# Patient Record
Sex: FEMALE | Race: WHITE | NOT HISPANIC OR LATINO | Employment: FULL TIME | URBAN - METROPOLITAN AREA
[De-identification: names, ages, dates, MRNs, and addresses within clinical notes are randomized per-mention and may not be internally consistent; named-entity substitution may affect disease eponyms.]

---

## 2022-02-28 ENCOUNTER — OFFICE VISIT (OUTPATIENT)
Dept: FAMILY MEDICINE | Facility: CLINIC | Age: 37
End: 2022-02-28
Payer: OTHER GOVERNMENT

## 2022-02-28 VITALS
SYSTOLIC BLOOD PRESSURE: 121 MMHG | BODY MASS INDEX: 30.22 KG/M2 | HEART RATE: 78 BPM | HEIGHT: 66 IN | DIASTOLIC BLOOD PRESSURE: 80 MMHG | TEMPERATURE: 98.5 F | WEIGHT: 188 LBS | OXYGEN SATURATION: 98 %

## 2022-02-28 DIAGNOSIS — F41.1 GAD (GENERALIZED ANXIETY DISORDER): Primary | ICD-10-CM

## 2022-02-28 PROCEDURE — 99203 OFFICE O/P NEW LOW 30 MIN: CPT | Performed by: PHYSICIAN ASSISTANT

## 2022-02-28 PROCEDURE — 96127 BRIEF EMOTIONAL/BEHAV ASSMT: CPT | Performed by: PHYSICIAN ASSISTANT

## 2022-02-28 RX ORDER — CITALOPRAM HYDROBROMIDE 20 MG/1
TABLET ORAL
Qty: 30 TABLET | Refills: 1 | Status: SHIPPED | OUTPATIENT
Start: 2022-02-28 | End: 2022-04-04 | Stop reason: DRUGHIGH

## 2022-02-28 ASSESSMENT — ANXIETY QUESTIONNAIRES
1. FEELING NERVOUS, ANXIOUS, OR ON EDGE: NEARLY EVERY DAY
3. WORRYING TOO MUCH ABOUT DIFFERENT THINGS: SEVERAL DAYS
2. NOT BEING ABLE TO STOP OR CONTROL WORRYING: SEVERAL DAYS
6. BECOMING EASILY ANNOYED OR IRRITABLE: NEARLY EVERY DAY
GAD7 TOTAL SCORE: 10
5. BEING SO RESTLESS THAT IT IS HARD TO SIT STILL: NOT AT ALL
IF YOU CHECKED OFF ANY PROBLEMS ON THIS QUESTIONNAIRE, HOW DIFFICULT HAVE THESE PROBLEMS MADE IT FOR YOU TO DO YOUR WORK, TAKE CARE OF THINGS AT HOME, OR GET ALONG WITH OTHER PEOPLE: SOMEWHAT DIFFICULT
7. FEELING AFRAID AS IF SOMETHING AWFUL MIGHT HAPPEN: MORE THAN HALF THE DAYS

## 2022-02-28 ASSESSMENT — PAIN SCALES - GENERAL: PAINLEVEL: NO PAIN (0)

## 2022-02-28 ASSESSMENT — PATIENT HEALTH QUESTIONNAIRE - PHQ9
SUM OF ALL RESPONSES TO PHQ QUESTIONS 1-9: 9
5. POOR APPETITE OR OVEREATING: NOT AT ALL

## 2022-02-28 NOTE — NURSING NOTE
"Chief Complaint   Patient presents with     Anxiety       Initial /80   Pulse 78   Temp 98.5  F (36.9  C) (Tympanic)   Ht 1.664 m (5' 5.5\")   Wt 85.3 kg (188 lb)   SpO2 98%   BMI 30.81 kg/m   Estimated body mass index is 30.81 kg/m  as calculated from the following:    Height as of this encounter: 1.664 m (5' 5.5\").    Weight as of this encounter: 85.3 kg (188 lb).  Medication Reconciliation: complete    JAN Darby MA    "

## 2022-02-28 NOTE — PROGRESS NOTES
"  Assessment & Plan     AMELIA (generalized anxiety disorder)  She is going to start counseling. Referral placed and she will schedule the appointment.   Also start medication, selective serotonin reuptake inhibitor medications discussed.   She will start celexa. Side effects and how to take the medication discussed.  Plan to return in 4-6 weeks for follow up, sooner if needed.   - citalopram (CELEXA) 20 MG tablet; 1/2 tablet daily x 6 days, then increase to 1 tablet daily  - Adult Mental Health  Referral; Future      30 minutes spent on the date of the encounter doing chart review, review of outside records, patient visit and documentation        BMI:   Estimated body mass index is 30.81 kg/m  as calculated from the following:    Height as of this encounter: 1.664 m (5' 5.5\").    Weight as of this encounter: 85.3 kg (188 lb).   Weight management plan:  not discussed today        Return in about 1 month (around 3/28/2022) for depression / anxiety.    Kristen M. Kehr, PA-C  [facility]   Albertina is a 36 year old who presents for the following health issues     Albertina is new to [facility]. She was previously living in [facility] and recently moved to [place]. She is here today to discuss anxiety. She feels that she has had anxiety for years. She has been able to stay busy with her schedule and will typically have good and bad days. She often has thought about making an appointment on the bad days, but then the good days seem to outweigh and she does not schedule.  She has had more stressors in her life recently. She is working on custody agreement for her 3 year old child. She has shared custody with his father and the father is still in [facility]. This is 3 hours away. She also has work stress with starting a new job. Her friend tried to commit suicide a few weeks ago and her son is currently being treated for anxiety also.   She is having difficulty with " "concentration. She has a hard time shutting her mind and worries off. She scheduled to see a counselor today, but the appointment was cancelled because she was told she needed a referral in order to keep the appointment.   She is here today to discuss referral and medication treatments.     History of Present Illness       Mental Health Follow-up:  Patient presents to follow-up on Anxiety.    Patient's anxiety since last visit has been:  Worse  The patient is not having other symptoms associated with anxiety.  Any significant life events: No  Patient is feeling anxious or having panic attacks.  Patient has no concerns about alcohol or drug use.     Social History  Tobacco Use    Smoking status: Never Smoker    Smokeless tobacco: Never Used  Alcohol use: Yes    Comment: Alcoholic Drinks/day: Rarely  Drug use: No      Today's PHQ-9         PHQ-9 Total Score:         PHQ-9 Q9 Thoughts of better off dead/self-harm past 2 weeks :       Thoughts of suicide or self harm:      Self-harm Plan:        Self-harm Action:          Safety concerns for self or others:           She eats 0-1 servings of fruits and vegetables daily.She consumes 0 sweetened beverage(s) daily.She exercises with enough effort to increase her heart rate 9 or less minutes per day.  She exercises with enough effort to increase her heart rate 3 or less days per week.   She is taking medications regularly.             Review of Systems   Constitutional, HEENT, cardiovascular, pulmonary, GI, , musculoskeletal, neuro, skin, endocrine and psych systems are negative, except as otherwise noted.      Objective    /80   Pulse 78   Temp 98.5  F (36.9  C) (Tympanic)   Ht 1.664 m (5' 5.5\")   Wt 85.3 kg (188 lb)   SpO2 98%   BMI 30.81 kg/m    Body mass index is 30.81 kg/m .  Physical Exam   GENERAL: healthy, alert and no distress  PSYCH: mentation appears normal, tearful, anxious, judgement and insight intact and appearance well groomed                "

## 2022-03-01 ENCOUNTER — TELEPHONE (OUTPATIENT)
Dept: BEHAVIORAL HEALTH | Facility: CLINIC | Age: 37
End: 2022-03-01
Payer: OTHER GOVERNMENT

## 2022-03-01 ASSESSMENT — ANXIETY QUESTIONNAIRES: GAD7 TOTAL SCORE: 10

## 2022-03-02 NOTE — TELEPHONE ENCOUNTER
First attempt to contact pt. Writer left  with TC contact info and encouraged pt to call back and schedule initital TC therapy appointment.    Jessica Lay  03/01/22  633  ----- Message from Jalil Euceda sent at 3/1/2022  4:26 PM CST -----  Regarding: transition clinic referral  Transition Clinic Referral   [place] Only   Limited [place] Availability    Type of Referral:      ___X_Therapy    _____Therapy & Medication (Psychiatry next level of care appointment needs to be scheduled)  _____Medication Only (Psychiatry next level of care appointment needs to be scheduled)  _____Diagnostic Assessment Only      Referring Provider Name: Jalil Euceda    Clinician completing the assessment.     Referring Provider: OTHER: Outpatient Intake    If known, referring provider Phone Number: [phone]    Reason for Transition Clinic Referral: Pt has a referral for long-term therapy. Please help bridge the wait until pt can be seen.    [facility]     Start Date for Next Level of Care Therapy (Required): 7/25/22  [facility]     Start Date for Next Level of Care Medication (Required): N/a  Provider  Location   Psychiatry cannot see patients who do not have active medical insurance    What Would Be Helpful from the Transition Clinic: Pt has a referral for long-term therapy. Please help bridge the wait until pt can be seen.     Needs: NO    Does Patient Have Access to Technology: yes    Patient E-mail Address: [email or web address]    Current Patient Phone Number: [phone];     Clinician Gender Preference (if applicable): NO    Jalil Euceda

## 2022-03-02 NOTE — TELEPHONE ENCOUNTER
Pt called back on que. Writer scheduled pt with TC therapy for 03/07 at 2 pm with Jaja. Pt said she wanted to wait until therapy appointment to see if she wanted to schedule different long term therapy. [facility] referral as complete.    Jessica Lay  03/01/22  650    ----- Message from Jalil Euceda sent at 3/1/2022  4:26 PM CST -----  Regarding: transition clinic referral  Transition Clinic Referral   [place] Only   Limited [place] Availability     Type of Referral:        ___X_Therapy    _____Therapy & Medication (Psychiatry next level of care appointment needs to be scheduled)  _____Medication Only (Psychiatry next level of care appointment needs to be scheduled)  _____Diagnostic Assessment Only        Referring Provider Name: Jalil Euceda     Clinician completing the assessment.      Referring Provider: OTHER: Outpatient Intake     If known, referring provider Phone Number: [phone]     Reason for Transition Clinic Referral: Pt has a referral for long-term therapy. Please help bridge the wait until pt can be seen.     Next Level of Care Patient Will Be Transitioned To: Serena Christensen LGSW  [facility]      Start Date for Next Level of Care Therapy (Required): 7/25/22  [facility]      Start Date for Next Level of Care Medication (Required): N/a  Provider  Location   Psychiatry cannot see patients who do not have active medical insurance     What Would Be Helpful from the Transition Clinic: Pt has a referral for long-term therapy. Please help bridge the wait until pt can be seen.      Needs: NO     Does Patient Have Access to Technology: yes     Patient E-mail Address: [email or web address]     Current Patient Phone Number: [phone];      Clinician Gender Preference (if applicable): NO     Jalil Euceda

## 2022-03-07 ENCOUNTER — TELEPHONE (OUTPATIENT)
Dept: BEHAVIORAL HEALTH | Facility: CLINIC | Age: 37
End: 2022-03-07
Payer: OTHER GOVERNMENT

## 2022-03-07 NOTE — TELEPHONE ENCOUNTER
Patient called to reschedule TC therapy appointment. Rescheduled to 3/11/2022.     Danika Crook  Transition Clinic Coordinator

## 2022-03-11 ENCOUNTER — VIRTUAL VISIT (OUTPATIENT)
Dept: BEHAVIORAL HEALTH | Facility: CLINIC | Age: 37
End: 2022-03-11
Payer: OTHER GOVERNMENT

## 2022-03-11 DIAGNOSIS — F41.1 GAD (GENERALIZED ANXIETY DISORDER): Primary | ICD-10-CM

## 2022-03-11 DIAGNOSIS — F43.23 ADJUSTMENT DISORDER WITH MIXED ANXIETY AND DEPRESSED MOOD: ICD-10-CM

## 2022-03-16 ENCOUNTER — VIRTUAL VISIT (OUTPATIENT)
Dept: BEHAVIORAL HEALTH | Facility: CLINIC | Age: 37
End: 2022-03-16
Payer: OTHER GOVERNMENT

## 2022-03-16 DIAGNOSIS — F43.23 ADJUSTMENT DISORDER WITH MIXED ANXIETY AND DEPRESSED MOOD: ICD-10-CM

## 2022-03-16 DIAGNOSIS — F41.1 GAD (GENERALIZED ANXIETY DISORDER): Primary | ICD-10-CM

## 2022-03-16 NOTE — PROGRESS NOTES
[facility]   Mental Health & Addiction Services     Progress Note - Initial Visit    Patient  Name:  Albertina Rojas  Date: 3/11/2022         Service Type: Individual     Visit Start Time: 1400  Visit End Time: 1508    Visit #: 1    Attendees: Client attended alone    Service Modality:  Video Visit:      Provider verified identity through the following two step process.  Patient provided:  Patient  and Patient address    Telemedicine Visit: The patient's condition can be safely assessed and treated via synchronous audio and visual telemedicine encounter.      Reason for Telemedicine Visit: Services only offered telehealth    Originating Site (Patient Location): Patient's other pt was in her car during session    Distant Site (Provider Location): [facility]DI[facility]    Consent:  The patient/guardian has verbally consented to: the potential risks and benefits of telemedicine (video visit) versus in person care; bill my insurance or make self-payment for services provided; and responsibility for payment of non-covered services.     Patient would like the video invitation sent by:  My Chart    Mode of Communication:  Video Conference via Amwell    As the provider I attest to compliance with applicable laws and regulations related to telemedicine.       DATA:   Interactive Complexity: No   Crisis: No     Presenting Concerns/  Current Stressors:   Pt referred to the Transition Clinic by her primary care physician at [facility]. Per chart review, Albertina is new to [facility]. She was previously living in [facility] and recently moved to [place]. She is here today to discuss anxiety. She feels that she has had anxiety for years. She has been able to stay busy with her schedule and will typically have good and bad days. She often has thought about making an appointment on the bad days, but then the good days seem to outweigh and she  "does not schedule.  She has had more stressors in her life recently. She is working on custody agreement for her 3 year old child. She has shared custody with his father and the father is still in [facility]. This is 3 hours away. She also has work stress with starting a new job. Her friend tried to commit suicide a few weeks ago and her son is currently being treated for anxiety also.   She is having difficulty with concentration. She has a hard time shutting her mind and worries off. She scheduled to see a counselor today, but the appointment was cancelled because she was told she needed a referral in order to keep the appointment. She is here today to discuss referral and medication treatments\"    Patient was referred to the [facility] for long-term therapy but wait time is June. Patient expressed interest in this provider assisting her in finding a long-term therapist outside of the [place] system that accepts [facility]. Patient was emotional during the session. The patient reports her main stressor involves the ongoing custody issues with her youngest sons father. The patient reports that she has two children, both boys, ages 13 and 3. The patient states that when she has been  x2 and states that during her second marriage, she was pregnant with her son that is now 3 when her ex- announced that he wanted a divorce. The patient reports that she has always felt a level of anxiety but has been fairly successful in managing these symptoms. Symptoms started increasing in the past two months when her ex- began challenging their custody agreement. The patient states that currently, \"the smallest things will make me cry and then I get angry that I can't control my emotions.\" The patient is in [employer] and is actively serving. Pt reports high stress at work. Recently, the patients direct supervisor was discovered in his car, outside of his job, with a gun, intent on shooting himself. The patient " reports this was highly traumatic for her and she still feels a lot of anger, sadness, and resentment about it.     Pt expressed comprehension and acceptance of informed consent and the nature of / limitations to confidentiality due to mandated reporting when reviewed in session.       ASSESSMENT:  Mental Status Assessment:  Appearance:   Appropriate   Eye Contact:   Fair   Psychomotor Behavior: Restless   Attitude:   Cooperative   Orientation:   All  Speech   Rate / Production: Emotional   Volume:  Normal   Mood:    Anxious  Depressed  Sad  Panicked  Affect:    Appropriate  Tearful Worrisome   Thought Content:  Clear   Thought Form:  Coherent  Logical   Insight:    Good       Safety Issues and Plan for Safety and Risk Management:   Logan Suicide Severity Rating Scale (Lifetime/Recent)No flowsheet data found.  Patient denies current fears or concerns for personal safety.  Patient denies current or recent suicidal ideation or behaviors.  Patient denies current or recent homicidal ideation or behaviors.  Patient denies current or recent self injurious behavior or ideation.  Patient denies other safety concerns.  Recommended that patient call 911 or go to the local ED should there be a change in any of these risk factors.  Patient reports there are firearms in the house. The firearms are secured in a locked space.     Diagnostic Criteria:  Generalized Anxiety Disorder  B. The person finds it difficult to control the worry.   - Restlessness or feeling keyed up or on edge.    - Being easily fatigued.    - Difficulty concentrating or mind going blank.    - Irritability.    - Muscle tension.    - Sleep disturbance (difficulty falling or staying asleep, or restless unsatisfying sleep).   E. The anxiety, worry, or physical symptoms cause clinically significant distress or impairment in social, occupational, or other important areas of functioning.   F. The disturbance is not due to the direct physiological effects of a  substance (e.g., a drug of abuse, a medication) or a general medical condition (e.g., hyperthyroidism) and does not occur exclusively during a Mood Disorder, a Psychotic Disorder, or a Pervasive Developmental Disorder.  Adjustment Disorder  A. The development of emotional or behavioral symptoms in response to an identifiable stressor(s) occurring within 3 months of the onset of the stressor(s)  B. These symptoms or behaviors are clinically significant, as evidenced by one or both of the following:       - Marked distress that is out of proportion to the severity/intensity of the stressor (with consideration for external context & culture)  C. The stress-related disturbance does not meet criteria for another disorder & is not not an exacerbation of another mental disorder  D. The symptoms do not represent normal bereavement  E. Once the stressor or its consequences have terminated, the symptoms do not persist for more than an additional 6 months       * Adjustment Disorder with Mixed Anxiety and Depressed Mood: The predominant manifestation is a combination of depression and anxiety      DSM5 Diagnoses: (Sustained by DSM5 Criteria Listed Above)  Diagnoses: 300.02 (F41.1) Generalized Anxiety Disorder  Adjustment Disorders  309.28 (F43.23) With mixed anxiety and depressed mood  Psychosocial & Contextual Factors: conflict with ex- over custody of child; active ; financial stressors  WHODAS 2.0 (12 item):   WHODAS 2.0 Total Score 2/24/2022   Total Score 20   Total Score MyChart 20     Intervention:   CBT- Patient was given cognitive distortions list to review and process at next session, Mindfulness- Patient was educated on relaxation and mindfulness techniques, Completed through review of safety issues and safety interventions and Educated on treatment planning and started identifying goals and interventions for treatment plan  Collateral Reports Completed:  Not Applicable      PLAN: (Homework,  other):  1. Provider will continue Diagnostic Assessment.  Patient was given the following to do until next session:  Review CBT handouts; practice DBT techniques    2. Provider recommended the following referrals: pt needs assistance in finding a long-term provider that accepts [facility].      3.  Suicide Risk and Safety Concerns were assessed for Albertina Rojas.    Patient meets the following risk assessment and triage: Patient has no change in safety concerns. Committed to safety and agreed to follow previously developed safety plan.      Dara Mendes, [facility]  March 11, 2022

## 2022-03-17 NOTE — PROGRESS NOTES
[facility] Transition Clinic                                    Progress Note    Patient Name: Albertina Rojas  Date: 3/16/2022         Service Type: Individual      Session Start Time: 1400  Session End Time: 1502      Session Length: 62 Minutes    Session #: 2    Attendees: Client attended alone    Service Modality:  Video Visit:      Provider verified identity through the following two step process.  Patient provided:  Patient is known previously to provider    Telemedicine Visit: The patient's condition can be safely assessed and treated via synchronous audio and visual telemedicine encounter.      Reason for Telemedicine Visit: Services only offered telehealth    Originating Site (Patient Location): Patient's other pt attended session in her car    Distant Site (Provider Location): [facility] MENTAL HEALTH & ADDICTION SERVICES    Consent:  The patient/guardian has verbally consented to: the potential risks and benefits of telemedicine (video visit) versus in person care; bill my insurance or make self-payment for services provided; and responsibility for payment of non-covered services.     Patient would like the video invitation sent by:  My Chart    Mode of Communication:  Video Conference via Amwell    As the provider I attest to compliance with applicable laws and regulations related to telemedicine.    DATA  Interactive Complexity: No  Crisis: No        Progress Since Last Session (Related to Symptoms / Goals / Homework):   Symptoms: Improving pt reports improvement in overall mood since previous session.Pt states she continues to have difficulties with sleep and has felt more fatigued     Homework: Partially completed      Episode of Care Goals: Minimal progress - CONTEMPLATION (Considering change and yet undecided); Intervened by assessing the negative and positive thinking (ambivalence) about behavior change     Current / Ongoing Stressors and Concerns:    Pt referred to  "the Transition Clinic by her primary care physician at [facility]. Per chart review, Albertina is new to [facility]. She was previously living in [facility] and recently moved to [place]. She is here today to discuss anxiety. She feels that she has had anxiety for years. She has been able to stay busy with her schedule and will typically have good and bad days. She often has thought about making an appointment on the bad days, but then the good days seem to outweigh and she does not schedule.  She has had more stressors in her life recently. She is working on custody agreement for her 3 year old child. She has shared custody with his father and the father is still in [place]. This is 3 hours away. She also has work stress with starting a new job. Her friend tried to commit suicide a few weeks ago and her son is currently being treated for anxiety also.   She is having difficulty with concentration. She has a hard time shutting her mind and worries off. She scheduled to see a counselor today, but the appointment was cancelled because she was told she needed a referral in order to keep the appointment. She is here today to discuss referral and medication treatments\"     Patient was referred to the [facility] for long-term therapy but wait time is June. Patient expressed interest in this provider assisting her in finding a long-term therapist outside of the [place] system that accepts [facility].     Today, 3/16/2022, the patient reports she continues to feel overwhelmed with work, stress of her ongoing custody/parenting agreement, and trying to maintain work/life balance. Pt states she gave herself permission to \"sit on the couch and do nothing\" and reports feeling more motivated the following day to cook, clean, and mood was markedly improved. Pt states the custody issues with her ex- continue to be drawn out unnecessarily and she is beginning to worry about finances.     Pt expressed " comprehension and acceptance of informed consent and the nature of / limitations to confidentiality due to mandated reporting when reviewed in session.     Treatment Objective(s) Addressed in This Session:   participate in  activities to improve mood  use distraction each time intrusive worry surfaces  Use thought stopping strategies when anxious symptoms begin to increase     Intervention:    CBT- Patient was given cognitive distortions list to review and process at next session, Mindfulness- Patient was educated on relaxation and mindfulness techniques, Completed through review of safety issues and safety interventions and Educated on treatment planning and started identifying goals and interventions for treatment plan    Assessments completed prior to visit:  The following assessments were completed by patient for this visit:  PHQ9:   PHQ-9 SCORE 2/28/2022   PHQ-9 Total Score 9     GAD7:   AMELIA-7 SCORE 2/24/2022 2/28/2022   Total Score 17 (severe anxiety) -   Total Score 17 10         ASSESSMENT: Current Emotional / Mental Status (status of significant symptoms):   Risk status (Self / Other harm or suicidal ideation)   Patient denies current fears or concerns for personal safety.   Patient denies current or recent suicidal ideation or behaviors.   Patient denies current or recent homicidal ideation or behaviors.   Patient denies current or recent self injurious behavior or ideation.   Patient denies other safety concerns.   Patient reports there has been no change in risk factors since their last session.     Patient reports there has been no change in protective factors since their last session.     Recommended that patient call 911 or go to the local ED should there be a change in any of these risk factors.     Appearance:   Appropriate    Eye Contact:   Good    Psychomotor Behavior: Normal    Attitude:   Cooperative    Orientation:   All   Speech    Rate / Production: Normal     Volume:  Normal  "   Mood:    Anxious    Affect:    Tearful   Thought Content:  Clear    Thought Form:  Coherent  Logical    Insight:    Good      Medication Review:   No changes to current psychiatric medication(s)     Medication Compliance:   Yes     Changes in Health Issues:   None reported     Chemical Use Review:   Substance Use: Chemical use reviewed, no active concerns identified      Tobacco Use: No current tobacco use.      Diagnosis:  1. AMELIA (generalized anxiety disorder) F41.1   2. Adjustment disorder with mixed anxiety and depressed mood F43.23       Collateral Reports Completed:   Not Applicable    PLAN: (Patient Tasks / Therapist Tasks / Other)  Pt will watch the documentary \"The Call to Courage\"  Pt will continue to practice CBT and DBT skills  Pt will prioritize herself and will practice self-care        Dara Mendes, [facility], March 16, 2022                                                         ______________________________________________________________________      "

## 2022-03-23 ENCOUNTER — VIRTUAL VISIT (OUTPATIENT)
Dept: BEHAVIORAL HEALTH | Facility: CLINIC | Age: 37
End: 2022-03-23
Payer: OTHER GOVERNMENT

## 2022-03-23 DIAGNOSIS — F41.1 GAD (GENERALIZED ANXIETY DISORDER): Primary | ICD-10-CM

## 2022-03-23 DIAGNOSIS — F43.23 ADJUSTMENT DISORDER WITH MIXED ANXIETY AND DEPRESSED MOOD: ICD-10-CM

## 2022-03-23 NOTE — PROGRESS NOTES
"    [facility]  Provider Name:  Dara Mendes     Credentials:  [facility]    PATIENT'S NAME: Albertina Rojas  PREFERRED NAME: Albertina  PRONOUNS:    She/Her  MRN: [id number]  : 1985  ADDRESS: [address] [place] [id number]  ACCT. NUMBER:  [id number]  DATE OF SERVICE: 3/23/22  START TIME:  1400  END TIME: 1440  PREFERRED PHONE: [phone]  May we leave a program related message: Yes  SERVICE MODALITY:  Video Visit:      Provider verified identity through the following two step process.  Patient provided:  Patient is known previously to provider    Telemedicine Visit: The patient's condition can be safely assessed and treated via synchronous audio and visual telemedicine encounter.      Reason for Telemedicine Visit: Services only offered telehealth    Originating Site (Patient Location): Patient's place of employment    Distant Site (Provider Location): [facility] MENTAL HEALTH & ADDICTION SERVICES    Consent:  The patient/guardian has verbally consented to: the potential risks and benefits of telemedicine (video visit) versus in person care; bill my insurance or make self-payment for services provided; and responsibility for payment of non-covered services.     Patient would like the video invitation sent by:  My Chart    Mode of Communication:  Video Conference via [employer]    As the provider I attest to compliance with applicable laws and regulations related to telemedicine.    [place] ADULT Mental Health DIAGNOSTIC ASSESSMENT    Identifying Information:  Patient is a 36 year old,  female.The pronoun use throughout this assessment reflects the patient's chosen pronoun. Patient was referred for an assessment by selfself.  Patient attended the session alone.    Chief Complaint:   The reason for seeking services at this time is: \"Possible anxiety\". Per chart review, Albertina is new to [facility]. She " "was previously living in [facility] and recently moved to [place]. She is here today to discuss anxiety. She feels that she has had anxiety for years. She has been able to stay busy with her schedule and will typically have good and bad days. She often has thought about making an appointment on the bad days, but then the good days seem to outweigh and she does not schedule.She has had more stressors in her life recently. She is working on custody agreement for her 3 year old child. She has shared custody with his father and the father is still in [place]. This is 3 hours away. She also has work stress with starting a new job. Her friend tried to commit suicide a few weeks ago and her son is currently being treated for anxiety also. She is having difficulty with concentration. She has a hard time shutting her mind and worries off. She scheduled to see a counselor today, but the appointment was cancelled because she was told she needed a referral in order to keep the appointment. She is here today to discuss referral and medication treatments\"The problem(s) began 10/01/17.    Patient has attempted to resolve these concerns in the past through therapy and medication management.    Social/Family History:  Patient reported they grew up in [place], [place].  They were raised by biological parents  .  Parents one or both remarried.  Patient reported that their childhood was \"good\". Parents were  when patient was in the 6th grade. Went back and forth between parents home. Has one older brother. Patient described their current relationships with family of origin as \"close with mom and dad but I don't speak with my brother much\".     The patient describes their cultural background as . Cultural influences and impact on patient's life structure, values, norms, and healthcare: N/a.  Contextual influences on patient's health include: Individual Factors including ongoing work stressors, Family Factors " including ongoing custody issue with the father of her youngest son and Economic Factors including ongoing legal fees for custody case. These factors will be addressed in the Preliminary Treatment plan. Patient identified their preferred language to be English. Patient reported they does not need the assistance of an  or other support involved in therapy.     Patient reported had no significant delays in developmental tasks. Patient's highest education level was associate degree / vocational certificate  Patient identified the following learning problems: none reported.  Modifications will not be used to assist communication in therapy.  Patient reports they are able to understand written materials.    Patient reported the following relationship history: patient has been twice and both marriages ended in divorce.  Patient's current relationship status is has a partner or significant other.   Patient identified their sexual orientation as heterosexual. Patient reported having 2 child(buffy). Patient identified partner; friends as part of their support system. Patient identified the quality of these relationships as stable and meaningful.     Patient's current living/housing situation involves staying in own home/apartment.  The immediate members of family and household include Owen Rizzo, 3 y.o. son, 13 y.o. son and the patients partner and they report that housing is stable.    Patient is currently employed fulltime.  Patient is currently active in the [employer] and is employed full time by the [employer]. Pt reports her job is high stress. Patient reports their finances are obtained through employment. Patient does not identify finances as a current stressor.      Patient reported that they have been involved with the legal system.  Currently working with a  to adjust parenting agreement with Owen (3yrs) . Patient does not report being under probation/ parole/ jurisdiction. They  are not under any current court jurisdiction. .     Patient's Strengths and Limitations:  Patient identified the following strengths or resources that will help them succeed in treatment: commitment to health and well being, friends / good social support, family support, insight, intelligence, positive work environment, motivation and work ethic.Things that may interfere with the patient's success in treatment include: lack of support from her sons father; ongoing work stressors.     Assessments:  The following assessments were completed by patient for this visit:  PHQ9:   PHQ-9 SCORE 2/28/2022   PHQ-9 Total Score 9     GAD7:   AMELIA-7 SCORE 2/24/2022 2/28/2022   Total Score 17 (severe anxiety) -   Total Score 17 10     CAGE-AID:   CAGE-AID Total Score 2/24/2022   Total Score 0   Total Score MyChart 0 (A total score of 2 or greater is considered clinically significant)     PROMIS 10-Global Health (all questions and answers displayed):   PROMIS 10 3/11/2022   In general, would you say your health is: Good   In general, would you say your quality of life is: Good   In general, how would you rate your physical health? Good   In general, how would you rate your mental health, including your mood and your ability to think? Fair   In general, how would you rate your satisfaction with your social activities and relationships? Fair   In general, please rate how well you carry out your usual social activities and roles Fair   To what extent are you able to carry out your everyday physical activities such as walking, climbing stairs, carrying groceries, or moving a chair? Completely   How often have you been bothered by emotional problems such as feeling anxious, depressed or irritable? Often   How would you rate your fatigue on average? Severe   How would you rate your pain on average?   0 = No Pain  to  10 = Worst Imaginable Pain 0   In general, would you say your health is: 3   In general, would you say your quality of life  is: 3   In general, how would you rate your physical health? 3   In general, how would you rate your mental health, including your mood and your ability to think? 2   In general, how would you rate your satisfaction with your social activities and relationships? 2   In general, please rate how well you carry out your usual social activities and roles. (This includes activities at home, at work and in your community, and responsibilities as a parent, child, spouse, employee, friend, etc.) 2   To what extent are you able to carry out your everyday physical activities such as walking, climbing stairs, carrying groceries, or moving a chair? 5   In the past 7 days, how often have you been bothered by emotional problems such as feeling anxious, depressed, or irritable? 4   In the past 7 days, how would you rate your fatigue on average? 4   In the past 7 days, how would you rate your pain on average, where 0 means no pain, and 10 means worst imaginable pain? 0   Global Mental Health Score 9   Global Physical Health Score 15   PROMIS TOTAL - SUBSCORES 24   Some recent data might be hidden       Personal and Family Medical History:  Patient does not report a family history of mental health concerns.  Patient reports family history includes Anxiety Disorder in her son; Cancer (age of onset: 50.00) in her mother; Hypertension in her father; No Known Problems in her brother..     Patient does report Mental Health Diagnosis and/or Treatment.  Patient Patient reported the following previous diagnoses which include(s): an Anxiety Disorder. Patient reported symptoms began in October 2017.  Patient has received mental health services in the past: therapy with unknown provider and medication management by her PCP.  Psychiatric Hospitalizations: None.  Patient denies a history of civil commitment. Patient is receiving other mental health services.These include psychotherapy with [facility]. .         Patient  has had a physical exam to rule out medical causes for current symptoms.  Date of last physical exam was within the past year. Client was encouraged to follow up with PCP if symptoms were to develop. The patient has a [facility] Primary Care Provider, who is named Kehr, Kristen M..  Patient reports no current medical and/or dental concerns. Patient denies any issues with pain. There are not significant appetite / nutritional concerns / weight changes.   Patient does not report a history of head injury / trauma / cognitive impairment.     Patient reports current meds as:   Celexa 20mg tablet daily       Medication Adherence:  Patient reports   taking prescribed medications as prescribed.    Patient Allergies:  No Known Allergies    Medical History:  No past medical history on file.      Current Mental Status Exam:   Appearance:  Appropriate    Eye Contact:  Good   Psychomotor:  Normal       Gait / station:  no problem  Attitude / Demeanor: Cooperative  Friendly Pleasant  Speech      Rate / Production: Normal/ Responsive      Volume:  Normal  volume      Language:  intact  Mood:   Normal Bright; good mood  Affect:   Appropriate  Bright    Thought Content: Clear   Thought Process: Coherent  Logical       Associations: No loosening of associations  Insight:   Good   Judgment:  Intact   Orientation:  All  Attention/concentration: Good      Substance Use:  Patient did not report a family history of substance use concerns; see medical history section for details.  Patient has not received chemical dependency treatment in the past.  Patient has not ever been to detox.      Patient is not currently receiving any chemical dependency treatment.           Substance History of use Age of first use Date of last use     Pattern and duration of use (include amounts and frequency)   Alcohol currently use   23, drink socially 02/19/22 REPORTS SUBSTANCE USE: N/A   Cannabis   never used     REPORTS SUBSTANCE USE: N/A     Amphetamines    never used     REPORTS SUBSTANCE USE: N/A   Cocaine/crack    never used       REPORTS SUBSTANCE USE: N/A   Hallucinogens never used         REPORTS SUBSTANCE USE: N/A   Inhalants never used         REPORTS SUBSTANCE USE: N/A   Heroin never used         REPORTS SUBSTANCE USE: N/A   Other Opiates never used     REPORTS SUBSTANCE USE: N/A   Benzodiazepine   never used     REPORTS SUBSTANCE USE: N/A   Barbiturates never used     REPORTS SUBSTANCE USE: N/A   Over the counter meds currently use 20+, use went sick 01/15/22 REPORTS SUBSTANCE USE: N/A   Caffeine currently use 17, soda/coffee   REPORTS SUBSTANCE USE: N/A   Nicotine  never used     REPORTS SUBSTANCE USE: N/A   Other substances not listed above:  Identify:  never used     REPORTS SUBSTANCE USE: N/A     Patient reported the following problems as a result of their substance use: no problems, not applicable.    Substance Use: No symptoms    Based on the negative CAGE score and clinical interview there  are not indications of drug or alcohol abuse.      Significant Losses / Trauma / Abuse / Neglect Issues:   Patient did serve in the . 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  Concerns for possible neglect are not present.    Safety Assessment:   Patient denies current homicidal ideation and behaviors.  Patient denies current self-injurious ideation and behaviors.    Patient denied risk behaviors associated with substance use.  Patient denies any high risk behaviors associated with mental health symptoms.  Patient reports the following current concerns for their personal safety: None.  Patient reports there are firearms in the house. The firearms are secured in  a locked space.    History of Safety Concerns:  Patient denied a history of homicidal ideation.     Patient denied a history of personal safety concerns.    Patient denied a history of assaultive behaviors.    Patient denied a history of sexual assault behaviors.     Patient denied a history of risk behaviors associated with substance use.  Patient denies any history of high risk behaviors associated with mental health symptoms.  Patient reports the following protective factors: forward or future oriented thinking; dedication to family or friends    Risk Plan:  See Recommendations for Safety and Risk Management Plan    Review of Symptoms per patient report:  Depression: Change in sleep, Excessive or inappropriate guilt, Change in energy level, Difficulties concentrating, Change in appetite, Feelings of hopelessness, Feelings of helplessness, Low self-worth, Irritability, Feeling sad, down, or depressed, Withdrawn and Frequent crying  Caitlin:  No Symptoms  Psychosis: No Symptoms  Anxiety: Excessive worry, Nervousness, Physical complaints, such as headaches, stomachaches, muscle tension, Sleep disturbance, Poor concentration and Irritability  Panic:  Palpitations  Post Traumatic Stress Disorder:  No Symptoms   Eating Disorder: No Symptoms  ADD / ADHD:  No symptoms  Conduct Disorder: No symptoms  Autism Spectrum Disorder: No symptoms  Obsessive Compulsive Disorder: No Symptoms    Patient reports the following compulsive behaviors and treatment history: None reported.      Diagnostic Criteria:   Adjustment Disorder  A. The development of emotional or behavioral symptoms in response to an identifiable stressor(s) occurring within 3 months of the onset of the stressor(s)  B. These symptoms or behaviors are clinically significant, as evidenced by one or both of the following:  C. The stress-related disturbance does not meet criteria for another disorder & is not not an exacerbation of another mental disorder  D. The  symptoms do not represent normal bereavement  E. Once the stressor or its consequences have terminated, the symptoms do not persist for more than an additional 6 months       * Adjustment Disorder with Mixed Anxiety and Depressed Mood: The predominant manifestation is a combination of depression and anxiety    Functional Status:  Patient reports the following functional impairments:  childcare / parenting, home life with her two children and her current partner, management of the household and or completion of tasks, organization, relationship(s), self-care, social interactions and work / vocational responsibilities.     Nonprogrammatic care:  Patient is requesting basic services to address current mental health concerns.    Clinical Summary:  1. Reason for assessment: seeking individual long-term therapy   2. Psychosocial, Cultural and Contextual Factors: custody concerns with her youngest son; active in the ; hx of divorce.  3. Principal DSM5 Diagnoses  (Sustained by DSM5 Criteria Listed Above):  Adjustment Disorders  309.28 (F43.23) With mixed anxiety and depressed mood.  4. Other Diagnoses that is relevant to services:   300.02 (F41.1) Generalized Anxiety Disorder.  5. Provisional Diagnosis:  Adjustment Disorders  309.28 (F43.23) With mixed anxiety and depressed mood as evidenced by ongoing anxiety and depressed mood due to recent life circumstances including custody issues; ongoing work stress and relational conflict .  6. Prognosis: Relieve Acute Symptoms.  7. Likely consequences of symptoms if not treated: higher level of care.  8. Client strengths include:  caring, educated, empathetic, employed, goal-focused, good listener, has a previous history of therapy, insightful, intelligent, motivated, open to learning, open to suggestions / feedback, responsible parent, support of family, friends and providers, supportive, wants to learn, willing to ask questions, willing to relate to others and work  history .     Recommendations:     1. Plan for Safety and Risk Management:   Recommended that patient call 911 or go to the local ED should there be a change in any of these risk factors. Report to child / adult protection services was NA.     2. Patient's identified none reported.     3. Initial Treatment will focus on:    Anxiety - excessive worry; sleep disturbance; crying excessively irritability  Adjustment Difficulties related to: family concerns and custody concerns; work stressors.     4. Resources/Service Plan:    services are not indicated.   Modifications to assist communication are not indicated.   Additional disability accommodations are not indicated.      5. Collaboration:   Collaboration / coordination of treatment will be initiated with the following  support professionals: none at this time. The provider will assist the patient with findiing a long-term provider that accepts [facility].      6.  Referrals:   The following referral(s) will be initiated: [facility]. Next Scheduled Appointment: no appointment at this time.     A Release of Information has been obtained for the following: none at this time.    7. YAZ:    YAZ:  Discussed the general effects of drugs and alcohol on health and well-being. Provider gave patient printed information about the effects of chemical use on their health and well being. Recommendations: None at this time.    8. Records:   These were reviewed at time of assessment.   Information in this assessment was obtained from the medical record and  provided by patient who is a good historian.  Patient will have open access to their mental health medical record.        Provider Name/ Credentials:  OLAMIDE Ervin  March 23, 2022

## 2022-04-02 ENCOUNTER — HEALTH MAINTENANCE LETTER (OUTPATIENT)
Age: 37
End: 2022-04-02

## 2022-04-03 DIAGNOSIS — F41.1 GAD (GENERALIZED ANXIETY DISORDER): ICD-10-CM

## 2022-04-04 ENCOUNTER — OFFICE VISIT (OUTPATIENT)
Dept: FAMILY MEDICINE | Facility: CLINIC | Age: 37
End: 2022-04-04
Payer: OTHER GOVERNMENT

## 2022-04-04 VITALS
HEART RATE: 79 BPM | OXYGEN SATURATION: 98 % | DIASTOLIC BLOOD PRESSURE: 80 MMHG | WEIGHT: 187 LBS | TEMPERATURE: 97.7 F | SYSTOLIC BLOOD PRESSURE: 121 MMHG | BODY MASS INDEX: 30.65 KG/M2

## 2022-04-04 DIAGNOSIS — F41.1 GAD (GENERALIZED ANXIETY DISORDER): Primary | ICD-10-CM

## 2022-04-04 PROCEDURE — 99213 OFFICE O/P EST LOW 20 MIN: CPT | Performed by: PHYSICIAN ASSISTANT

## 2022-04-04 RX ORDER — CITALOPRAM HYDROBROMIDE 20 MG/1
TABLET ORAL
Qty: 30 TABLET | Refills: 1 | OUTPATIENT
Start: 2022-04-04

## 2022-04-04 RX ORDER — CITALOPRAM HYDROBROMIDE 20 MG/1
20 TABLET ORAL DAILY
Qty: 90 TABLET | Refills: 3 | Status: SHIPPED | OUTPATIENT
Start: 2022-04-04 | End: 2023-03-31

## 2022-04-04 ASSESSMENT — ANXIETY QUESTIONNAIRES
GAD7 TOTAL SCORE: 8
2. NOT BEING ABLE TO STOP OR CONTROL WORRYING: SEVERAL DAYS
7. FEELING AFRAID AS IF SOMETHING AWFUL MIGHT HAPPEN: SEVERAL DAYS
4. TROUBLE RELAXING: MORE THAN HALF THE DAYS
1. FEELING NERVOUS, ANXIOUS, OR ON EDGE: SEVERAL DAYS
3. WORRYING TOO MUCH ABOUT DIFFERENT THINGS: SEVERAL DAYS
GAD7 TOTAL SCORE: 8
7. FEELING AFRAID AS IF SOMETHING AWFUL MIGHT HAPPEN: SEVERAL DAYS
5. BEING SO RESTLESS THAT IT IS HARD TO SIT STILL: NOT AT ALL
GAD7 TOTAL SCORE: 8
6. BECOMING EASILY ANNOYED OR IRRITABLE: MORE THAN HALF THE DAYS

## 2022-04-04 ASSESSMENT — PAIN SCALES - GENERAL: PAINLEVEL: NO PAIN (0)

## 2022-04-04 ASSESSMENT — PATIENT HEALTH QUESTIONNAIRE - PHQ9: SUM OF ALL RESPONSES TO PHQ QUESTIONS 1-9: 0

## 2022-04-04 NOTE — NURSING NOTE
"Chief Complaint   Patient presents with     Depression     Anxiety       Initial /80   Pulse 79   Temp 97.7  F (36.5  C) (Tympanic)   Wt 84.8 kg (187 lb)   SpO2 98%   BMI 30.65 kg/m   Estimated body mass index is 30.65 kg/m  as calculated from the following:    Height as of 2/28/22: 1.664 m (5' 5.5\").    Weight as of this encounter: 84.8 kg (187 lb).  Medication Reconciliation: complete    JAN Darby MA    "

## 2022-04-04 NOTE — PROGRESS NOTES
Assessment & Plan     AMELIA (generalized anxiety disorder)  Doing well on the citalopram.   Continue with the 20 mg dose.   She will contact the clinic if any concerns.   She will return for routine visit and medication check in 1 year.   - citalopram (CELEXA) 20 MG tablet; Take 1 tablet (20 mg) by mouth daily    Review of prior external note(s) from - CareEverywhere [facility] from [facility] reviewed  15 minutes spent on the date of the encounter doing chart review, review of outside records, patient visit and documentation            Return in about 1 year (around 4/4/2023) for medication check.    Kristen M. Kehr, PA-C  [facility]    Patria Eng is a 36 year old who presents for the following health issues     She started the citalopram and is now on 20 mg daily.   She feels that her anxiety is better.     History of Present Illness       Mental Health Follow-up:  Patient presents to follow-up on Anxiety.    Patient's anxiety since last visit has been:  Better  The patient is having other symptoms associated with anxiety.  Any significant life events: No  Patient is not feeling anxious or having panic attacks.  Patient has no concerns about alcohol or drug use.         Today's AMELIA-7 Score: 8              Review of Systems   Constitutional, HEENT, cardiovascular, pulmonary, GI, , musculoskeletal, neuro, skin, endocrine and psych systems are negative, except as otherwise noted.      Objective    /80   Pulse 79   Temp 97.7  F (36.5  C) (Tympanic)   Wt 84.8 kg (187 lb)   SpO2 98%   BMI 30.65 kg/m    Body mass index is 30.65 kg/m .  Physical Exam   GENERAL: healthy, alert and no distress  PSYCH: mentation appears normal, affect normal/bright, judgement and insight intact and appearance well groomed

## 2022-04-05 ASSESSMENT — ANXIETY QUESTIONNAIRES: GAD7 TOTAL SCORE: 8

## 2022-04-06 ENCOUNTER — TELEPHONE (OUTPATIENT)
Dept: BEHAVIORAL HEALTH | Facility: CLINIC | Age: 37
End: 2022-04-06
Payer: OTHER GOVERNMENT

## 2022-04-06 NOTE — TELEPHONE ENCOUNTER
Writer gave pt a call to reschedule todays appointment 04/06/22 as provider called in sick. Pt kept appointment for next week.    Jessica Lay  04/06/22  [id number]

## 2022-04-13 ENCOUNTER — VIRTUAL VISIT (OUTPATIENT)
Dept: BEHAVIORAL HEALTH | Facility: CLINIC | Age: 37
End: 2022-04-13
Payer: OTHER GOVERNMENT

## 2022-04-13 DIAGNOSIS — F41.1 GAD (GENERALIZED ANXIETY DISORDER): Primary | ICD-10-CM

## 2022-04-13 DIAGNOSIS — F43.23 ADJUSTMENT DISORDER WITH MIXED ANXIETY AND DEPRESSED MOOD: ICD-10-CM

## 2022-04-13 NOTE — PROGRESS NOTES
[facility]                                    Progress Note    Patient Name: Albertina Rojas  Date: 4/13/2022         Service Type: Individual      Session Start Time: 1400  Session End Time: 1510     Session Length: 80 Minutes    Session #: 4    Attendees: Client attended alone    Service Modality:  Video Visit:      Provider verified identity through the following two step process.  Patient provided:  Patient is known previously to provider    Telemedicine Visit: The patient's condition can be safely assessed and treated via synchronous audio and visual telemedicine encounter.      Reason for Telemedicine Visit: Services only offered telehealth    Originating Site (Patient Location): Patient's home    Distant Site (Provider Location): [facility] MENTAL HEALTH & ADDI[facility]    Consent:  The patient/guardian has verbally consented to: the potential risks and benefits of telemedicine (video visit) versus in person care; bill my insurance or make self-payment for services provided; and responsibility for payment of non-covered services.     Patient would like the video invitation sent by:  My Chart    Mode of Communication:  Video Conference via Amwell    As the provider I attest to compliance with applicable laws and regulations related to telemedicine.    DATA  Interactive Complexity: No  Crisis: No        Progress Since Last Session (Related to Symptoms / Goals / Homework):   Symptoms: No change no signficant change in symptoms. Baseline anxiety of 3 on a scale to 5 with 5 being the worst. Continues to struggle with communication with partner    Homework: Achieved / completed to satisfaction      Episode of Care Goals: Minimal progress - PREPARATION (Decided to change - considering how); Intervened by negotiating a change plan and determining options / strategies for behavior change, identifying triggers, exploring social supports, and working towards setting  "a date to begin behavior change     Current / Ongoing Stressors and Concerns:   Pt referred to the Transition Clinic by her primary care physician at [facility]. Per chart review, Albertina is new to [facility]. She was previously living in [facility] and recently moved to [place]. She is here today to discuss anxiety. She feels that she has had anxiety for years. She has been able to stay busy with her schedule and will typically have good and bad days. She often has thought about making an appointment on the bad days, but then the good days seem to outweigh and she does not schedule.  She has had more stressors in her life recently. She is working on custody agreement for her 3 year old child. She has shared custody with his father and the father is still in [facility]. This is 3 hours away. She also has work stress with starting a new job. Her friend tried to commit suicide a few weeks ago and her son is currently being treated for anxiety also.   She is having difficulty with concentration. She has a hard time shutting her mind and worries off. She scheduled to see a counselor today, but the appointment was cancelled because she was told she needed a referral in order to keep the appointment. She is here today to discuss referral and medication treatments\"     Patient was referred to the [facility] for long-term therapy and is scheduled for 7/25/22. This provider sent the patient several possible long-term providers that accept her  insurance of [employer]. The patient reports that she reached out to one of the providers and discovered that there would be several steps to take in order for her to be able to start with this provider. The patient reports that even discussing the \"red tape\" of working with this provider caused her anxiety to \"skyrocket\". The patient states that she would like to continue to see this provider until she starts with the new provider at [facility] in July.     Today, 4/13/22, " "the patient reports that her main stressors continue to be communication with her partner about the household chores, her children, and discussing money. The patient states that she and her partner communicate very differently and she often becomes frustrated him for his \"lack of response\". The patient states that she has asked herself many times if she can see herself with this partner for \"life long\" and this is something she thinks about a lot. The patient reports that she and her partner communicate very differently and this is a point of contention for her.     Pt expressed comprehension and acceptance of informed consent and the nature of / limitations to confidentiality due to mandated reporting when reviewed in session.     Treatment Objective(s) Addressed in This Session:   learn & utilize at least assertive communication skills weekly  Learn and practice conflict resolution skills     Intervention:   Emotion Focused Therapy: Cycle De-escalation; empathy based techniques; reprocessing  Motivational Interviewing: Active listening, validation, and reassurance    Assessments completed prior to visit:  The following assessments were completed by patient for this visit:  PHQ2:   PHQ-2 ( 1999 [employer]) 4/4/2022 2/28/2022 2/28/2022   Q1: Little interest or pleasure in doing things 1 1 -   Q2: Feeling down, depressed or hopeless 0 1 -   PHQ-2 Score 1 2 -   Q1: Little interest or pleasure in doing things Several days Several days -   Q2: Feeling down, depressed or hopeless Not at all Several days -   PHQ-2 Score 1 Incomplete 2     PHQ9:   PHQ-9 SCORE 2/28/2022 4/4/2022   PHQ-9 Total Score 9 0     GAD2:   AMELIA-2 2/24/2022 4/13/2022   Feeling nervous, anxious, or on edge 3 1   Not being able to stop or control worrying 3 1   AMELIA-2 Total Score 6 2     PROMIS 10-Global Health (all questions and answers displayed):   PROMIS 10 3/11/2022   In general, would you say your health is: Good   In general, would you say your " quality of life is: Good   In general, how would you rate your physical health? Good   In general, how would you rate your mental health, including your mood and your ability to think? Fair   In general, how would you rate your satisfaction with your social activities and relationships? Fair   In general, please rate how well you carry out your usual social activities and roles Fair   To what extent are you able to carry out your everyday physical activities such as walking, climbing stairs, carrying groceries, or moving a chair? Completely   How often have you been bothered by emotional problems such as feeling anxious, depressed or irritable? Often   How would you rate your fatigue on average? Severe   How would you rate your pain on average?   0 = No Pain  to  10 = Worst Imaginable Pain 0   In general, would you say your health is: 3   In general, would you say your quality of life is: 3   In general, how would you rate your physical health? 3   In general, how would you rate your mental health, including your mood and your ability to think? 2   In general, how would you rate your satisfaction with your social activities and relationships? 2   In general, please rate how well you carry out your usual social activities and roles. (This includes activities at home, at work and in your community, and responsibilities as a parent, child, spouse, employee, friend, etc.) 2   To what extent are you able to carry out your everyday physical activities such as walking, climbing stairs, carrying groceries, or moving a chair? 5   In the past 7 days, how often have you been bothered by emotional problems such as feeling anxious, depressed, or irritable? 4   In the past 7 days, how would you rate your fatigue on average? 4   In the past 7 days, how would you rate your pain on average, where 0 means no pain, and 10 means worst imaginable pain? 0   Global Mental Health Score 9   Global Physical Health Score 15   PROMIS TOTAL -  SUBSCORES 24   Some recent data might be hidden         ASSESSMENT: Current Emotional / Mental Status (status of significant symptoms):   Risk status (Self / Other harm or suicidal ideation)   Patient denies current fears or concerns for personal safety.   Patient denies current or recent suicidal ideation or behaviors.   Patient denies current or recent homicidal ideation or behaviors.   Patient denies current or recent self injurious behavior or ideation.   Patient denies other safety concerns.   Patient reports there has been no change in risk factors since their last session.     Patient reports there has been no change in protective factors since their last session.     Recommended that patient call 911 or go to the local ED should there be a change in any of these risk factors.     Appearance:   Appropriate    Eye Contact:   Good    Psychomotor Behavior: Normal    Attitude:   Cooperative    Orientation:   All   Speech    Rate / Production: Emotional Normal     Volume:  Normal    Mood:    Normal Sad    Affect:    Tearful   Thought Content:  Clear    Thought Form:  Coherent  Logical    Insight:    Good      Medication Review:   No changes to current psychiatric medication(s)     Medication Compliance:   Yes     Changes in Health Issues:   None reported     Chemical Use Review:   Substance Use: Chemical use reviewed, no active concerns identified      Tobacco Use: No current tobacco use.      Diagnosis:  1. AMELIA (generalized anxiety disorder) F41.1   2. Adjustment disorder with mixed anxiety and depressed mood F43.23       Collateral Reports Completed:   Not Applicable    PLAN: (Patient Tasks / Therapist Tasks / Other)  Pt will review conflict resolution information sent via email  Pt will continue to practice distress tolerance techniques        OLAMIDE Ervin, April 13, 2022                                                        ______________________________________________________________________    Individual Treatment Plan    Patient's Name: Albertina Rojas  YOB: 1985    Date of Creation: 4/13/2022  Date Treatment Plan Last Reviewed/Revised: New 4/13/2022    DSM5 Diagnoses: 300.02 (F41.1) Generalized Anxiety Disorder or Adjustment Disorders  309.28 (F43.23) With mixed anxiety and depressed mood  Psychosocial / Contextual Factors: Co-parenting 5 y.o. with ex- (conflict); high stress job; relationship conflict  PROMIS (reviewed every 90 days): 24    Referral / Collaboration:  Referral to another professional/service is not indicated at this time..    Anticipated number of session for this episode of care: 1xweek until July, 2022  Anticipation frequency of session: Weekly  Anticipated Duration of each session: 38-52 minutes  Treatment plan will be reviewed in 90 days or when goals have been changed.       MeasurableTreatment Goal(s) related to diagnosis / functional impairment(s)  Goal 1: Patient will increase awareness of anxiety and their impact on functioning and develop skills to reduce negative impact.    I will know I've met my goal when I can identify anxiety triggers and use coping skills to manage symptoms.      Objective #A (Patient Action)    Patient will describe thoughts, feelings, and actions associated with anxiety.  Status: New - Date: 4/13/2022     Intervention(s)  Therapist will explore and process with patient how anxiety has impacted them.    Objective #B  Patient will increase distress tolerance coping sklls.  Status: New - Date: 4/13/2022     Intervention(s)  Therapist will teach distraction skills. and will model their use.        Goal 2: Patient will identify anxiety triggers and maladaptive responses to them     Objective #A (Patient Action)    Status: New - Date: 4/13/2022     Patient will identify situations, thoughts, and behaviors that trigger anxiety.    Intervention(s)  Therapist will  facilitate guided discussion.    Objective #B  Patient will identify thoughts and beliefs about self and the world as they relate to anxiety.    Status: New - Date: 4/13/2022     Intervention(s)  Therapist will guide discussion and assist patient in identification of negative beliefs.  .          Patient has reviewed and agreed to the above plan.      Dara Mendes, [facility]  April 13, 2022

## 2022-04-20 ENCOUNTER — TELEPHONE (OUTPATIENT)
Dept: BEHAVIORAL HEALTH | Facility: CLINIC | Age: 37
End: 2022-04-20
Payer: OTHER GOVERNMENT

## 2022-04-20 NOTE — TELEPHONE ENCOUNTER
Writer gave pt a call and left VM about rescheduling appointment for today as clinicians is out sick. Writer provided TC contact information and encouraged a call back. Pt has return appointment scheduled for 04/27/22.    Jessica Lay  04/20/22  850

## 2022-04-27 ENCOUNTER — VIRTUAL VISIT (OUTPATIENT)
Dept: BEHAVIORAL HEALTH | Facility: CLINIC | Age: 37
End: 2022-04-27
Payer: OTHER GOVERNMENT

## 2022-04-27 DIAGNOSIS — F41.1 GAD (GENERALIZED ANXIETY DISORDER): Primary | ICD-10-CM

## 2022-04-27 NOTE — PROGRESS NOTES
[facility]                                    Progress Note    Patient Name: Albertina Rojas  Date: 4/27/2022         Service Type: Individual      Session Start Time: 1400  Session End Time: 1456     Session Length: 56 Minutes    Session #: 5    Attendees: Client attended alone    Service Modality:  Video Visit:      Provider verified identity through the following two step process.  Patient provided:  Patient is known previously to provider    Telemedicine Visit: The patient's condition can be safely assessed and treated via synchronous audio and visual telemedicine encounter.      Reason for Telemedicine Visit: Services only offered telehealth    Originating Site (Patient Location): Patient's home    Distant Site (Provider Location): [facility] MENTAL HEALTH & ADDICTION SERVICES    Consent:  The patient/guardian has verbally consented to: the potential risks and benefits of telemedicine (video visit) versus in person care; bill my insurance or make self-payment for services provided; and responsibility for payment of non-covered services.     Patient would like the video invitation sent by:  phone visit    Mode of Communication:  Video Conference via Amwell    As the provider I attest to compliance with applicable laws and regulations related to telemedicine.    DATA  Interactive Complexity: No  Crisis: No        Progress Since Last Session (Related to Symptoms / Goals / Homework):   Symptoms: Worsening increased symptoms of anxiety due to work stress    Homework: n/a      Episode of Care Goals: Minimal progress - PREPARATION (Decided to change - considering how); Intervened by negotiating a change plan and determining options / strategies for behavior change, identifying triggers, exploring social supports, and working towards setting a date to begin behavior change     Current / Ongoing Stressors and Concerns:    Pt referred to the Transition Clinic by her  "primary care physician at [facility] on 3/1/2022. Per chart review, Albertina is new to [facility]. She was previously living in [facility] and recently moved to [place]. She is here today to discuss anxiety. She feels that she has had anxiety for years. She has been able to stay busy with her schedule and will typically have good and bad days. She often has thought about making an appointment on the bad days, but then the good days seem to outweigh and she does not schedule. Patient was referred to the [facility] for long-term therapy and is scheduled for 7/25/22. This provider sent the patient several possible long-term providers that accept her  insurance of [employer]. The patient reports that she reached out to one of the providers and discovered that there would be several steps to take in order for her to be able to start with this provider. The patient reports that even discussing the \"red tape\" of working with this provider caused her anxiety to \"skyrocket\". The patient states that she would like to continue to see this provider until she starts with the new provider at [facility] in July.     Today, 4/27/2022, the patient states she is feeling highly anxious and overwhelmed due to high work stress. Several of the patients coworkers have been leaving for different jobs and this has caused her to have an increase in her workload. The patient reports that now that these people have decided to leave, they have \"checked out\" and all the extra work has fallen on her.          Treatment Objective(s) Addressed in This Session:   use relaxation strategies as needed to reduce the physical symptoms of anxiety  compile a list of boundaries that they would like to set with others at work and in her personal relationships     Intervention:   DBT: reviewed distress tolerance techniques and modeled their use   Emotion Focused Therapy: Cycle De-escalation; empathy based techniques; reprocessing  Motivational " Interviewing: Active listening, validation, and reassurance    Assessments completed prior to visit:  The following assessments were completed by patient for this visit:  PHQ9:   PHQ-9 SCORE 2/28/2022 4/4/2022   PHQ-9 Total Score 9 0     GAD7:   AMELIA-7 SCORE 2/24/2022 2/28/2022 4/4/2022   Total Score 17 (severe anxiety) - 8 (mild anxiety)   Total Score 17 10 8     PROMIS 10-Global Health (all questions and answers displayed):   PROMIS 10 3/11/2022   In general, would you say your health is: Good   In general, would you say your quality of life is: Good   In general, how would you rate your physical health? Good   In general, how would you rate your mental health, including your mood and your ability to think? Fair   In general, how would you rate your satisfaction with your social activities and relationships? Fair   In general, please rate how well you carry out your usual social activities and roles Fair   To what extent are you able to carry out your everyday physical activities such as walking, climbing stairs, carrying groceries, or moving a chair? Completely   How often have you been bothered by emotional problems such as feeling anxious, depressed or irritable? Often   How would you rate your fatigue on average? Severe   How would you rate your pain on average?   0 = No Pain  to  10 = Worst Imaginable Pain 0   In general, would you say your health is: 3   In general, would you say your quality of life is: 3   In general, how would you rate your physical health? 3   In general, how would you rate your mental health, including your mood and your ability to think? 2   In general, how would you rate your satisfaction with your social activities and relationships? 2   In general, please rate how well you carry out your usual social activities and roles. (This includes activities at home, at work and in your community, and responsibilities as a parent, child, spouse, employee, friend, etc.) 2   To what extent are  you able to carry out your everyday physical activities such as walking, climbing stairs, carrying groceries, or moving a chair? 5   In the past 7 days, how often have you been bothered by emotional problems such as feeling anxious, depressed, or irritable? 4   In the past 7 days, how would you rate your fatigue on average? 4   In the past 7 days, how would you rate your pain on average, where 0 means no pain, and 10 means worst imaginable pain? 0   Global Mental Health Score 9   Global Physical Health Score 15   PROMIS TOTAL - SUBSCORES 24   Some recent data might be hidden         ASSESSMENT: Current Emotional / Mental Status (status of significant symptoms):   Risk status (Self / Other harm or suicidal ideation)   Patient denies current fears or concerns for personal safety.   Patient denies current or recent suicidal ideation or behaviors.   Patient denies current or recent homicidal ideation or behaviors.   Patient denies current or recent self injurious behavior or ideation.   Patient denies other safety concerns.   Patient reports there has been no change in risk factors since their last session.     Patient reports there has been no change in protective factors since their last session.     Recommended that patient call 911 or go to the local ED should there be a change in any of these risk factors.     Appearance:   Appropriate    Eye Contact:   Good    Psychomotor Behavior: Normal    Attitude:   Cooperative    Orientation:   All   Speech    Rate / Production: Normal     Volume:  Normal    Mood:    Normal   Affect:    Appropriate    Thought Content:  Clear    Thought Form:  Coherent  Logical    Insight:    Good      Medication Review:   No changes to current psychiatric medication(s)     Medication Compliance:   Yes     Changes in Health Issues:   None reported     Chemical Use Review:   Substance Use: Chemical use reviewed, no active concerns identified      Tobacco Use: No current tobacco use.       Diagnosis:  1. 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     Anticipated number of session for this episode of care: 1xweek until July, 2022  Anticipation frequency of session: Weekly  Anticipated Duration of each session: 38-52 minutes  Treatment plan will be reviewed in 90 days or when goals have been changed.         MeasurableTreatment Goal(s) related to diagnosis / functional impairment(s)  Goal 1: Patient will increase awareness of anxiety and their impact on functioning and develop skills to reduce negative impact.    I will know I've met my goal when I can identify anxiety triggers and use coping skills to manage symptoms.       Objective #A (Patient Action)                          Patient will describe thoughts, feelings, and actions associated with anxiety.  Status: New - Date: 4/13/2022      Intervention(s)  Therapist will explore and process with patient how  anxiety has impacted them.     Objective #B  Patient will increase distress tolerance coping sklls.  Status: New - Date: 4/13/2022      Intervention(s)  Therapist will teach distraction skills. and will model their use.           Goal 2: Patient will identify anxiety triggers and maladaptive responses to them     Objective #A (Patient Action)                          Status: New - Date: 4/13/2022      Patient will identify situations, thoughts, and behaviors that trigger anxiety.     Intervention(s)  Therapist will facilitate guided discussion.     Objective #B  Patient will identify thoughts and beliefs about self and the world as they relate to anxiety.                         Status: New - Date: 4/13/2022      Intervention(s)  Therapist will guide discussion and assist patient in identification of negative beliefs.  .              Patient has reviewed and agreed to the above plan.

## 2022-05-04 ENCOUNTER — VIRTUAL VISIT (OUTPATIENT)
Dept: BEHAVIORAL HEALTH | Facility: CLINIC | Age: 37
End: 2022-05-04
Payer: OTHER GOVERNMENT

## 2022-05-04 DIAGNOSIS — F41.1 GAD (GENERALIZED ANXIETY DISORDER): Primary | ICD-10-CM

## 2022-05-04 NOTE — PROGRESS NOTES
[facility]                                    Progress Note    Patient Name: Albertina Rojas  Date: 5/4/2022         Service Type: Individual      Session Start Time: 1400  Session End Time: 1501     Session Length: 61 Minutes    Session #: 6    Attendees: Client attended alone    Service Modality:  Video Visit:      Provider verified identity through the following two step process.  Patient provided:  Patient is known previously to provider    Telemedicine Visit: The patient's condition can be safely assessed and treated via synchronous audio and visual telemedicine encounter.      Reason for Telemedicine Visit: Services only offered telehealth    Originating Site (Patient Location): Patient's other patients car    Distant Site (Provider Location): [facility] MENTAL HEALTH & ADDI[facility] SERVICES    Consent:  The patient/guardian has verbally consented to: the potential risks and benefits of telemedicine (video visit) versus in person care; bill my insurance or make self-payment for services provided; and responsibility for payment of non-covered services.     Patient would like the video invitation sent by:  Text to cell phone: [phone]    Mode of Communication:  Video Conference via Amwell    As the provider I attest to compliance with applicable laws and regulations related to telemedicine.    DATA  Interactive Complexity: No  Crisis: No        Progress Since Last Session (Related to Symptoms / Goals / Homework):   Symptoms: No change no significant change in overall symptoms. Continues to endorse anxiety symptoms attributed to work stress    Homework: n/a      Episode of Care Goals: Satisfactory progress - ACTION (Actively working towards change); Intervened by reinforcing change plan / affirming steps taken     Current / Ongoing Stressors and Concerns:   Pt referred to the Transition Clinic by her primary care physician at [facility] on 3/1/2022.  "Per chart review, Albertina is new to [facility]. She was previously living in [facility] and recently moved to [place]. She is here today to discuss anxiety. She feels that she has had anxiety for years. She has been able to stay busy with her schedule and will typically have good and bad days. She often has thought about making an appointment on the bad days, but then the good days seem to outweigh and she does not schedule. Patient was referred to the [facility] for long-term therapy and is scheduled for 7/25/22. This provider sent the patient several possible long-term providers that accept her  insurance of [facility]. The patient reports that she reached out to one of the providers and discovered that there would be several steps to take in order for her to be able to start with this provider. The patient reports that even discussing the \"red tape\" of working with this provider caused her anxiety to \"skyrocket\". The patient states that she would like to continue to see this provider until she starts with the new provider at [facility] in July.     Today, 5/4/2022, the patient reports that her stress continues to be consistently present due to ongoing stress at work and responsibilities increasing with her workload. The patient had a family court hearing today regarding her parenting agreement with her ex- for her 3 y.o. son. The patient reports that her ex is making the process more difficult because he \"fights to win\" and that his requests are unreasonable and not in the child's best interest. No final decisions were solidified during the hearing on 5/03/2022 and they are set to revisit the agreement on 6/6/2022. The patient is concerned about her legal fees continuing to increase.     The patient also reports that things have been stressful in her relationship around the topic of money. The patient states that she feels that finances aren't balanced and this is frustrating for her.    Pt expressed " comprehension and acceptance of informed consent and the nature of / limitations to confidentiality due to mandated reporting when reviewed in session.     Treatment Objective(s) Addressed in This Session:   use relaxation strategies as needed to reduce the physical symptoms of anxiety  compile a list of boundaries that they would like to set with others at work and in her personal relationships     Intervention:    DBT: reviewed distress tolerance techniques and modeled their use   Emotion Focused Therapy: Cycle De-escalation; empathy based techniques; reprocessing  Motivational Interviewing: Active listening, validation, and reassurance    Assessments completed prior to visit:  The following assessments were completed by patient for this visit:  PHQ9:   PHQ-9 SCORE 2/28/2022 4/4/2022   PHQ-9 Total Score 9 0     GAD7:   AMELIA-7 SCORE 2/24/2022 2/28/2022 4/4/2022   Total Score 17 (severe anxiety) - 8 (mild anxiety)   Total Score 17 10 8     PROMIS 10-Global Health (all questions and answers displayed):   PROMIS 10 3/11/2022   In general, would you say your health is: Good   In general, would you say your quality of life is: Good   In general, how would you rate your physical health? Good   In general, how would you rate your mental health, including your mood and your ability to think? Fair   In general, how would you rate your satisfaction with your social activities and relationships? Fair   In general, please rate how well you carry out your usual social activities and roles Fair   To what extent are you able to carry out your everyday physical activities such as walking, climbing stairs, carrying groceries, or moving a chair? Completely   How often have you been bothered by emotional problems such as feeling anxious, depressed or irritable? Often   How would you rate your fatigue on average? Severe   How would you rate your pain on average?   0 = No Pain  to  10 = Worst Imaginable Pain 0   In general, would you  say your health is: 3   In general, would you say your quality of life is: 3   In general, how would you rate your physical health? 3   In general, how would you rate your mental health, including your mood and your ability to think? 2   In general, how would you rate your satisfaction with your social activities and relationships? 2   In general, please rate how well you carry out your usual social activities and roles. (This includes activities at home, at work and in your community, and responsibilities as a parent, child, spouse, employee, friend, etc.) 2   To what extent are you able to carry out your everyday physical activities such as walking, climbing stairs, carrying groceries, or moving a chair? 5   In the past 7 days, how often have you been bothered by emotional problems such as feeling anxious, depressed, or irritable? 4   In the past 7 days, how would you rate your fatigue on average? 4   In the past 7 days, how would you rate your pain on average, where 0 means no pain, and 10 means worst imaginable pain? 0   Global Mental Health Score 9   Global Physical Health Score 15   PROMIS TOTAL - SUBSCORES 24   Some recent data might be hidden     [place] Suicide Severity Rating Scale (Lifetime/Recent)No flowsheet data found.      ASSESSMENT: Current Emotional / Mental Status (status of significant symptoms):   Risk status (Self / Other harm or suicidal ideation)   Patient denies current fears or concerns for personal safety.   Patient denies current or recent suicidal ideation or behaviors.   Patient denies current or recent homicidal ideation or behaviors.   Patient denies current or recent self injurious behavior or ideation.   Patient denies other safety concerns.   Patient reports there has been no change in risk factors since their last session.     Patient reports there has been no change in protective factors since their last session.     Recommended that patient call 911 or go to the local ED should  there be a change in any of these risk factors.     Appearance:   Appropriate    Eye Contact:   Good    Psychomotor Behavior: Normal    Attitude:   Cooperative    Orientation:   All   Speech    Rate / Production: Normal     Volume:  Normal    Mood:    Normal   Affect:    Appropriate    Thought Content:  Clear    Thought Form:  Coherent  Logical    Insight:    Good      Medication Review:   No changes to current psychiatric medication(s)     Medication Compliance:   Yes     Changes in Health Issues:   None reported     Chemical Use Review:   Substance Use: Chemical use reviewed, no active concerns identified      Tobacco Use: No current tobacco use.      Diagnosis:  1. 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     Anticipated number of session for this episode of care: 1xweek until July, 2022  Anticipation frequency of session: Weekly  Anticipated Duration of each session: 38-52  minutes  Treatment plan will be reviewed in 90 days or when goals have been changed.         MeasurableTreatment Goal(s) related to diagnosis / functional impairment(s)  Goal 1: Patient will increase awareness of anxiety and their impact on functioning and develop skills to reduce negative impact.    I will know I've met my goal when I can identify anxiety triggers and use coping skills to manage symptoms.       Objective #A (Patient Action)                          Patient will describe thoughts, feelings, and actions associated with anxiety.  Status: New - Date: 4/13/2022      Intervention(s)  Therapist will explore and process with patient how anxiety has impacted them.     Objective #B  Patient will increase distress tolerance coping sklls.  Status: New - Date: 4/13/2022      Intervention(s)  Therapist will teach distraction skills. and will model their use.           Goal 2: Patient will identify anxiety triggers and maladaptive responses to them     Objective #A (Patient Action)                          Status: New - Date: 4/13/2022      Patient will identify situations, thoughts, and behaviors that trigger anxiety.     Intervention(s)  Therapist will facilitate guided discussion.     Objective #B  Patient will identify thoughts and beliefs about self and the world as they relate to anxiety.                         Status: New - Date: 4/13/2022      Intervention(s)  Therapist will guide discussion and assist patient in identification of negative beliefs.  .              Patient has reviewed and agreed to the above plan.

## 2022-05-11 ENCOUNTER — TELEPHONE (OUTPATIENT)
Dept: BEHAVIORAL HEALTH | Facility: CLINIC | Age: 37
End: 2022-05-11
Payer: OTHER GOVERNMENT

## 2022-05-11 NOTE — PROGRESS NOTES
[facility]                                    Progress Note    Patient Name: Albertina Rojas  Date: 5/12/2022         Service Type: Individual      Session Start Time: 1400  Session End Time: 1432     Session Length: 32 Minutes    Session #: 7    Attendees: Client attended alone    Service Modality:  Video Visit:      Provider verified identity through the following two step process.  Patient provided:  Patient is known previously to provider    Telemedicine Visit: The patient's condition can be safely assessed and treated via synchronous audio and visual telemedicine encounter.      Reason for Telemedicine Visit: Services only offered telehealth    Originating Site (Patient Location): Patient's other car    Distant Site (Provider Location): [facility] MENTAL HEALTH & ADDICTION SERVICES    Consent:  The patient/guardian has verbally consented to: the potential risks and benefits of telemedicine (video visit) versus in person care; bill my insurance or make self-payment for services provided; and responsibility for payment of non-covered services.     Patient would like the video invitation sent by:  Text to cell phone: [phone]    Mode of Communication:  Video Conference via Amwell    As the provider I attest to compliance with applicable laws and regulations related to telemedicine.    DATA  Interactive Complexity: No  Crisis: No        Progress Since Last Session (Related to Symptoms / Goals / Homework):   Symptoms: Improving patient reports an overall improvement in mood. Sleeping well; notices she isn't as irritable and has been better able to manage her stress    Homework: n/a      Episode of Care Goals: Satisfactory progress - ACTION (Actively working towards change); Intervened by reinforcing change plan / affirming steps taken     Current / Ongoing Stressors and Concerns:   Pt referred to the Transition Clinic by her primary care physician at [place]  [facility] on 3/1/2022. Per chart review, Albertina is new to [facility]. She was previously living in [facility] [place] and recently moved to [place]. She is here today to discuss anxiety. She feels that she has had anxiety for years. She has been able to stay busy with her schedule and will typically have good and bad days. She often has thought about making an appointment on the bad days, but then the good days seem to outweigh and she does not schedule. Patient was referred to the [facility] for long-term therapy and is scheduled for 7/25/22. This provider sent the patient several possible long-term providers that accept her  insurance of [employer]. The patient reports that she reached out to one of the providers and discovered that there would be several steps to take in order for her to be able to start with this provider. The patient reports that even discussing the \"red tape\" of working with this provider caused her anxiety to \"skyrocket\". The patient states that she would like to continue to see this provider until she starts with the new provider at [facility] in July.     Today, 5/12/2022, the patient states that her stress at work has been more manageable. The patient reports that she had a meeting with her two supervisors at which point she presented a list of roles and tasks she has been completing. After this presentation, she states that her supervisors stated that they were unaware of how much she had been doing and they told her that she will not have to take on any additional duties where at first, this was the plan. The patient reports feeling validated and more supported at work.     The patient reports that she has been experiencing tension headaches when her stress increases. This provider guided patient in discussion on paired muscle relaxation, yoga, and deep breathing as an strategy to combat these headaches.     Pt expressed comprehension and acceptance of informed consent and the nature of / " limitations to confidentiality due to mandated reporting when reviewed in session.     Treatment Objective(s) Addressed in This Session:   use relaxation strategies to reduce the physical symptoms of anxiety  Use deep breathing exercises when anxiety begins to increase     Intervention:   DBT: reviewed distress tolerance techniques and modeled their use; paired muscle relaxation; yoga  Motivational Interviewing: Active listening, validation, and reassurance       Assessments completed prior to visit:  The following assessments were completed by patient for this visit:  PHQ9:   PHQ-9 SCORE 2/28/2022 4/4/2022   PHQ-9 Total Score 9 0     GAD7:   AMELIA-7 SCORE 2/24/2022 2/28/2022 4/4/2022   Total Score 17 (severe anxiety) - 8 (mild anxiety)   Total Score 17 10 8     PROMIS 10-Global Health (all questions and answers displayed):   PROMIS 10 3/11/2022   In general, would you say your health is: Good   In general, would you say your quality of life is: Good   In general, how would you rate your physical health? Good   In general, how would you rate your mental health, including your mood and your ability to think? Fair   In general, how would you rate your satisfaction with your social activities and relationships? Fair   In general, please rate how well you carry out your usual social activities and roles Fair   To what extent are you able to carry out your everyday physical activities such as walking, climbing stairs, carrying groceries, or moving a chair? Completely   How often have you been bothered by emotional problems such as feeling anxious, depressed or irritable? Often   How would you rate your fatigue on average? Severe   How would you rate your pain on average?   0 = No Pain  to  10 = Worst Imaginable Pain 0   In general, would you say your health is: 3   In general, would you say your quality of life is: 3   In general, how would you rate your physical health? 3   In general, how would you rate your mental  health, including your mood and your ability to think? 2   In general, how would you rate your satisfaction with your social activities and relationships? 2   In general, please rate how well you carry out your usual social activities and roles. (This includes activities at home, at work and in your community, and responsibilities as a parent, child, spouse, employee, friend, etc.) 2   To what extent are you able to carry out your everyday physical activities such as walking, climbing stairs, carrying groceries, or moving a chair? 5   In the past 7 days, how often have you been bothered by emotional problems such as feeling anxious, depressed, or irritable? 4   In the past 7 days, how would you rate your fatigue on average? 4   In the past 7 days, how would you rate your pain on average, where 0 means no pain, and 10 means worst imaginable pain? 0   Global Mental Health Score 9   Global Physical Health Score 15   PROMIS TOTAL - SUBSCORES 24   Some recent data might be hidden     ASSESSMENT: Current Emotional / Mental Status (status of significant symptoms):   Risk status (Self / Other harm or suicidal ideation)   Patient denies current fears or concerns for personal safety.   Patient denies current or recent suicidal ideation or behaviors.   Patient denies current or recent homicidal ideation or behaviors.   Patient denies current or recent self injurious behavior or ideation.   Patient denies other safety concerns.   Patient reports there has been no change in risk factors since their last session.     Patient reports there has been no change in protective factors since their last session.     Recommended that patient call 911 or go to the local ED should there be a change in any of these risk factors.     Appearance:   Appropriate    Eye Contact:   Good    Psychomotor Behavior: Normal    Attitude:   Cooperative  Friendly Pleasant   Orientation:   All   Speech    Rate / Production: Normal     Volume:  Normal     Mood:    Normal   Affect:    Appropriate    Thought Content:  Clear    Thought Form:  Coherent  Logical    Insight:    Good      Medication Review:   No changes to current psychiatric medication(s)     Medication Compliance:   Yes     Changes in Health Issues:   None reported     Chemical Use Review:   Substance Use: Chemical use reviewed, no active concerns identified      Tobacco Use: No current tobacco use.      Diagnosis:  1. AMELIA (generalized anxiety disorder) F41.1       Collateral Reports Completed:   Not Applicable    PLAN: (Patient Tasks / Therapist Tasks / Other)  Patient will review Paired Muscle Relaxation video sent via email and will practice use when physical symptoms from anxiety present  Pt will practice deep breathing exercises        Dara Mendes [facility], May 12, 2022                                                         ______________________________________________________________________    Individual Treatment Plan     Patient's Name: Albertina Rojas                    YOB: 1985     Date of Creation: 4/13/2022  Date Treatment Plan Last Reviewed/Revised: New 4/13/2022     DSM5 Diagnoses: 300.02 (F41.1) Generalized Anxiety Disorder or Adjustment Disorders  309.28 (F43.23) With mixed anxiety and depressed mood  Psychosocial / Contextual Factors: Co-parenting 5 y.o. 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     Anticipated number of session for this episode of care: 1xweek until July, 2022  Anticipation frequency of session: Weekly  Anticipated Duration of each session: 38-52 minutes  Treatment plan will be reviewed in 90 days or when goals have been changed.         MeasurableTreatment Goal(s) related to diagnosis / functional impairment(s)  Goal 1: Patient will increase awareness of anxiety and their impact  on functioning and develop skills to reduce negative impact.    I will know I've met my goal when I can identify anxiety triggers and use coping skills to manage symptoms.       Objective #A (Patient Action)                          Patient will describe thoughts, feelings, and actions associated with anxiety.  Status: New - Date: 4/13/2022      Intervention(s)  Therapist will explore and process with patient how anxiety has impacted them.     Objective #B  Patient will increase distress tolerance coping sklls.  Status: New - Date: 4/13/2022      Intervention(s)  Therapist will teach distraction skills. and will model their use.           Goal 2: Patient will identify anxiety triggers and maladaptive responses to them     Objective #A (Patient Action)                          Status: New - Date: 4/13/2022      Patient will identify situations, thoughts, and behaviors that trigger anxiety.     Intervention(s)  Therapist will facilitate guided discussion.     Objective #B  Patient will identify thoughts and beliefs about self and the world as they relate to anxiety.                         Status: New - Date: 4/13/2022      Intervention(s)  Therapist will guide discussion and assist patient in identification of negative beliefs.  .              Patient has reviewed and agreed to the above plan.

## 2022-05-11 NOTE — TELEPHONE ENCOUNTER
Writer gave pt a call as requested by provider and moved 05/11/22 @ 2pm appointment to 05/12/22 @ 2pm as provider was having technical problems.    Jessica Lay  05/11/22  200

## 2022-05-12 ENCOUNTER — VIRTUAL VISIT (OUTPATIENT)
Dept: BEHAVIORAL HEALTH | Facility: CLINIC | Age: 37
End: 2022-05-12
Payer: OTHER GOVERNMENT

## 2022-05-12 DIAGNOSIS — F41.1 GAD (GENERALIZED ANXIETY DISORDER): Primary | ICD-10-CM

## 2022-05-18 ENCOUNTER — TELEPHONE (OUTPATIENT)
Dept: BEHAVIORAL HEALTH | Facility: CLINIC | Age: 37
End: 2022-05-18
Payer: OTHER GOVERNMENT

## 2022-05-24 ENCOUNTER — OFFICE VISIT (OUTPATIENT)
Dept: FAMILY MEDICINE | Facility: CLINIC | Age: 37
End: 2022-05-24
Payer: OTHER GOVERNMENT

## 2022-05-24 VITALS
WEIGHT: 192 LBS | TEMPERATURE: 98.1 F | HEIGHT: 66 IN | BODY MASS INDEX: 30.86 KG/M2 | OXYGEN SATURATION: 98 % | HEART RATE: 70 BPM | DIASTOLIC BLOOD PRESSURE: 84 MMHG | SYSTOLIC BLOOD PRESSURE: 128 MMHG

## 2022-05-24 DIAGNOSIS — M79.672 LEFT FOOT PAIN: Primary | ICD-10-CM

## 2022-05-24 PROCEDURE — 99213 OFFICE O/P EST LOW 20 MIN: CPT | Performed by: FAMILY MEDICINE

## 2022-05-24 ASSESSMENT — PAIN SCALES - GENERAL: PAINLEVEL: WORST PAIN (10)

## 2022-05-24 NOTE — PROGRESS NOTES
Assessment & Plan     Left foot pain  Patient is here for concern of left foot pain and requesting referral to podiatry.  She has been having left foot pain over the past several years.  Had x-ray done in 2016 showed Small plantar calcaneal spur. Questionable small erosion involving the plantar calcaneus measuring 2 mm. Underlying inflammatory arthropathy is a possibility. MRI could be useful to further evaluate if clinically indicated. Subtalar joint   spaces appear preserved. No abnormal periosteal reaction. No radiopaque foreign body identified.  No recent injury.  She also has a history of plantar fasciitis in the same foot  Discussed with patient her concern.  Will refer to podiatry for further evaluation management.  - Orthopedic  Referral; Future                 Return in about 3 months (around 8/24/2022), or if symptoms worsen or fail to improve, for Follow up.    Alvaro Alamo MD  [facility]    Patria Eng is a 36 year old who presents for the following health issues     History of Present Illness       Reason for visit:  Bone spur on left heel. Need referal to talk about options to fix issue/pain    She eats 2-3 servings of fruits and vegetables daily.She consumes 1 sweetened beverage(s) daily.She exercises with enough effort to increase her heart rate 9 or less minutes per day.  She exercises with enough effort to increase her heart rate 3 or less days per week.   She is taking medications regularly.     Had X ray in 2016 showed :    IMPRESSION: Small plantar calcaneal spur. Questionable small erosion involving the plantar calcaneus measuring 2 mm. Underlying inflammatory arthropathy is a possibility. MRI could be useful to further evaluate if clinically indicated. Subtalar joint   spaces appear preserved. No abnormal periosteal reaction. No radiopaque foreign body identified.     Symptoms :  - pain in the morning in the left foot ,   - able to walk     Has been  "doing physical therapy with no improvement           Review of Systems         Objective    /84 (BP Location: Left arm, Patient Position: Sitting, Cuff Size: Adult Regular)   Pulse 70   Temp 98.1  F (36.7  C) (Tympanic)   Ht 1.664 m (5' 5.5\")   Wt 87.1 kg (192 lb)   LMP  (LMP Unknown)   SpO2 98%   BMI 31.46 kg/m    Body mass index is 31.46 kg/m .  Physical Exam                   "

## 2022-05-25 ENCOUNTER — VIRTUAL VISIT (OUTPATIENT)
Dept: BEHAVIORAL HEALTH | Facility: CLINIC | Age: 37
End: 2022-05-25
Payer: OTHER GOVERNMENT

## 2022-05-25 DIAGNOSIS — F41.1 GAD (GENERALIZED ANXIETY DISORDER): Primary | ICD-10-CM
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
## 2022-06-01 ENCOUNTER — VIRTUAL VISIT (OUTPATIENT)
Dept: BEHAVIORAL HEALTH | Facility: CLINIC | Age: 37
End: 2022-06-01
Attending: PSYCHIATRY & NEUROLOGY
Payer: OTHER GOVERNMENT

## 2022-06-01 DIAGNOSIS — F41.1 GAD (GENERALIZED ANXIETY DISORDER): Primary | ICD-10-CM

## 2022-06-01 NOTE — PROGRESS NOTES
[facility]                                    Progress Note    Patient Name: Albertina Rojas  Date: 6/1/2022         Service Type: Individual      Session Start Time: 1420  Session End Time: 1524     Session Length: 64 MInutes    Session #: 9    Attendees: Client attended alone    Service Modality:  Video Visit:      Provider verified identity through the following two step process.  Patient provided:  Patient is known previously to provider    Telemedicine Visit: The patient's condition can be safely assessed and treated via synchronous audio and visual telemedicine encounter.      Reason for Telemedicine Visit: Services only offered telehealth    Originating Site (Patient Location): Patient's other car    Distant Site (Provider Location): [facility]    Consent:  The patient/guardian has verbally consented to: the potential risks and benefits of telemedicine (video visit) versus in person care; bill my insurance or make self-payment for services provided; and responsibility for payment of non-covered services.     Patient would like the video invitation sent by:  Text to cell phone: [phone]    Mode of Communication:  Video Conference via Amwell    As the provider I attest to compliance with applicable laws and regulations related to telemedicine.    DATA  Interactive Complexity: No  Crisis: No        Progress Since Last Session (Related to Symptoms / Goals / Homework):   Symptoms: Worsening increased stress and anxiety due to work stressors; overwhelmed at work     Homework: n/a      Episode of Care Goals: Satisfactory progress - MAINTENANCE (Working to maintain change, with risk of relapse); Intervened by continuing to positively reinforce healthy behavior choice      Current / Ongoing Stressors and Concerns:    Pt referred to the Transition Clinic by her primary care physician at [facility] on 3/1/2022. Per  "chart review, Albertina is new to [facility]. She was previously living in [facility] and recently moved to [place]. She is here today to discuss anxiety. She feels that she has had anxiety for years. She has been able to stay busy with her schedule and will typically have good and bad days. She often has thought about making an appointment on the bad days, but then the good days seem to outweigh and she does not schedule. Patient was referred to the [facility] for long-term therapy and is scheduled for 7/25/22. This provider sent the patient several possible long-term providers that accept her  insurance of [employer]. The patient reports that she reached out to one of the providers and discovered that there would be several steps to take in order for her to be able to start with this provider. The patient reports that even discussing the \"red tape\" of working with this provider caused her anxiety to \"skyrocket\". The patient states that she would like to continue to see this provider until she starts with the new provider at [facility] in July.     Today, June 1, 2022, the patient states she continues to experience high stress at work that increase her anxiety symptoms. Pt states that she finds it challenging to communicate with her supervisor as he is \"on his way out\" to transfer to another position. The patient also states that parenting continues to be an ongoing stressor and balancing work/life while trying to co-parent with her son's fathers and that during the weeks that she has her 3 y.o., she notices that her and her partner have more arguments and don't get along as well. The patient states that she doesn't feel like her partner helps as much as he should and this continues to be an ongoing issue in their relationship.    Pt expressed comprehension and acceptance of informed consent and the nature of / limitations to confidentiality due to mandated reporting when reviewed in session.     Treatment " Objective(s) Addressed in This Session:   identify three distraction and diversion activities and use those activities to decrease level of anxiety      Intervention:   CBT: guided discussion on anxiety triggers and reviewed distress tolerance techniques  Motivational Interviewing: active listening, validation, and reassurance     Assessments completed prior to visit:  The following assessments were completed by patient for this visit:  PHQ9:   PHQ-9 SCORE 2/28/2022 4/4/2022   PHQ-9 Total Score 9 0     GAD7:   AMELIA-7 SCORE 2/24/2022 2/28/2022 4/4/2022   Total Score 17 (severe anxiety) - 8 (mild anxiety)   Total Score 17 10 8         ASSESSMENT: Current Emotional / Mental Status (status of significant symptoms):   Risk status (Self / Other harm or suicidal ideation)   Patient denies current fears or concerns for personal safety.   Patient denies current or recent suicidal ideation or behaviors.   Patient denies current or recent homicidal ideation or behaviors.   Patient denies current or recent self injurious behavior or ideation.   Patient denies other safety concerns.   Patient reports there has been no change in risk factors since their last session.     Patient reports there has been no change in protective factors since their last session.     Recommended that patient call 911 or go to the local ED should there be a change in any of these risk factors.     Appearance:   Appropriate    Eye Contact:   Good    Psychomotor Behavior: Normal    Attitude:   Cooperative    Orientation:   All   Speech    Rate / Production: Normal     Volume:  Normal    Mood:    Normal   Affect:    Appropriate    Thought Content:  Clear    Thought Form:  Coherent  Logical    Insight:    Good      Medication Review:   No changes to current psychiatric medication(s)     Medication Compliance:   Yes     Changes in Health Issues:   None reported     Chemical Use Review:   Substance Use: Chemical use reviewed, no active concerns identified       Tobacco Use: No current tobacco use.      Diagnosis:  1. AMELIA (generalized anxiety disorder) F41.1       Collateral Reports Completed:   Not Applicable    PLAN: (Patient Tasks / Therapist Tasks / Other)  Patient will communicate with her partner about expectations in the house and around parenting  Patient will take time to herself to reflect and decompress        Dara Mendes [facility], June 1, 2022                                                         ______________________________________________________________________    Individual Treatment Plan     Patient's Name: Albertina Rojas                    YOB: 1985     Date of Creation: 4/13/2022  Date Treatment Plan Last Reviewed/Revised: New 4/13/2022     DSM5 Diagnoses: 300.02 (F41.1) Generalized Anxiety Disorder or Adjustment Disorders  309.28 (F43.23) With mixed anxiety and depressed mood  Psychosocial / Contextual Factors: Co-parenting 5 y.o. 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     Anticipated number of session for this episode of care: 1xweek until July, 2022  Anticipation frequency of session: Weekly  Anticipated Duration of each session: 38-52 minutes  Treatment plan will be reviewed in 90 days or when goals have been changed.         MeasurableTreatment Goal(s) related to diagnosis / functional impairment(s)  Goal 1: Patient will increase awareness of anxiety and their impact on functioning and develop skills to reduce negative impact.    I will know I've met my goal when I can identify anxiety triggers and use coping skills to manage symptoms.       Objective #A (Patient Action)                          Patient will describe thoughts, feelings, and actions associated with anxiety.  Status: New - Date: 4/13/2022      Intervention(s)  Therapist will explore and  process with patient how anxiety has impacted them.     Objective #B  Patient will increase distress tolerance coping sklls.  Status: New - Date: 4/13/2022      Intervention(s)  Therapist will teach distraction skills. and will model their use.           Goal 2: Patient will identify anxiety triggers and maladaptive responses to them     Objective #A (Patient Action)                          Status: New - Date: 4/13/2022      Patient will identify situations, thoughts, and behaviors that trigger anxiety.     Intervention(s)  Therapist will facilitate guided discussion.     Objective #B  Patient will identify thoughts and beliefs about self and the world as they relate to anxiety.                         Status: New - Date: 4/13/2022      Intervention(s)  Therapist will guide discussion and assist patient in identification of negative beliefs.  .              Patient has reviewed and agreed to the above plan.

## 2022-06-08 ENCOUNTER — VIRTUAL VISIT (OUTPATIENT)
Dept: BEHAVIORAL HEALTH | Facility: CLINIC | Age: 37
End: 2022-06-08
Attending: PSYCHIATRY & NEUROLOGY
Payer: OTHER GOVERNMENT

## 2022-06-08 DIAGNOSIS — F41.1 GAD (GENERALIZED ANXIETY DISORDER): Primary | ICD-10-CM

## 2022-06-08 NOTE — PROGRESS NOTES
[facility]                                    Progress Note    Patient Name: Albertina Rojas  Date: 6/8/2022         Service Type: Individual      Session Start Time: 1400  Session End Time: 1502     Session Length: 62 Minutes    Session #: 10    Attendees: Client attended alone    Service Modality:  Video Visit:      Provider verified identity through the following two step process.  Patient provided:  Patient is known previously to provider    Telemedicine Visit: The patient's condition can be safely assessed and treated via synchronous audio and visual telemedicine encounter.      Reason for Telemedicine Visit: Services only offered telehealth    Originating Site (Patient Location): Patient's home    Distant Site (Provider Location): [facility] MENT[facility] & [facility]    Consent:  The patient/guardian has verbally consented to: the potential risks and benefits of telemedicine (video visit) versus in person care; bill my insurance or make self-payment for services provided; and responsibility for payment of non-covered services.     Patient would like the video invitation sent by:  My Chart    Mode of Communication:  Video Conference via Amwell    As the provider I attest to compliance with applicable laws and regulations related to telemedicine.    DATA  Interactive Complexity: No  Crisis: No        Progress Since Last Session (Related to Symptoms / Goals / Homework):   Symptoms: No change no signficant changes in overall symptoms. Continues to feel overwhelmed and stressed, mostly due to work stressors. Difficulties with sleep    Homework: n/a      Episode of Care Goals: Satisfactory progress - MAINTENANCE (Working to maintain change, with risk of relapse); Intervened by continuing to positively reinforce healthy behavior choice      Current / Ongoing Stressors and Concerns:     Pt referred to the Transition Clinic by her primary care physician at  "[facility] on 3/1/2022. Per chart review, Albertina is new to [facility]. She was previously living in [facility] and recently moved to [place]. She is here today to discuss anxiety. She feels that she has had anxiety for years. She has been able to stay busy with her schedule and will typically have good and bad days. She often has thought about making an appointment on the bad days, but then the good days seem to outweigh and she does not schedule. Patient was referred to the [facility] for long-term therapy and is scheduled for 7/25/22. This provider sent the patient several possible long-term providers that accept her  insurance of [employer]. The patient reports that she reached out to one of the providers and discovered that there would be several steps to take in order for her to be able to start with this provider. The patient reports that even discussing the \"red tape\" of working with this provider caused her anxiety to \"skyrocket\". The patient states that she would like to continue to see this provider until she starts with the new provider at [facility] in July.     Today, 6/8/2022, patient states that she has had a hard time focusing this past few days and feels overwhelmed at work. Patient states she has been having a hard time sleeping as well and this has been effecting her ability to wake up on time. Work continues to be a major stressor for the patient. The patient states she recognizes that she is a completely different person than when she first started her position and feels she has \"hardened\" in her personality and approach with people and doesn't feel like she has empathy towards people like she used to. The patient states she gets frustrated and her emotional response is to cry. The patient states that she cries when she gets angry and than it frustrates her more because she is crying.      Patient states that her [employer] had a suicide training this week and it was a trigger for " "her anxiety as it reminded her of when her supervisor was actively planning to end his life. The patient states that there is an underlying notion in the  that one shouldn't show emotion and/or weakness. \"Old Army vs. New Army\" guidelines/protocols are changing and individuals that have been in the  for several years are having difficulties with the changing of processes (GLBT acceptance; mental health awareness). A lot of the individuals that continue to have the old views are in positions of leadership, therefore, according to the patient, it is more difficult to be open about these issues.     The patient reports that her adolescent son has been resistant to fully engage in his own therapy. The patient states that her son has requested that the patient be present during each session and that she feels he isn't being fully open about things because she is present. Suggested that she speak to her son about her joining the session in the beginning and than having him be alone with the therapist for the majority of the session. Pt stated she will discuss this with her son and his therapist.     Pt expressed comprehension and acceptance of informed consent and the nature of / limitations to confidentiality due to mandated reporting when reviewed in session.       Treatment Objective(s) Addressed in This Session:   identify three distraction and diversion activities and use those activities to decrease level of anxiety    prioritize daily tasks according to most emergent and identify tools to communicate needs to supervisor     Intervention:    CBT: guided discussion on anxiety triggers and reviewed distress tolerance techniques  Motivational Interviewing: active listening, validation, and reassurance     Assessments completed prior to visit:  The following assessments were completed by patient for this visit:  PHQ9:   PHQ-9 SCORE 2/28/2022 4/4/2022   PHQ-9 Total Score 9 0     GAD7:   AMELIA-7 SCORE " 2/24/2022 2/28/2022 4/4/2022   Total Score 17 (severe anxiety) - 8 (mild anxiety)   Total Score 17 10 8         ASSESSMENT: Current Emotional / Mental Status (status of significant symptoms):   Risk status (Self / Other harm or suicidal ideation)   Patient denies current fears or concerns for personal safety.   Patient denies current or recent suicidal ideation or behaviors.   Patient denies current or recent homicidal ideation or behaviors.   Patient denies current or recent self injurious behavior or ideation.   Patient denies other safety concerns.   Patient reports there has been no change in risk factors since their last session.     Patient reports there has been no change in protective factors since their last session.     Recommended that patient call 911 or go to the local ED should there be a change in any of these risk factors.     Appearance:   Appropriate    Eye Contact:   Good    Psychomotor Behavior: Normal    Attitude:   Cooperative    Orientation:   All   Speech    Rate / Production: Normal     Volume:  Normal    Mood:    Normal   Affect:    Appropriate    Thought Content:  Clear    Thought Form:  Coherent  Logical    Insight:    Good      Medication Review:   No changes to current psychiatric medication(s)     Medication Compliance:   Yes     Changes in Health Issues:   None reported     Chemical Use Review:   Substance Use: Chemical use reviewed, no active concerns identified      Tobacco Use: No current tobacco use.      Diagnosis:  1. AMELIA (generalized anxiety disorder) F41.1       Collateral Reports Completed:   Not Applicable    PLAN: (Patient Tasks / Therapist Tasks / Other)  Patient will continue to practice paired muscle relaxation techniques to help with physical symptoms of anxiety  Patient will use distraction (tv, phone games) when feeling her anxiety begin to increase      Dara Mendes [facility], June 8, 2022                                                          ______________________________________________________________________    Individual Treatment Plan     Patient's Name: Albertina Rojas                    YOB: 1985     Date of Creation: 4/13/2022  Date Treatment Plan Last Reviewed/Revised: New 4/13/2022     DSM5 Diagnoses: 300.02 (F41.1) Generalized Anxiety Disorder or Adjustment Disorders  309.28 (F43.23) With mixed anxiety and depressed mood  Psychosocial / Contextual Factors: Co-parenting 3 y.o. 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     Anticipated number of session for this episode of care: 1xweek until July, 2022  Anticipation frequency of session: Weekly  Anticipated Duration of each session: 38-52 minutes  Treatment plan will be reviewed in 90 days or when goals have been changed.         MeasurableTreatment Goal(s) related to diagnosis / functional impairment(s)  Goal 1: Patient will increase awareness of anxiety and their impact on functioning and develop skills to reduce negative impact.    I will know I've met my goal when I can identify anxiety triggers and use coping skills to manage symptoms.       Objective #A (Patient Action)                          Patient will describe thoughts, feelings, and actions associated with anxiety.  Status: New - Date: 4/13/2022      Intervention(s)  Therapist will explore and process with patient how anxiety has impacted them.     Objective #B  Patient will increase distress tolerance coping sklls.  Status: New - Date: 4/13/2022      Intervention(s)  Therapist will teach distraction skills. and will model their use.           Goal 2: Patient will identify anxiety triggers and maladaptive responses to them     Objective #A (Patient Action)                          Status: New - Date: 4/13/2022      Patient will identify situations,  thoughts, and behaviors that trigger anxiety.     Intervention(s)  Therapist will facilitate guided discussion.     Objective #B  Patient will identify thoughts and beliefs about self and the world as they relate to anxiety.                         Status: New - Date: 4/13/2022      Intervention(s)  Therapist will guide discussion and assist patient in identification of negative beliefs.  .              Patient has reviewed and agreed to the above plan.

## 2022-06-10 ENCOUNTER — OFFICE VISIT (OUTPATIENT)
Dept: PODIATRY | Facility: CLINIC | Age: 37
End: 2022-06-10
Attending: FAMILY MEDICINE
Payer: OTHER GOVERNMENT

## 2022-06-10 VITALS
HEIGHT: 66 IN | WEIGHT: 192 LBS | DIASTOLIC BLOOD PRESSURE: 79 MMHG | SYSTOLIC BLOOD PRESSURE: 120 MMHG | HEART RATE: 73 BPM | BODY MASS INDEX: 30.86 KG/M2

## 2022-06-10 DIAGNOSIS — M79.672 LEFT FOOT PAIN: ICD-10-CM

## 2022-06-10 DIAGNOSIS — M72.2 PLANTAR FASCIITIS, LEFT: Primary | ICD-10-CM

## 2022-06-10 PROCEDURE — 99203 OFFICE O/P NEW LOW 30 MIN: CPT | Performed by: PODIATRIST

## 2022-06-10 RX ORDER — MELOXICAM 7.5 MG/1
7.5 TABLET ORAL DAILY
Qty: 30 TABLET | Refills: 1 | Status: SHIPPED | OUTPATIENT
Start: 2022-06-10 | End: 2022-08-05

## 2022-06-10 RX ORDER — METHYLPREDNISOLONE 4 MG
4 TABLET, DOSE PACK ORAL SEE ADMIN INSTRUCTIONS
Qty: 21 TABLET | Refills: 0 | Status: SHIPPED | OUTPATIENT
Start: 2022-06-10 | End: 2022-07-08

## 2022-06-10 ASSESSMENT — PAIN SCALES - GENERAL: PAINLEVEL: MILD PAIN (3)

## 2022-06-10 NOTE — LETTER
"    6/10/2022         RE: Albertina Rojas  [address] [facility] [id number]        Dear Colleague,    Thank you for referring your patient, Albertina Rojas, to the [facility] [place]. Please see a copy of my visit note below.    PATIENT HISTORY:  Albertina Rojas is a 36 year old female who presents to clinic in consultation at the request of  Alvaro Alamo M.D. with a chief complaint of left heel pain.  The patient is seen by themselves.  The patient relates the pain is primarily located around the bottom of the heel.  Reports insidious onset without acute precipitating event. that has been going on for 5 year(s).  The patient has previously tried physical therapy, heel cup, boot, and stretching with little relief.  Prior history of similar pain/issues ~ 6 years ago..  The patient is currently employed as human resources.  Any previous notes and studies that pertain to the patient's condition were reviewed.    Pertinent medical, surgical and family history was reviewed in the Epic chart.    Medications:   Current Outpatient Medications:      citalopram (CELEXA) 20 MG tablet, Take 1 tablet (20 mg) by mouth daily, Disp: 90 tablet, Rfl: 3     meloxicam (MOBIC) 7.5 MG tablet, Take 1 tablet (7.5 mg) by mouth daily, Disp: 30 tablet, Rfl: 1     methylPREDNISolone (MEDROL DOSEPAK) 4 MG tablet therapy pack, Take 1 tablet (4 mg) by mouth See Admin Instructions Tapered dose, Disp: 21 tablet, Rfl: 0     Allergies:    Allergies   Allergen Reactions     Seasonal Allergies Other (See Comments)     Drainage in back of throat, runny nose       Vitals: /79   Pulse 73   Ht 1.664 m (5' 5.5\")   Wt 87.1 kg (192 lb)   LMP  (LMP Unknown)   BMI 31.46 kg/m    BMI= Body mass index is 31.46 kg/m .    LOWER EXTREMITY PHYSICAL EXAM    Dermatologic: Skin is intact to left lower extremity without significant lesions, rash or abrasion.        Vascular: DP & PT pulses are intact & regular on the left.   CFT and " skin temperature is normal to the left lower extremity.     Neurologic: Lower extremity sensation is intact to light touch.  No evidence of weakness in the left lower extremity.        Musculoskeletal: Patient is ambulatory without assistive device or brace.  No gross ankle deformity noted.  No foot or ankle joint effusion is noted.  Noted pain on palpation under the left heel at the insertion point of the plantar fascia.  No surrounding erythema or edema noted.  Noted tight gastroc complex on the left lower extremity.         ASSESSMENT / PLAN:     ICD-10-CM    1. Plantar fasciitis, left  M72.2 meloxicam (MOBIC) 7.5 MG tablet     methylPREDNISolone (MEDROL DOSEPAK) 4 MG tablet therapy pack     Ankle/Foot Bracing Supplies DME   2. Left foot pain  M79.672 Orthopedic  Referral       I have explained to Albertina about the conditions.  We discussed the underlying contributing factors to the condition as well as treatment options along with expected length of recovery.  At this time, the patient was educated on the importance of offloading supportive shoes and other devices.  I demonstrated to the patient calf stretches to perform every hour daily until symptoms resolve.  After symptoms resolve, the patient was advised to perform the stretches 3 times daily to prevent future recurrence.  The patient was instructed to perform warm soaks with Epson salt after which to also apply over-the-counter Voltaren gel to deeply massage the injured tissue.  The patient was instructed to do this on a daily basis until symptoms resolve.   The patient was fitted with a Dynaflex insert that will aid in offloading the tension forces to the soft tissues and prevent further inflammation.  To reduce the amount of current inflammation, the patient was prescribed a Medrol Dosepak followed with Mobic 7.5 mg to be taken daily with food and instructed to stop taking if any stomach irritation or swelling in extremities are noted.  The  patient will return in four weeks for reevaluation and to determine if any further treatment will be needed.      Ablertina verbalized agreement with and understanding of the rational for the diagnosis and treatment plan.  All questions were answered to best of my ability and the patient's satisfaction. The patient was advised to contact the clinic with any questions that may arise after the clinic visit.      Disclaimer: This note consists of symbols derived from keyboarding, dictation and/or voice recognition software. As a result, there may be errors in the script that have gone undetected. Please consider this when interpreting information found in this chart.       RUFINO Marte D.P.M., F.A.C.F.A.S.        Again, thank you for allowing me to participate in the care of your patient.        Sincerely,        Mason Marte DPM

## 2022-06-10 NOTE — NURSING NOTE
"Chief Complaint   Patient presents with     Left Foot - Pain     Consult     2016-bone spur-left heel       Initial /79   Pulse 73   Ht 1.664 m (5' 5.5\")   Wt 87.1 kg (192 lb)   LMP  (LMP Unknown)   BMI 31.46 kg/m   Estimated body mass index is 31.46 kg/m  as calculated from the following:    Height as of this encounter: 1.664 m (5' 5.5\").    Weight as of this encounter: 87.1 kg (192 lb).  Medications and allergies reviewed.      Jerica GARNER MA    "

## 2022-06-10 NOTE — PATIENT INSTRUCTIONS

## 2022-06-10 NOTE — PROGRESS NOTES
"PATIENT HISTORY:  Albertina Rojas is a 36 year old female who presents to clinic in consultation at the request of  Alvaro Alamo M.D. with a chief complaint of left heel pain.  The patient is seen by themselves.  The patient relates the pain is primarily located around the bottom of the heel.  Reports insidious onset without acute precipitating event. that has been going on for 5 year(s).  The patient has previously tried physical therapy, heel cup, boot, and stretching with little relief.  Prior history of similar pain/issues ~ 6 years ago..  The patient is currently employed as human resources.  Any previous notes and studies that pertain to the patient's condition were reviewed.    Pertinent medical, surgical and family history was reviewed in the Epic chart.    Medications:   Current Outpatient Medications:      citalopram (CELEXA) 20 MG tablet, Take 1 tablet (20 mg) by mouth daily, Disp: 90 tablet, Rfl: 3     meloxicam (MOBIC) 7.5 MG tablet, Take 1 tablet (7.5 mg) by mouth daily, Disp: 30 tablet, Rfl: 1     methylPREDNISolone (MEDROL DOSEPAK) 4 MG tablet therapy pack, Take 1 tablet (4 mg) by mouth See Admin Instructions Tapered dose, Disp: 21 tablet, Rfl: 0     Allergies:    Allergies   Allergen Reactions     Seasonal Allergies Other (See Comments)     Drainage in back of throat, runny nose       Vitals: /79   Pulse 73   Ht 1.664 m (5' 5.5\")   Wt 87.1 kg (192 lb)   LMP  (LMP Unknown)   BMI 31.46 kg/m    BMI= Body mass index is 31.46 kg/m .    LOWER EXTREMITY PHYSICAL EXAM    Dermatologic: Skin is intact to left lower extremity without significant lesions, rash or abrasion.        Vascular: DP & PT pulses are intact & regular on the left.   CFT and skin temperature is normal to the left lower extremity.     Neurologic: Lower extremity sensation is intact to light touch.  No evidence of weakness in the left lower extremity.        Musculoskeletal: Patient is ambulatory without assistive device or " brace.  No gross ankle deformity noted.  No foot or ankle joint effusion is noted.  Noted pain on palpation under the left heel at the insertion point of the plantar fascia.  No surrounding erythema or edema noted.  Noted tight gastroc complex on the left lower extremity.         ASSESSMENT / PLAN:     ICD-10-CM    1. Plantar fasciitis, left  M72.2 meloxicam (MOBIC) 7.5 MG tablet     methylPREDNISolone (MEDROL DOSEPAK) 4 MG tablet therapy pack     Ankle/Foot Bracing Supplies DME   2. Left foot pain  M79.672 Orthopedic  Referral       I have explained to Albertina about the conditions.  We discussed the underlying contributing factors to the condition as well as treatment options along with expected length of recovery.  At this time, the patient was educated on the importance of offloading supportive shoes and other devices.  I demonstrated to the patient calf stretches to perform every hour daily until symptoms resolve.  After symptoms resolve, the patient was advised to perform the stretches 3 times daily to prevent future recurrence.  The patient was instructed to perform warm soaks with Epson salt after which to also apply over-the-counter Voltaren gel to deeply massage the injured tissue.  The patient was instructed to do this on a daily basis until symptoms resolve.   The patient was fitted with a Dynaflex insert that will aid in offloading the tension forces to the soft tissues and prevent further inflammation.  To reduce the amount of current inflammation, the patient was prescribed a Medrol Dosepak followed with Mobic 7.5 mg to be taken daily with food and instructed to stop taking if any stomach irritation or swelling in extremities are noted.  The patient will return in four weeks for reevaluation and to determine if any further treatment will be needed.      Albertina verbalized agreement with and understanding of the rational for the diagnosis and treatment plan.  All questions were answered to best  of my ability and the patient's satisfaction. The patient was advised to contact the clinic with any questions that may arise after the clinic visit.      Disclaimer: This note consists of symbols derived from keyboarding, dictation and/or voice recognition software. As a result, there may be errors in the script that have gone undetected. Please consider this when interpreting information found in this chart.       RUFINO Marte D.P.M., [email or web address].

## 2022-06-14 ENCOUNTER — VIRTUAL VISIT (OUTPATIENT)
Dept: BEHAVIORAL HEALTH | Facility: CLINIC | Age: 37
End: 2022-06-14
Attending: PSYCHIATRY & NEUROLOGY
Payer: OTHER GOVERNMENT

## 2022-06-14 DIAGNOSIS — F41.1 GAD (GENERALIZED ANXIETY DISORDER): Primary | ICD-10-CM

## 2022-06-14 NOTE — PROGRESS NOTES
[facility]                                    Progress Note    Patient Name: Albertina Rojas  Date: 6/14/2022         Service Type: Individual      Session Start Time: 1430  Session End Time: 1545     Session Length: 75 Minutes    Session #: 11    Attendees: Client attended alone    Service Modality:  Video Visit:      Provider verified identity through the following two step process.  Patient provided:  Patient is known previously to provider    Telemedicine Visit: The patient's condition can be safely assessed and treated via synchronous audio and visual telemedicine encounter.      Reason for Telemedicine Visit: Services only offered telehealth    Originating Site (Patient Location): Patient's home    Distant Site (Provider Location): [facility] MENTAL HEALTH & ADDICTION SERVICES    Consent:  The patient/guardian has verbally consented to: the potential risks and benefits of telemedicine (video visit) versus in person care; bill my insurance or make self-payment for services provided; and responsibility for payment of non-covered services.     Patient would like the video invitation sent by:  Text to cell phone: [phone]    Mode of Communication:  Video Conference via Amwell    As the provider I attest to compliance with applicable laws and regulations related to telemedicine.    DATA  Interactive Complexity: No  Crisis: No        Progress Since Last Session (Related to Symptoms / Goals / Homework):   Symptoms: Worsening increased symptoms of anxiety due to ongoing stress at work  Irritable, low motivation to do much at home. Sleep difficulties. Low appetite but eats because she knows she needs to.    Homework: Achieved / completed to satisfaction      Episode of Care Goals: Satisfactory progress - MAINTENANCE (Working to maintain change, with risk of relapse); Intervened by continuing to positively reinforce healthy behavior choice      Current / Ongoing  "Stressors and Concerns:   Pt referred to the Transition Clinic by her primary care physician at [facility] on 3/1/2022. Per chart review, Albertina is new to [facility]. She was previously living in [facility] and recently moved to [place]. She is here today to discuss anxiety. She feels that she has had anxiety for years. She has been able to stay busy with her schedule and will typically have good and bad days. She often has thought about making an appointment on the bad days, but then the good days seem to outweigh and she does not schedule. Patient was referred to the [facility] for long-term therapy and is scheduled for 7/25/22. This provider sent the patient several possible long-term providers that accept her  insurance of [facility]. The patient reports that she reached out to one of the providers and discovered that there would be several steps to take in order for her to be able to start with this provider. The patient reports that even discussing the \"red tape\" of working with this provider caused her anxiety to \"skyrocket\". The patient states that she would like to continue to see this provider until she starts with the new provider at [facility] in July.     Today, June 14, 2022, the patient endorses increased symptoms of anxiety including increased irritability, low frustration tolerance and is easily agitated. The increase in symptoms are largely contributed to ongoing work stress. The patient reports that her workload increases ongoing due to staff leaving or transferring to a different role but these positions are not filled with new employees. Therefore, the patients workload continues to increase without any staff to assist her. The patient states that there is one other person that is supposed to be helping her but this person is \"on his way out\". The patient has reported her concerns with this employer to her supervisor but nothing is being done. The patient states that her boss " "continues to ask her to take on new tasks even though she has told him that she can't take on new tasks at this time due to her increased workload without having any help. Patient is emotional when she speaks about this because she states, \"I'm at a loss in saying things anymore because nothing has changed so why would I think anything would happen that would help me with the daily workload. I feel like people just roll their eyes at me when I talk because I say the same thing over and over.\"  Doesn't feel hopeful that she will be getting any help soon. Work stress has been impacting things at home including being more irritable towards her partner and not having motivation to do much of anything. Pt states she is hoping it has \"just been a bad few days\" and that things will improve as the week progresses.     Pt expressed comprehension and acceptance of informed consent and the nature of / limitations to confidentiality due to mandated reporting when reviewed in session.     Treatment Objective(s) Addressed in This Session:   identify three distraction and diversion activities and use those activities to decrease level of anxiety    prioritize daily tasks according to most emergent and identify tools to communicate needs to supervisor  Practice boundary setting with work tasks     Intervention:    CBT: guided discussion on anxiety triggers and reviewed distress tolerance techniques  Motivational Interviewing: active listening, validation, and reassurance     Assessments completed prior to visit:  The following assessments were completed by patient for this visit:  PHQ9:   PHQ-9 SCORE 2/28/2022 4/4/2022   PHQ-9 Total Score 9 0     GAD7:   AMELIA-7 SCORE 2/24/2022 2/28/2022 4/4/2022   Total Score 17 (severe anxiety) - 8 (mild anxiety)   Total Score 17 10 8     [place] Suicide Severity Rating Scale (Lifetime/Recent)No flowsheet data found.      ASSESSMENT: Current Emotional / Mental Status (status of significant " symptoms):   Risk status (Self / Other harm or suicidal ideation)   Patient denies current fears or concerns for personal safety.   Patient denies current or recent suicidal ideation or behaviors.   Patient denies current or recent homicidal ideation or behaviors.   Patient denies current or recent self injurious behavior or ideation.   Patient denies other safety concerns.   Patient reports there has been no change in risk factors since their last session.     Patient reports there has been no change in protective factors since their last session.     Recommended that patient call 911 or go to the local ED should there be a change in any of these risk factors.     Appearance:   Appropriate    Eye Contact:   Good    Psychomotor Behavior: Normal    Attitude:   Cooperative  frustrated    Orientation:   All   Speech    Rate / Production: Normal     Volume:  Normal    Mood:    Irritable  Normal frustrated and angry about situation at work   Affect:    Appropriate    Thought Content:  Clear    Thought Form:  Coherent  Logical    Insight:    Good      Medication Review:   No changes to current psychiatric medication(s)     Medication Compliance:   Yes     Changes in Health Issues:   None reported     Chemical Use Review:   Substance Use: Chemical use reviewed, no active concerns identified      Tobacco Use: No current tobacco use.      Diagnosis:  1. AMELIA (generalized anxiety disorder) F41.1       Collateral Reports Completed:   Not Applicable    PLAN: (Patient Tasks / Therapist Tasks / Other)  Patient will continue to practice paired muscle relaxation techniques to help with physical symptoms of anxiety  Patient will use distraction (tv, phone games) when feeling her anxiety begin to increase  Patient will continue to practice boundary setting at work         Dara Mendes [facility], June 14, 2022                                                          ______________________________________________________________________    Individual Treatment Plan     Patient's Name: Albertina Rojas                    YOB: 1985     Date of Creation: 4/13/2022  Date Treatment Plan Last Reviewed/Revised: New 4/13/2022     DSM5 Diagnoses: 300.02 (F41.1) Generalized Anxiety Disorder or Adjustment Disorders  309.28 (F43.23) With mixed anxiety and depressed mood  Psychosocial / Contextual Factors: Co-parenting 5 y.o. 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     Anticipated number of session for this episode of care: 1xweek until July, 2022  Anticipation frequency of session: Weekly  Anticipated Duration of each session: 38-52 minutes  Treatment plan will be reviewed in 90 days or when goals have been changed.         MeasurableTreatment Goal(s) related to diagnosis / functional impairment(s)  Goal 1: Patient will increase awareness of anxiety and their impact on functioning and develop skills to reduce negative impact.    I will know I've met my goal when I can identify anxiety triggers and use coping skills to manage symptoms.       Objective #A (Patient Action)                          Patient will describe thoughts, feelings, and actions associated with anxiety.  Status: New - Date: 4/13/2022      Intervention(s)  Therapist will explore and process with patient how anxiety has impacted them.     Objective #B  Patient will increase distress tolerance coping sklls.  Status: New - Date: 4/13/2022      Intervention(s)  Therapist will teach distraction skills. and will model their use.           Goal 2: Patient will identify anxiety triggers and maladaptive responses to them     Objective #A (Patient Action)                          Status: New - Date: 4/13/2022      Patient will identify situations,  thoughts, and behaviors that trigger anxiety.     Intervention(s)  Therapist will facilitate guided discussion.     Objective #B  Patient will identify thoughts and beliefs about self and the world as they relate to anxiety.                         Status: New - Date: 4/13/2022      Intervention(s)  Therapist will guide discussion and assist patient in identification of negative beliefs.  .

## 2022-06-22 ENCOUNTER — TELEPHONE (OUTPATIENT)
Dept: BEHAVIORAL HEALTH | Facility: CLINIC | Age: 37
End: 2022-06-22

## 2022-06-22 NOTE — TELEPHONE ENCOUNTER
Writer spoke with patient and moved appointment from today to tomorrow 06/22/22 @ 2pm.    Jessica Lay  06/22/22  127

## 2022-06-23 ENCOUNTER — VIRTUAL VISIT (OUTPATIENT)
Dept: BEHAVIORAL HEALTH | Facility: CLINIC | Age: 37
End: 2022-06-23
Attending: PSYCHIATRY & NEUROLOGY
Payer: OTHER GOVERNMENT

## 2022-06-23 DIAGNOSIS — F41.1 GAD (GENERALIZED ANXIETY DISORDER): Primary | ICD-10-CM

## 2022-06-23 NOTE — PROGRESS NOTES
[facility]                                    Progress Note    Patient Name: Albertina Rojas  Date: 6/23/2022         Service Type: Individual      Session Start Time: 1400  Session End Time: 1452     Session Length: 52 MInutes    Session #: 12    Attendees: Client attended alone    Service Modality:  Video Visit:      Provider verified identity through the following two step process.  Patient provided:  Patient is known previously to provider    Telemedicine Visit: The patient's condition can be safely assessed and treated via synchronous audio and visual telemedicine encounter.      Reason for Telemedicine Visit: Services only offered telehealth    Originating Site (Patient Location): Patient's home    Distant Site (Provider Location): [facility] MENTAL HEALTH & ADDICTION SERVICES    Consent:  The patient/guardian has verbally consented to: the potential risks and benefits of telemedicine (video visit) versus in person care; bill my insurance or make self-payment for services provided; and responsibility for payment of non-covered services.     Patient would like the video invitation sent by:  Text to cell phone: [phone]    Mode of Communication:  Video Conference via Amwell    As the provider I attest to compliance with applicable laws and regulations related to telemedicine.    DATA  Interactive Complexity: No  Crisis: No        Progress Since Last Session (Related to Symptoms / Goals / Homework):   Symptoms: Improving Pt reports improvement in symptoms since the last session.    Homework: Achieved / completed to satisfaction      Episode of Care Goals: Satisfactory progress - MAINTENANCE (Working to maintain change, with risk of relapse); Intervened by continuing to positively reinforce healthy behavior choice      Current / Ongoing Stressors and Concerns:      Pt referred to the Transition Clinic by her primary care physician at [facility] on  "3/1/2022. Per chart review, Albertina is new to [facility]. She was previously living in [facility] and recently moved to [place]. She is here today to discuss anxiety. She feels that she has had anxiety for years. She has been able to stay busy with her schedule and will typically have good and bad days. She often has thought about making an appointment on the bad days, but then the good days seem to outweigh and she does not schedule. Patient was referred to the [facility] for long-term therapy and is scheduled for 7/25/22. This provider sent the patient several possible long-term providers that accept her  insurance of [employer]. The patient reports that she reached out to one of the providers and discovered that there would be several steps to take in order for her to be able to start with this provider. The patient reports that even discussing the \"red tape\" of working with this provider caused her anxiety to \"skyrocket\". The patient states that she would like to continue to see this provider until she starts with the new provider at [facility] in July.     Today, 6/23/2022, the patient states that she has had a better week this week and attributes this to her working from home 2 days this week and having the day off on Monday. The patient does endorse continued work stressors (day to day) but that she did speak with her supervisor about her ongoing workload and he brought in someone to help with some of the special projects which has been helpful. Pt states she continues to have \"episodes\" where she becomes overwhelmed and will cry \"at the drop of a hat\".     Pt expressed comprehension and acceptance of informed consent and the nature of / limitations to confidentiality due to mandated reporting when reviewed in session.     Treatment Objective(s) Addressed in This Session:   identify three distraction and diversion activities and use those activities to decrease level of anxiety    Practice boundary setting " with work tasks     Intervention:   CBT: guided discussion on anxiety triggers and reviewed distress tolerance techniques  Motivational Interviewing: active listening, validation, and reassurance    Assessments completed prior to visit:  The following assessments were completed by patient for this visit:  PHQ9:   PHQ-9 SCORE 2/28/2022 4/4/2022   PHQ-9 Total Score 9 0     GAD7:   AMELIA-7 SCORE 2/24/2022 2/28/2022 4/4/2022   Total Score 17 (severe anxiety) - 8 (mild anxiety)   Total Score 17 10 8         ASSESSMENT: Current Emotional / Mental Status (status of significant symptoms):   Risk status (Self / Other harm or suicidal ideation)   Patient denies current fears or concerns for personal safety.   Patient denies current or recent suicidal ideation or behaviors.   Patient denies current or recent homicidal ideation or behaviors.   Patient denies current or recent self injurious behavior or ideation.   Patient denies other safety concerns.   Patient reports there has been no change in risk factors since their last session.     Patient reports there has been no change in protective factors since their last session.     Recommended that patient call 911 or go to the local ED should there be a change in any of these risk factors.     Appearance:   Appropriate    Eye Contact:   Good    Psychomotor Behavior: Normal    Attitude:   Cooperative    Orientation:   All   Speech    Rate / Production: Normal     Volume:  Normal    Mood:    Normal   Affect:    Appropriate    Thought Content:  Clear    Thought Form:  Coherent  Logical    Insight:    Good      Medication Review:   No changes to current psychiatric medication(s)     Medication Compliance:   Yes     Changes in Health Issues:   None reported     Chemical Use Review:   Substance Use: Chemical use reviewed, no active concerns identified      Tobacco Use: No current tobacco use.      Diagnosis:  1. AMELIA (generalized anxiety disorder) F41.1        Collateral Reports  Completed:   Not Applicable    PLAN: (Patient Tasks / Therapist Tasks / Other)  Patient will continue to practice paired muscle relaxation techniques to help with physical symptoms of anxiety  Patient will use distraction (tv, phone games) when feeling her anxiety begin to increase  Patient will continue to practice boundary setting at work   Patient will set aside time for herself over the weekend; will read a book for 30-45 minutes each night    Dara Mendes, [facility], June 23, 2022                                                         ______________________________________________________________________    Individual Treatment Plan     Patient's Name: Albertina Rojas                    YOB: 1985     Date of Creation: 4/13/2022  Date Treatment Plan Last Reviewed/Revised: New 4/13/2022     DSM5 Diagnoses: 300.02 (F41.1) Generalized Anxiety Disorder or Adjustment Disorders  309.28 (F43.23) With mixed anxiety and depressed mood  Psychosocial / Contextual Factors: Co-parenting 5 y.o. 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     Anticipated number of session for this episode of care: 1xweek until July, 2022  Anticipation frequency of session: Weekly  Anticipated Duration of each session: 38-52 minutes  Treatment plan will be reviewed in 90 days or when goals have been changed.         MeasurableTreatment Goal(s) related to diagnosis / functional impairment(s)  Goal 1: Patient will increase awareness of anxiety and their impact on functioning and develop skills to reduce negative impact.    I will know I've met my goal when I can identify anxiety triggers and use coping skills to manage symptoms.       Objective #A (Patient Action)                          Patient will describe thoughts, feelings, and actions associated with  anxiety.  Status: New - Date: 4/13/2022      Intervention(s)  Therapist will explore and process with patient how anxiety has impacted them.     Objective #B  Patient will increase distress tolerance coping sklls.  Status: New - Date: 4/13/2022      Intervention(s)  Therapist will teach distraction skills. and will model their use.           Goal 2: Patient will identify anxiety triggers and maladaptive responses to them     Objective #A (Patient Action)                          Status: New - Date: 4/13/2022      Patient will identify situations, thoughts, and behaviors that trigger anxiety.     Intervention(s)  Therapist will facilitate guided discussion.     Objective #B  Patient will identify thoughts and beliefs about self and the world as they relate to anxiety.                         Status: New - Date: 4/13/2022      Intervention(s)  Therapist will guide discussion and assist patient in identification of negative beliefs.  .

## 2022-06-29 ENCOUNTER — VIRTUAL VISIT (OUTPATIENT)
Dept: BEHAVIORAL HEALTH | Facility: CLINIC | Age: 37
End: 2022-06-29
Attending: PSYCHIATRY & NEUROLOGY
Payer: OTHER GOVERNMENT

## 2022-06-29 DIAGNOSIS — F41.1 GAD (GENERALIZED ANXIETY DISORDER): Primary | ICD-10-CM

## 2022-06-29 NOTE — PROGRESS NOTES
"      [facility]                                    Progress Note    Patient Name: Albertina Rojas  Date: 6/29/2022         Service Type: Individual      Session Start Time: 1400  Session End Time: 1439     Session Length: 39 Minute    Session #: 13    Attendees: Client attended alone    Service Modality:  Phone Visit:      Provider verified identity through the following two step process.  Patient provided:  Patient is known previously to provider    The patient has been notified of the following:      \"We have found that certain health care needs can be provided without the need for a face to face visit.  This service lets us provide the care you need with a phone conversation.       I will have full access to your [facility] medical record during this entire phone call.   I will be taking notes for your medical record.      Since this is like an office visit, we will bill your insurance company for this service.       There are potential benefits and risks of telephone visits (e.g. limits to patient confidentiality) that differ from in-person visits.?Confidentiality still applies for telephone services, and nobody will record the visit.  It is important to be in a quiet, private space that is free of distractions (including cell phone or other devices) during the visit.??      If during the course of the call I believe a telephone visit is not appropriate, you will not be charged for this service\"     Consent has been obtained for this service by care team member: Yes     DATA  Interactive Complexity: No  Crisis: No        Progress Since Last Session (Related to Symptoms / Goals / Homework):   Symptoms: No change No significant change in overall symptoms; continues to endorse sleep difficulties and ongoing baseilne anxiety    Homework: n/a      Episode of Care Goals: Satisfactory progress - MAINTENANCE (Working to maintain change, with risk of relapse); Intervened by continuing to " "positively reinforce healthy behavior choice      Current / Ongoing Stressors and Concerns:     Pt referred to the Transition Clinic by her primary care physician at [facility] on 3/1/2022. Per chart review, Albertina is new to [facility]. She was previously living in [facility] and recently moved to [place]. She is here today to discuss anxiety. She feels that she has had anxiety for years. She has been able to stay busy with her schedule and will typically have good and bad days. She often has thought about making an appointment on the bad days, but then the good days seem to outweigh and she does not schedule. Patient was referred to the [facility] for long-term therapy and is scheduled for 7/25/22. This provider sent the patient several possible long-term providers that accept her  insurance of [employer]. The patient reports that she reached out to one of the providers and discovered that there would be several steps to take in order for her to be able to start with this provider. The patient reports that even discussing the \"red tape\" of working with this provider caused her anxiety to \"skyrocket\". The patient states that she would like to continue to see this provider until she starts with the new provider at [facility].     Today, 6/29/2022, the patient reports no significant change in overall symptoms. The patient reports continued baseline anxiety, mostly at work. Pt expresses frustration about all of the communication issues at work and also about the non-stop changes with the reporting systems. The patient does report that she made an effort this past weekend for some self-care and reports it was nice to be able to relax.     Pt reports that overall, things with her partner are good but that she gets frustrated that he struggles with communication.     Pt expressed comprehension and acceptance of informed consent and the nature of / limitations to confidentiality due to mandated reporting when " reviewed in session.         Treatment Objective(s) Addressed in This Session:   use cognitive strategies identified in therapy to challenge anxious thoughts  Continue to practice boundary setting with work tasks     Intervention:    CBT: guided discussion on anxiety triggers and reviewed distress tolerance techniques  Motivational Interviewing: active listening, validation, and reassurance    Assessments completed prior to visit:  The following assessments were completed by patient for this visit:  n/a      ASSESSMENT: Current Emotional / Mental Status (status of significant symptoms):   Risk status (Self / Other harm or suicidal ideation)   Patient denies current fears or concerns for personal safety.   Patient denies current or recent suicidal ideation or behaviors.   Patient denies current or recent homicidal ideation or behaviors.   Patient denies current or recent self injurious behavior or ideation.   Patient denies other safety concerns.   Patient reports there has been no change in risk factors since their last session.     Patient reports there has been no change in protective factors since their last session.     Recommended that patient call 911 or go to the local ED should there be a change in any of these risk factors.     Appearance:   Unable to assess; phone session    Eye Contact:   Unable to assess; phone session    Psychomotor Behavior: Unable to assess; phone session    Attitude:   Cooperative    Orientation:   All   Speech    Rate / Production: Normal     Volume:  Normal    Mood:    Irritable    Affect:    Unable to assess; phone session    Thought Content:  Clear    Thought Form:  Coherent  Logical    Insight:    Good      Medication Review:   No changes to current psychiatric medication(s)     Medication Compliance:   Yes     Changes in Health Issues:   None reported     Chemical Use Review:   Substance Use: Chemical use reviewed, no active concerns identified      Tobacco Use: No current  tobacco use.      Diagnosis:  1. AMELIA (generalized anxiety disorder) F41.1       Collateral Reports Completed:   Not Applicable    PLAN: (Patient Tasks / Therapist Tasks / Other)    Patient will continue to practice paired muscle relaxation techniques to help with physical symptoms of anxiety  Patient will use distraction (tv, phone games) when feeling her anxiety begin to increase  Patient will continue to practice boundary setting at work       Dara Vaughn, [facility], June 29, 2022                                                         ______________________________________________________________________    Individual Treatment Plan     Patient's Name: Albertina Rojas                    YOB: 1985     Date of Creation: 4/13/2022  Date Treatment Plan Last Reviewed/Revised: New 4/13/2022     DSM5 Diagnoses: 300.02 (F41.1) Generalized Anxiety Disorder or Adjustment Disorders  309.28 (F43.23) With mixed anxiety and depressed mood  Psychosocial / Contextual Factors: Co-parenting 3 y.o. 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     Anticipated number of session for this episode of care: 1xweek until July, 2022  Anticipation frequency of session: Weekly  Anticipated Duration of each session: 38-52 minutes  Treatment plan will be reviewed in 90 days or when goals have been changed.         MeasurableTreatment Goal(s) related to diagnosis / functional impairment(s)  Goal 1: Patient will increase awareness of anxiety and their impact on functioning and develop skills to reduce negative impact.    I will know I've met my goal when I can identify anxiety triggers and use coping skills to manage symptoms.       Objective #A (Patient Action)                          Patient will describe thoughts, feelings, and actions associated with  anxiety.  Status: New - Date: 4/13/2022      Intervention(s)  Therapist will explore and process with patient how anxiety has impacted them.     Objective #B  Patient will increase distress tolerance coping sklls.  Status: New - Date: 4/13/2022      Intervention(s)  Therapist will teach distraction skills. and will model their use.           Goal 2: Patient will identify anxiety triggers and maladaptive responses to them     Objective #A (Patient Action)                          Status: New - Date: 4/13/2022      Patient will identify situations, thoughts, and behaviors that trigger anxiety.     Intervention(s)  Therapist will facilitate guided discussion.     Objective #B  Patient will identify thoughts and beliefs about self and the world as they relate to anxiety.                         Status: New - Date: 4/13/2022      Intervention(s)  Therapist will guide discussion and assist patient in identification of negative beliefs.  .

## 2022-07-06 ENCOUNTER — VIRTUAL VISIT (OUTPATIENT)
Dept: BEHAVIORAL HEALTH | Facility: CLINIC | Age: 37
End: 2022-07-06
Attending: PSYCHIATRY & NEUROLOGY
Payer: OTHER GOVERNMENT

## 2022-07-06 DIAGNOSIS — F41.1 GAD (GENERALIZED ANXIETY DISORDER): Primary | ICD-10-CM

## 2022-07-06 NOTE — PROGRESS NOTES
"      [facility]                                    Progress Note    Patient Name: Albertina Rojas  Date: 7/6/2022         Service Type: Individual      Session Start Time: 1400  Session End Time: 1446     Session Length: 46 Minutes    Session #: 14    Attendees: Client attended alone    Service Modality:  Phone Visit:      Provider verified identity through the following two step process.  Patient provided:  Patient is known previously to provider    The patient has been notified of the following:      \"We have found that certain health care needs can be provided without the need for a face to face visit.  This service lets us provide the care you need with a phone conversation.       I will have full access to your [facility] medical record during this entire phone call.   I will be taking notes for your medical record.      Since this is like an office visit, we will bill your insurance company for this service.       There are potential benefits and risks of telephone visits (e.g. limits to patient confidentiality) that differ from in-person visits.?Confidentiality still applies for telephone services, and nobody will record the visit.  It is important to be in a quiet, private space that is free of distractions (including cell phone or other devices) during the visit.??      If during the course of the call I believe a telephone visit is not appropriate, you will not be charged for this service\"     Consent has been obtained for this service by care team member: Yes     DATA  Interactive Complexity: No  Crisis: No        Progress Since Last Session (Related to Symptoms / Goals / Homework):   Symptoms: No change no significant change in overall symptoms. Some struggles with co-parenting and being consistent with son; does endorse difficulties with waking up in the mornings.     Homework: n/a      Episode of Care Goals: Satisfactory progress - MAINTENANCE (Working to maintain " "change, with risk of relapse); Intervened by continuing to positively reinforce healthy behavior choice      Current / Ongoing Stressors and Concerns:   Pt referred to the Transition Clinic by her primary care physician at [facility] on 3/1/2022. Per chart review, Albertina is new to [facility]. She was previously living in [facility] and recently moved to [place]. She is here today to discuss anxiety. She feels that she has had anxiety for years. She has been able to stay busy with her schedule and will typically have good and bad days. She often has thought about making an appointment on the bad days, but then the good days seem to outweigh and she does not schedule. Patient was referred to the [facility] for long-term therapy and is scheduled for 7/25/22. This provider sent the patient several possible long-term providers that accept her  insurance [facility] [place]. The patient reports that she reached out to one of the providers and discovered that there would be several steps to take in order for her to be able to start with this provider. The patient reports that even discussing the \"red tape\" of working with this provider caused her anxiety to \"skyrocket\". The patient states that she would like to continue to see this provider until she starts with the new provider at [facility].     Today, 7/6/2022, the patient reports that she has had a less stressful week due to her working from home all week. Has been more productive from home. Pt states that she continues to struggle with co-parenting with her youngest son and states that the inconsistency is evident when her son returns from his fathers. Continues to struggle with sleep and states she feels \"exhausted\" when she wakes up in the mornings and has no motivation to get out of bed. Has been more irritable and short-tempered.     Pt expressed comprehension and acceptance of informed consent and the nature of / limitations to confidentiality due to " mandated reporting when reviewed in session.     Treatment Objective(s) Addressed in This Session:   use cognitive strategies identified in therapy to challenge anxious thoughts  Continue to practice boundary setting with work tasks      Intervention:   CBT: guided discussion on anxiety triggers and reviewed distress tolerance techniques  Motivational Interviewing: active listening, validation, and reassurance    ASSESSMENT: Current Emotional / Mental Status (status of significant symptoms):   Risk status (Self / Other harm or suicidal ideation)   Patient denies current fears or concerns for personal safety.   Patient denies current or recent suicidal ideation or behaviors.   Patient denies current or recent homicidal ideation or behaviors.   Patient denies current or recent self injurious behavior or ideation.   Patient denies other safety concerns.   Patient reports there has been no change in risk factors since their last session.     Patient reports there has been no change in protective factors since their last session.     Recommended that patient call 911 or go to the local ED should there be a change in any of these risk factors.     Appearance:   Unable to assess; phone session    Eye Contact:   Unable to assess; phone session    Psychomotor Behavior: Unable to assess; phone session    Attitude:   Cooperative  Pleasant   Orientation:   All   Speech    Rate / Production: Normal     Volume:  Normal    Mood:    Normal   Affect:    Unable to assess; phone session    Thought Content:  Clear    Thought Form:  Coherent  Logical    Insight:    Good      Medication Review:   No changes to current psychiatric medication(s)     Medication Compliance:   Yes     Changes in Health Issues:   None reported     Chemical Use Review:   Substance Use: Chemical use reviewed, no active concerns identified      Tobacco Use: No current tobacco use.      Diagnosis:  1. AMELIA (generalized anxiety disorder) F41.1       Collateral  Reports Completed:   Not Applicable    PLAN: (Patient Tasks / Therapist Tasks / Other)  Patient will continue to practice paired muscle relaxation techniques to help with physical symptoms of anxiety  Patient will use distraction (tv, phone games) when feeling her anxiety begin to increase  Patient will continue to practice boundary setting at work   Patient will talk with her supervisor about working remotely more       Dara Mendes [facility], July 6, 2022                                                         ______________________________________________________________________  Individual Treatment Plan     Patient's Name: Albertina Rojas                    YOB: 1985     Date of Creation: 4/13/2022  Date Treatment Plan Last Reviewed/Revised: New 4/13/2022     DSM5 Diagnoses: 300.02 (F41.1) Generalized Anxiety Disorder or Adjustment Disorders  309.28 (F43.23) With mixed anxiety and depressed mood  Psychosocial / Contextual Factors: Co-parenting 5 y.o. 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     Anticipated number of session for this episode of care: 1xweek until July, 2022  Anticipation frequency of session: Weekly  Anticipated Duration of each session: 38-52 minutes  Treatment plan will be reviewed in 90 days or when goals have been changed.         MeasurableTreatment Goal(s) related to diagnosis / functional impairment(s)  Goal 1: Patient will increase awareness of anxiety and their impact on functioning and develop skills to reduce negative impact.    I will know I've met my goal when I can identify anxiety triggers and use coping skills to manage symptoms.       Objective #A (Patient Action)                          Patient will describe thoughts, feelings, and actions associated with anxiety.  Status: New -  Date: 4/13/2022      Intervention(s)  Therapist will explore and process with patient how anxiety has impacted them.     Objective #B  Patient will increase distress tolerance coping sklls.  Status: New - Date: 4/13/2022      Intervention(s)  Therapist will teach distraction skills. and will model their use.           Goal 2: Patient will identify anxiety triggers and maladaptive responses to them     Objective #A (Patient Action)                          Status: New - Date: 4/13/2022      Patient will identify situations, thoughts, and behaviors that trigger anxiety.     Intervention(s)  Therapist will facilitate guided discussion.     Objective #B  Patient will identify thoughts and beliefs about self and the world as they relate to anxiety.                         Status: New - Date: 4/13/2022      Intervention(s)  Therapist will guide discussion and assist patient in identification of negative beliefs.  .

## 2022-07-08 ENCOUNTER — OFFICE VISIT (OUTPATIENT)
Dept: PODIATRY | Facility: CLINIC | Age: 37
End: 2022-07-08
Payer: OTHER GOVERNMENT

## 2022-07-08 VITALS
SYSTOLIC BLOOD PRESSURE: 100 MMHG | WEIGHT: 192 LBS | BODY MASS INDEX: 30.86 KG/M2 | HEIGHT: 66 IN | DIASTOLIC BLOOD PRESSURE: 63 MMHG

## 2022-07-08 DIAGNOSIS — M72.2 PLANTAR FASCIITIS, LEFT: Primary | ICD-10-CM

## 2022-07-08 PROCEDURE — 20550 NJX 1 TENDON SHEATH/LIGAMENT: CPT | Mod: LT | Performed by: PODIATRIST

## 2022-07-08 PROCEDURE — 99213 OFFICE O/P EST LOW 20 MIN: CPT | Mod: 25 | Performed by: PODIATRIST

## 2022-07-08 ASSESSMENT — PAIN SCALES - GENERAL: PAINLEVEL: MILD PAIN (2)

## 2022-07-08 NOTE — PROGRESS NOTES
"Albertina returns to the office for reevaluation of the left foot.  The patient relates following the instructions given at the last visit with noted overall less pain and equivical improvement in function of the left foot.   Pain continues if she is on her feet more than 20 min, but does feel the pain is less than it was previous.   Did have relief with the use of the oral steroid.  Has noticed some improvement with the use of the Mobic   The patient relates no other problems.    Vitals: /63   Ht 1.664 m (5' 5.5\")   Wt 87.1 kg (192 lb)   LMP  (LMP Unknown)   BMI 31.46 kg/m    BMI= Body mass index is 31.46 kg/m .    Lower Extremity Physical Exam:      Neurovascular status remains unchanged.  Muscular exam is within normal limits to major muscle groups.  Integument is intact.      Noted pain on palpation on the plantar aspect of the left heel near the insertion point of the plantar fascia.  No surrounding erythema or edema noted.         Assessment:     ICD-10-CM    1. Plantar fasciitis, left  M72.2        Plan:    I have explained to Albertina about the progress of the conditions.  At this time, the patient would like to proceed with a steroid injection to the left heel.  The medial aspect of the left heel was swabbed with alcohol.  Next, a mixture of 10 mg of Kenalog 10, and 1 cc of 1% lidocaine plain was injected around the medial tubercle of the calcaneal in and around the insertion of the plantar fascia to the left foot.  The patient tolerated the procedure well.  A Band-Aid was applied to the injection site.  The patient will follow up in four weeks.    There is low risk of morbidity with the procedure.  There was no overlap in work associated with the evaluation/management and the work associated with the procedure.    Albertina verbalized agreement with and understanding of the rational for the diagnosis and treatment plan.  All questions were answered to best of my ability and the patient's satisfaction. " The patient was advised to contact the clinic with any questions that may arise after the clinic visit.      Disclaimer: This note consists of symbols derived from keyboarding, dictation and/or voice recognition software. As a result, there may be errors in the script that have gone undetected. Please consider this when interpreting information found in this chart.       RUFINO Marte D.P.M., [email or web address].

## 2022-07-08 NOTE — LETTER
"    7/8/2022         RE: Albertina Rojas  [id number] [address] [facility] [address]        Dear Colleague,    Thank you for referring your patient, Albertina Rojas, to the [facility] [place]. Please see a copy of my visit note below.    Albertina returns to the office for reevaluation of the left foot.  The patient relates following the instructions given at the last visit with noted overall less pain and equivical improvement in function of the left foot.   Pain continues if she is on her feet more than 20 min, but does feel the pain is less than it was previous.   Did have relief with the use of the oral steroid.  Has noticed some improvement with the use of the Mobic   The patient relates no other problems.    Vitals: /63   Ht 1.664 m (5' 5.5\")   Wt 87.1 kg (192 lb)   LMP  (LMP Unknown)   BMI 31.46 kg/m    BMI= Body mass index is 31.46 kg/m .    Lower Extremity Physical Exam:      Neurovascular status remains unchanged.  Muscular exam is within normal limits to major muscle groups.  Integument is intact.      Noted pain on palpation on the plantar aspect of the left heel near the insertion point of the plantar fascia.  No surrounding erythema or edema noted.         Assessment:     ICD-10-CM    1. Plantar fasciitis, left  M72.2        Plan:    I have explained to Albertina about the progress of the conditions.  At this time, the patient would like to proceed with a steroid injection to the left heel.  The medial aspect of the left heel was swabbed with alcohol.  Next, a mixture of 5mg of Kenalog 10, and 1 cc of 1% lidocaine plain was injected around the medial tubercle of the calcaneal in and around the insertion of the plantar fascia to the left foot.  The patient tolerated the procedure well.  A Band-Aid was applied to the injection site.  The patient will follow up in four weeks.    There is low risk of morbidity with the procedure.  There was no overlap in work associated with the " evaluation/management and the work associated with the procedure.    Albertina verbalized agreement with and understanding of the rational for the diagnosis and treatment plan.  All questions were answered to best of my ability and the patient's satisfaction. The patient was advised to contact the clinic with any questions that may arise after the clinic visit.      Disclaimer: This note consists of symbols derived from keyboarding, dictation and/or voice recognition software. As a result, there may be errors in the script that have gone undetected. Please consider this when interpreting information found in this chart.       RUFINO Marte D.P.M., F.[email or web address].[email or web address].S.        Again, thank you for allowing me to participate in the care of your patient.        Sincerely,        Mason Marte DPM

## 2022-07-08 NOTE — PATIENT INSTRUCTIONS
Next Steps:      Support:  Wear supportive shoes, sandals, boots and/or inserts that have a rigid supportive sole.    This will offload the majority of tension forces that travel through your feet every step you take.    [employer] Max Cushioning Elite/Premier   Skechers Relax Fit D'Lux Walker  Reebok Walk Ultra 7 DMX MAX   Edwin Bondi walking shoes  Superfeet inserts ([email or web address])  It is important that you also wear supportive shoe wear in the house to continue providing support to your feet.    You may always use a cushioned liner for your shoes if that makes your feet feel better.  Stretching  Calf stretching is essential to offload the tension forces that travel through your feet every step you take  Preferred calf stretch is the Runner's Stretch  Place one foot behind the other foot, flat against the ground (it is important to keep the heel on the ground).  The back leg is the one that will be stretched.  Start with the knee straight and lean your hips into the wall, counter or whatever you are leaning into - count to ten.  Next, bend the knee.  You should feel the stretch lower in the calf muscle - count to ten.  Repeat this stretch once an hour to start off with.  When symptoms subside, I recommend performing the stretch 3 times daily to prevent any future problems.                Tissue Massage  It is important that you physically loosen the inflammation tissue to help your body heal the injured tissue.  I recommend soaking your foot in warm water to increase the microcirculation to the soft tissues.  You may add Epson salt to the water if you prefer.  You may apply an over-the-counter muscle rub, such as Voltaren gel, and deeply massage the injured tissue.  Reduce Inflammation  You can ice the injured tissue with an ice pack with a light cloth covering or soaking in ice water 20 minutes to reduce any acute inflammation, typically at the end of the day.      It is important to understand that most  problems that develop in the foot and ankle are caused by excessive tension that cause microinjury to the soft tissues and inflammation in the foot and ankle.  By addressing the underlying causes with support and stretching as well as treating the current inflammatory conditions with tissue massage and anti-inflammatory treatments, most foot and ankle musculoskeletal conditions will resolve.  This may take time to heal.  However, if symptoms persist past 4 weeks you should return to the office for reevaluation to determine further treatment options.    After the Injection     After the injection, strenuous and repetitive activity should be minimized for approximately 48 hours.   Ice should be applied to the injected area at least for the next 48 hours.   Apply ice to the injected area at least 3 - 4 times a day for 20 minutes each time for the next 48 hours. This can reduce the painful  flare  reaction that can follow an injection the next day. This reaction can cause the area that was injected to hurt more the next day just from the injection. This will resolve within a day if it does occur.     Use over-the-counter pain medications such as Tylenol to help with the pain if necessary.     After 48 hours, icing the area may be continued if you find it beneficial.     The lidocaine or marcaine (commonly called Novocain) is an anesthetic agent that is injected with the steroid will typically relieve your pain for a few hours following the injection. If the  Novocain  and steroid are injected near a nerve, you may experience local numbness or weakness from the nerve block until it wears off. After this wears off your pain may return until the steroid takes effect.   The steroid may be effective immediately after the injection. Do not be concerned if the injection is not effective in relieving your symptoms immediately. In some cases, it may take up to two weeks for the steroid to work.   If you are diabetic, the  corticosteroid may cause your blood sugar to become elevated for several days following the injection. This response usually lasts about 2-4 days before it returns to your normal level.   You should report any adverse reaction to you doctor. Call if there are any questions.

## 2022-07-13 ENCOUNTER — TELEPHONE (OUTPATIENT)
Dept: BEHAVIORAL HEALTH | Facility: CLINIC | Age: 37
End: 2022-07-13

## 2022-07-20 ENCOUNTER — VIRTUAL VISIT (OUTPATIENT)
Dept: BEHAVIORAL HEALTH | Facility: CLINIC | Age: 37
End: 2022-07-20
Payer: OTHER GOVERNMENT

## 2022-07-20 DIAGNOSIS — F41.1 GAD (GENERALIZED ANXIETY DISORDER): Primary | ICD-10-CM

## 2022-07-20 NOTE — PROGRESS NOTES
"      [facility]                                    Progress Note    Patient Name: Albertina Rojas  Date: 7/20/2022         Service Type: Individual      Session Start Time: 1400  Session End Time: 1441     Session Length: 41 Minutes    Session #: 15    Attendees: Client attended alone    Service Modality:  Phone Visit:      Provider verified identity through the following two step process.  Patient provided:  Patient is known previously to provider    The patient has been notified of the following:      \"We have found that certain health care needs can be provided without the need for a face to face visit.  This service lets us provide the care you need with a phone conversation.       I will have full access to your [facility] medical record during this entire phone call.   I will be taking notes for your medical record.      Since this is like an office visit, we will bill your insurance company for this service.       There are potential benefits and risks of telephone visits (e.g. limits to patient confidentiality) that differ from in-person visits.?Confidentiality still applies for telephone services, and nobody will record the visit.  It is important to be in a quiet, private space that is free of distractions (including cell phone or other devices) during the visit.??      If during the course of the call I believe a telephone visit is not appropriate, you will not be charged for this service\"     Consent has been obtained for this service by care team member: Yes     DATA  Interactive Complexity: No  Crisis: No        Progress Since Last Session (Related to Symptoms / Goals / Homework):   Symptoms: No change No significant change in overall symptoms. Work stress continues to be consistent    Homework: Achieved / completed to satisfaction      Episode of Care Goals: Satisfactory progress - MAINTENANCE (Working to maintain change, with risk of relapse); Intervened by continuing " "to positively reinforce healthy behavior choice      Current / Ongoing Stressors and Concerns:   Pt referred to the Transition Clinic by her primary care physician at [facility] on 3/1/2022. Per chart review, Albertina is new to [facility]. She was previously living in [facility] and recently moved to [place]. She is here today to discuss anxiety. She feels that she has had anxiety for years. She has been able to stay busy with her schedule and will typically have good and bad days. She often has thought about making an appointment on the bad days, but then the good days seem to outweigh and she does not schedule. Patient was referred to the [facility] for long-term therapy and is scheduled for 7/25/22. This provider sent the patient several possible long-term providers that accept her  insurance of [facility]. The patient reports that she reached out to one of the providers and discovered that there would be several steps to take in order for her to be able to start with this provider. The patient reports that even discussing the \"red tape\" of working with this provider caused her anxiety to \"skyrocket\". The patient states that she would like to continue to see this provider until she starts with the new provider at [facility]. Patient is scheduled with a long-term therapist on 7/25/2022 with [facility].    Today, 7/20/2022, the patient reports continued work stressors that she doesn't feel there will be any change with. The pt states that her main supervisor continues to task her with things that are out of her role. Pt states she continues to communicate with her supervisors about her frustrations and states she feels \"like a broken record\" and that they likely think she \"complains all the time\".  The patient states the only other option she has is to let things \"fall apart\" so that everyone can see all that she has been doing that aren't her responsibility.     At home, the pt reports things are good and she " and her partner have been very productive with the home renovations and this has been good for her mood and motivation. Feeling excited about these upgrades.     Continues to struggle with communication with her ex- (her 3 y.o. sons father).     Pt expressed comprehension and acceptance of informed consent and the nature of / limitations to confidentiality due to mandated reporting when reviewed in session.       Treatment Objective(s) Addressed in This Session:   identify three distraction and diversion activities and use those activities to decrease level of anxiety    practice the use of timeouts  Practice ongoing boundary settings with fathers son     Intervention:    CBT: guided discussion on anxiety triggers and reviewed distress tolerance techniques  Motivational Interviewing: active listening, validation, and reassurance          ASSESSMENT: Current Emotional / Mental Status (status of significant symptoms):   Risk status (Self / Other harm or suicidal ideation)   Patient denies current fears or concerns for personal safety.   Patient denies current or recent suicidal ideation or behaviors.   Patient denies current or recent homicidal ideation or behaviors.   Patient denies current or recent self injurious behavior or ideation.   Patient denies other safety concerns.   Patient reports there has been no change in risk factors since their last session.     Patient reports there has been no change in protective factors since their last session.     Recommended that patient call 911 or go to the local ED should there be a change in any of these risk factors.     Appearance:   Unable to assess; phone session    Eye Contact:   Unable to assess; phone session    Psychomotor Behavior: Unable to assess; phone session    Attitude:   Cooperative    Orientation:   All   Speech    Rate / Production: Normal     Volume:  Normal    Mood:    Anxious  Normal   Affect:    Unable to assess; phone session    Thought  "Content:  Clear    Thought Form:  Coherent  Logical    Insight:    Good      Medication Review:   No changes to current psychiatric medication(s)     Medication Compliance:   Yes     Changes in Health Issues:   None reported     Chemical Use Review:   Substance Use: Chemical use reviewed, no active concerns identified      Tobacco Use: No current tobacco use.      Diagnosis:  1. AMELIA (generalized anxiety disorder) F41.1       Collateral Reports Completed:   Communicated with: Serena Christensen new long-term therapist through [facility]     Jason Lyons. Just reaching out about a patient that is scheduled with you for next week. MRN: [id number]. I've been seeing her for transition therapy in the Transition Clinic. We completed our 15th session today. She asked that I reach out to you in order to coordinate care prior to her first session with you so that you have some background on her. I completed a DA for her on 3/23/22 and all of my progress notes are listed in epic. Most of them are under \"virtual visit\" as we only recently were able to schedule under individual provider names. Feel free to reach out with any questions. Thanks.    PLAN: (Patient Tasks / Therapist Tasks / Other)  Patient will continue to practice paired muscle relaxation techniques to help with physical symptoms of anxiety  Patient will use distraction (tv, phone games) when feeling her anxiety begin to increase  Pt will attend therapy appointment at [facility] that is scheduled for 7/25/2022        Dara Mendes [facility], July 20, 2022                                                         ______________________________________________________________________    Individual Treatment Plan    Patient's Name: Albertina Rojas  YOB: 1985    Date of Creation: Updated 7/20/2022 - initial 4/13/2022  Date Treatment Plan Last Reviewed/Revised: updated 7/20/2022    DSM5 Diagnoses: 300.02 (F41.1) Generalized Anxiety Disorder or Adjustment Disorders  309.28 " (F43.23) With mixed anxiety and depressed mood  Psychosocial / Contextual Factors: 36 y.o.  female. Active . Co-parenting 3 y.o. with ex- (conflict); high stress job; relationship conflict  PROMIS (reviewed every 90 days): 24    Referral / Collaboration:  Referral to another professional/service is not indicated at this time..     Anticipated number of session for this episode of care: weekly until next LOC - July 25, 2022  Anticipation frequency of session: Weekly  Anticipated Duration of each session: 38-52 minutes  Treatment plan will be reviewed in 90 days or when goals have been changed.       MeasurableTreatment Goal(s) related to diagnosis / functional impairment(s)  Goal 1: Patient will increase awareness of anxiety and their impact on functioning and develop skills to reduce negative impact.    I will know I've met my goal when I can identify anxiety triggers and use coping skills to manage symptoms.      Objective #A (Patient Action)    Patient will Patient will describe thoughts, feelings, and actions associated with anxiety.  Status: Continued - Date(s): 7/20/2022    Intervention(s)  Therapist will explore and process with patient how anxiety has impacted them.     Objective #B  Patient will increase distress tolerance coping sklls.  Status: Continued - Date(s): 7/20/2022    Intervention(s)  Therapist will teach distraction skills. and will model their use.           Goal 2: Patient will identify anxiety triggers and maladaptive responses to them     Objective #A (Patient Action)                          Status:  Continued - Date(s): 7/20/2022    Patient will identify situations, thoughts, and behaviors that trigger anxiety.     Intervention(s)  Therapist will facilitate guided discussion.     Objective #B  Patient will identify thoughts and beliefs about self and the world as they relate to anxiety.                         Status:   Continued - Date(s):  7/20/2022    Intervention(s)  Therapist will guide discussion and assist patient in identification of negative beliefs.  .       Dara Mendes, [facility]  July 20, 2022

## 2022-07-25 ENCOUNTER — VIRTUAL VISIT (OUTPATIENT)
Dept: PSYCHOLOGY | Facility: CLINIC | Age: 37
End: 2022-07-25
Attending: PHYSICIAN ASSISTANT
Payer: OTHER GOVERNMENT

## 2022-07-25 DIAGNOSIS — F41.1 GAD (GENERALIZED ANXIETY DISORDER): ICD-10-CM

## 2022-07-25 PROCEDURE — 90791 PSYCH DIAGNOSTIC EVALUATION: CPT | Mod: 95
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
## 2022-08-01 ENCOUNTER — VIRTUAL VISIT (OUTPATIENT)
Dept: PSYCHOLOGY | Facility: CLINIC | Age: 37
End: 2022-08-01
Payer: OTHER GOVERNMENT

## 2022-08-01 DIAGNOSIS — F41.1 GAD (GENERALIZED ANXIETY DISORDER): Primary | ICD-10-CM

## 2022-08-01 PROCEDURE — 90834 PSYTX W PT 45 MINUTES: CPT | Mod: 95

## 2022-08-01 ASSESSMENT — COLUMBIA-SUICIDE SEVERITY RATING SCALE - C-SSRS
4. HAVE YOU HAD THESE THOUGHTS AND HAD SOME INTENTION OF ACTING ON THEM?: NO
6. HAVE YOU EVER DONE ANYTHING, STARTED TO DO ANYTHING, OR PREPARED TO DO ANYTHING TO END YOUR LIFE?: NO
2. HAVE YOU ACTUALLY HAD ANY THOUGHTS OF KILLING YOURSELF IN THE PAST MONTH?: NO
1. IN THE PAST MONTH, HAVE YOU WISHED YOU WERE DEAD OR WISHED YOU COULD GO TO SLEEP AND NOT WAKE UP?: NO
5. HAVE YOU STARTED TO WORK OUT OR WORKED OUT THE DETAILS OF HOW TO KILL YOURSELF? DO YOU INTEND TO CARRY OUT THIS PLAN?: NO
3. HAVE YOU BEEN THINKING ABOUT HOW YOU MIGHT KILL YOURSELF?: NO
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
## 2022-08-05 ENCOUNTER — TELEPHONE (OUTPATIENT)
Dept: PODIATRY | Facility: CLINIC | Age: 37
End: 2022-08-05

## 2022-08-05 DIAGNOSIS — M72.2 PLANTAR FASCIITIS, LEFT: ICD-10-CM

## 2022-08-05 RX ORDER — MELOXICAM 7.5 MG/1
7.5 TABLET ORAL DAILY
Qty: 30 TABLET | Refills: 1 | Status: SHIPPED | OUTPATIENT
Start: 2022-08-05 | End: 2022-11-04

## 2022-08-05 NOTE — TELEPHONE ENCOUNTER
Please notify the patient that a prescribed refill for Mobic 7.5 was approved and sent to their pharmacy.  The patient will need to return to clinic for reevaluation if symptoms persist.

## 2022-08-05 NOTE — TELEPHONE ENCOUNTER
Medication Request for: Meloxicam 7.5MG tab            Patient currently taking: celxa  Patient has 1 tab  of medication remaining.  Patient is also taking OTC: 0    Problems/ Concerns of Patient: no side effects reported  Prescription last written on 07/07/2022 by Dr. Marte  Sig: Jerica Kovacs MA on 8/5/2022 at 11:02 AM   Last Fill Quantity: 30 tabs with # refills: 0     Last Office Visit by provider: 07/08/2022  Date of Surgery (if applicable): 0  Procedure Performed (if applicable): 0  Future Office Visit:   4 week F/U  Patient desires to have faxed or E-prescribe to pharmacy if available  Pharmacy selected in [facility].    Phone number patient can be reached at: Cell number on file:    Telephone Information:   Mobile [phone]       Can we leave a detailed message on this number? YES    Medication requests may take up to 3 business days for a response  Has patient been notified of this Unknown

## 2022-08-22 ENCOUNTER — VIRTUAL VISIT (OUTPATIENT)
Dept: PSYCHOLOGY | Facility: CLINIC | Age: 37
End: 2022-08-22
Payer: OTHER GOVERNMENT

## 2022-08-22 DIAGNOSIS — F41.1 GAD (GENERALIZED ANXIETY DISORDER): Primary | ICD-10-CM

## 2022-08-22 PROCEDURE — 90834 PSYTX W PT 45 MINUTES: CPT | Mod: 95
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
## 2022-09-06 ENCOUNTER — VIRTUAL VISIT (OUTPATIENT)
Dept: PSYCHOLOGY | Facility: CLINIC | Age: 37
End: 2022-09-06
Payer: OTHER GOVERNMENT

## 2022-09-06 DIAGNOSIS — F41.1 GAD (GENERALIZED ANXIETY DISORDER): Primary | ICD-10-CM

## 2022-09-06 PROCEDURE — 90834 PSYTX W PT 45 MINUTES: CPT | Mod: 95
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
## 2022-09-19 ENCOUNTER — VIRTUAL VISIT (OUTPATIENT)
Dept: PSYCHOLOGY | Facility: CLINIC | Age: 37
End: 2022-09-19
Payer: OTHER GOVERNMENT

## 2022-09-19 DIAGNOSIS — F41.1 GAD (GENERALIZED ANXIETY DISORDER): Primary | ICD-10-CM

## 2022-09-19 PROCEDURE — 90834 PSYTX W PT 45 MINUTES: CPT | Mod: 95
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
## 2022-10-01 ENCOUNTER — HEALTH MAINTENANCE LETTER (OUTPATIENT)
Age: 37
End: 2022-10-01

## 2022-10-03 DIAGNOSIS — M72.2 PLANTAR FASCIITIS, LEFT: ICD-10-CM

## 2022-10-03 RX ORDER — MELOXICAM 7.5 MG/1
7.5 TABLET ORAL DAILY
Qty: 30 TABLET | Refills: 1 | OUTPATIENT
Start: 2022-10-03

## 2022-10-04 ENCOUNTER — VIRTUAL VISIT (OUTPATIENT)
Dept: PSYCHOLOGY | Facility: CLINIC | Age: 37
End: 2022-10-04
Payer: OTHER GOVERNMENT

## 2022-10-04 DIAGNOSIS — F41.1 GAD (GENERALIZED ANXIETY DISORDER): Primary | ICD-10-CM

## 2022-10-04 PROCEDURE — 90834 PSYTX W PT 45 MINUTES: CPT | Mod: 95
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
## 2022-10-25 ENCOUNTER — VIRTUAL VISIT (OUTPATIENT)
Dept: PSYCHOLOGY | Facility: CLINIC | Age: 37
End: 2022-10-25
Payer: OTHER GOVERNMENT

## 2022-10-25 DIAGNOSIS — F41.1 GAD (GENERALIZED ANXIETY DISORDER): Primary | ICD-10-CM

## 2022-10-25 PROCEDURE — 90834 PSYTX W PT 45 MINUTES: CPT | Mod: 95
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
## 2022-11-04 ENCOUNTER — OFFICE VISIT (OUTPATIENT)
Dept: PODIATRY | Facility: CLINIC | Age: 37
End: 2022-11-04
Payer: OTHER GOVERNMENT

## 2022-11-04 VITALS
SYSTOLIC BLOOD PRESSURE: 109 MMHG | BODY MASS INDEX: 30.86 KG/M2 | HEIGHT: 66 IN | WEIGHT: 192 LBS | DIASTOLIC BLOOD PRESSURE: 71 MMHG | HEART RATE: 62 BPM

## 2022-11-04 DIAGNOSIS — M72.2 PLANTAR FASCIITIS, LEFT: Primary | ICD-10-CM

## 2022-11-04 PROCEDURE — 20550 NJX 1 TENDON SHEATH/LIGAMENT: CPT | Mod: LT | Performed by: PODIATRIST

## 2022-11-04 PROCEDURE — 99213 OFFICE O/P EST LOW 20 MIN: CPT | Mod: 25 | Performed by: PODIATRIST

## 2022-11-04 RX ORDER — LIDOCAINE HYDROCHLORIDE 10 MG/ML
1 INJECTION, SOLUTION INFILTRATION; PERINEURAL ONCE
Status: COMPLETED | OUTPATIENT
Start: 2022-11-04 | End: 2022-11-04

## 2022-11-04 RX ADMIN — LIDOCAINE HYDROCHLORIDE 1 ML: 10 INJECTION, SOLUTION INFILTRATION; PERINEURAL at 10:16

## 2022-11-04 ASSESSMENT — PAIN SCALES - GENERAL: PAINLEVEL: MODERATE PAIN (4)

## 2022-11-04 NOTE — PROGRESS NOTES
"Albertina returns to the office for reevaluation of the left foot.  The patient relates following the instructions given at the last visit with noted overall more pain and less improvement in function of the left foot.   The patient relates no other problems.    Vitals: /71   Pulse 62   Ht 1.664 m (5' 5.5\")   Wt 87.1 kg (192 lb)   BMI 31.46 kg/m    BMI= Body mass index is 31.46 kg/m .    Lower Extremity Physical Exam:      Neurovascular status remains unchanged.  Muscular exam is within normal limits to major muscle groups.  Integument is intact.      Noted pain on palpation under the plantar aspect of the left heel near the insertion point of the plantar fascia.  No surrounding erythema noted.         Assessment:     ICD-10-CM    1. Plantar fasciitis, left  M72.2 INJECTION SINGLE TENDON SHEATH/LIGAMENT     triamcinolone acetonide (KENALOG-10) injection 10 mg     lidocaine 1 % injection 1 mL          Plan:    I have explained to Albertina about the progress of the conditions.  At this time, the patient would like to proceed with a steroid injection on the left heel.  The medial aspect of the left heel was swabbed with alcohol.  Next, a mixture of 5mg of Kenalog 10, and 2 cc of 1% lidocaine plain was injected around the medial tubercle of the calcaneal in and around the insertion of the plantar fascia to the left foot.  The patient tolerated the procedure well.  A Band-Aid was applied to the injection site.    The patient was encouraged to continue wearing offloading supportive shoes and other devices.  The patient was encouraged to perform calf stretches 3 times daily to prevent future recurrence.  The patient was instructed to continue to perform warm soaks with Epson salt after which to also apply over-the-counter Voltaren gel to deeply massage the injured tissue.   The patient may follow up in four weeks if problems persist.    Albertina verbalized agreement with and understanding of the rational for the " diagnosis and treatment plan.  All questions were answered to best of my ability and the patient's satisfaction. The patient was advised to contact the clinic with any questions that may arise after the clinic visit.      Disclaimer: This note consists of symbols derived from keyboarding, dictation and/or voice recognition software. As a result, there may be errors in the script that have gone undetected. Please consider this when interpreting information found in this chart.       RUFINO Marte D.P.M., [email or web address].

## 2022-11-04 NOTE — LETTER
"    11/4/2022         RE: Albertina Rojas  [id number] [facility] [id number]        Dear Colleague,    Thank you for referring your patient, Albertina Rojas, to the [facility] [place]. Please see a copy of my visit note below.    Albertina returns to the office for reevaluation of the left foot.  The patient relates following the instructions given at the last visit with noted overall more pain and less improvement in function of the left foot.   The patient relates no other problems.    Vitals: /71   Pulse 62   Ht 1.664 m (5' 5.5\")   Wt 87.1 kg (192 lb)   BMI 31.46 kg/m    BMI= Body mass index is 31.46 kg/m .    Lower Extremity Physical Exam:      Neurovascular status remains unchanged.  Muscular exam is within normal limits to major muscle groups.  Integument is intact.      Noted pain on palpation under the plantar aspect of the left heel near the insertion point of the plantar fascia.  No surrounding erythema noted.         Assessment:     ICD-10-CM    1. Plantar fasciitis, left  M72.2 INJECTION SINGLE TENDON SHEATH/LIGAMENT     triamcinolone acetonide (KENALOG-10) injection 10 mg     lidocaine 1 % injection 1 mL          Plan:    I have explained to Albertina about the progress of the conditions.  At this time, the patient would like to proceed with a steroid injection on the left heel.  The medial aspect of the left heel was swabbed with alcohol.  Next, a mixture of 5mg of Kenalog 10, and 2 cc of 1% lidocaine plain was injected around the medial tubercle of the calcaneal in and around the insertion of the plantar fascia to the left foot.  The patient tolerated the procedure well.  A Band-Aid was applied to the injection site.    The patient was encouraged to continue wearing offloading supportive shoes and other devices.  The patient was encouraged to perform calf stretches 3 times daily to prevent future recurrence.  The patient was instructed to continue to perform warm soaks " with Epson salt after which to also apply over-the-counter Voltaren gel to deeply massage the injured tissue.   The patient may follow up in four weeks if problems persist.    Albertina verbalized agreement with and understanding of the rational for the diagnosis and treatment plan.  All questions were answered to best of my ability and the patient's satisfaction. The patient was advised to contact the clinic with any questions that may arise after the clinic visit.      Disclaimer: This note consists of symbols derived from keyboarding, dictation and/or voice recognition software. As a result, there may be errors in the script that have gone undetected. Please consider this when interpreting information found in this chart.       PASCUAL Willard.P.M., F.A.C.F.A.S.        Again, thank you for allowing me to participate in the care of your patient.        Sincerely,        Mason Marte DPM

## 2022-11-04 NOTE — PATIENT INSTRUCTIONS
After the Injection     After the injection, strenuous and repetitive activity should be minimized for approximately 48 hours.   Ice should be applied to the injected area at least for the next 48 hours.   Apply ice to the injected area at least 3 - 4 times a day for 20 minutes each time for the next 48 hours. This can reduce the painful  flare  reaction that can follow an injection the next day. This reaction can cause the area that was injected to hurt more the next day just from the injection. This will resolve within a day if it does occur.     Use over-the-counter pain medications such as Tylenol to help with the pain if necessary.     After 48 hours, icing the area may be continued if you find it beneficial.     The lidocaine or marcaine (commonly called Novocain) is an anesthetic agent that is injected with the steroid will typically relieve your pain for a few hours following the injection. If the  Novocain  and steroid are injected near a nerve, you may experience local numbness or weakness from the nerve block until it wears off. After this wears off your pain may return until the steroid takes effect.   The steroid may be effective immediately after the injection. Do not be concerned if the injection is not effective in relieving your symptoms immediately. In some cases, it may take up to two weeks for the steroid to work.   If you are diabetic, the corticosteroid may cause your blood sugar to become elevated for several days following the injection. This response usually lasts about 2-4 days before it returns to your normal level.   You should report any adverse reaction to you doctor. Call if there are any questions.     Next Steps:      Support:  Wear supportive shoes, sandals, boots and/or inserts that have a rigid supportive sole.    This will offload the majority of tension forces that travel through your feet every step you take.    Skechers Max Cushioning Elite/Premier   Skechers Relax Fit D'Lux  Levi Frost Walk Ultra 7 DMX MAX   Edwin Melendez walking shoes  Superfeet inserts ([email or web address])  It is important that you also wear supportive shoe wear in the house to continue providing support to your feet.    You may always use a cushioned liner for your shoes if that makes your feet feel better.  Stretching  Calf stretching is essential to offload the tension forces that travel through your feet every step you take  Preferred calf stretch is the Runner's Stretch  Place one foot behind the other foot, flat against the ground (it is important to keep the heel on the ground).  The back leg is the one that will be stretched.  Start with the knee straight and lean your hips into the wall, counter or whatever you are leaning into - count to ten.  Next, bend the knee.  You should feel the stretch lower in the calf muscle - count to ten.  Repeat this stretch once an hour to start off with.  When symptoms subside, I recommend performing the stretch 3 times daily to prevent any future problems.                Tissue Massage  It is important that you physically loosen the inflammation tissue to help your body heal the injured tissue.  I recommend soaking your foot in warm water to increase the microcirculation to the soft tissues.  You may add Epson salt to the water if you prefer.  You may apply an over-the-counter muscle rub, such as Voltaren gel, and deeply massage the injured tissue.  Reduce Inflammation  You can ice the injured tissue with an ice pack with a light cloth covering or soaking in ice water 20 minutes to reduce any acute inflammation, typically at the end of the day.      It is important to understand that most problems that develop in the foot and ankle are caused by excessive tension that cause microinjury to the soft tissues and inflammation in the foot and ankle.  By addressing the underlying causes with support and stretching as well as treating the current inflammatory conditions with tissue  massage and anti-inflammatory treatments, most foot and ankle musculoskeletal conditions will resolve.  This may take time to heal.  However, if symptoms persist past 4 weeks you should return to the office for reevaluation to determine further treatment options.      Flu vaccines are now available at all [facility] clinics and retail pharmacies across the [facility]. Appointments are required for clinic locations. To schedule an appointment online, please log into [facility] [employer] [facility] or create an account if you are a new user. You can also call [phone], or simply walk in at one of the [facility] retail pharmacy locations.      [facility] - Login Page

## 2022-11-04 NOTE — NURSING NOTE
"Chief Complaint   Patient presents with     RECHECK     Pain has gotten worse in left heel       Initial /71   Pulse 62   Ht 1.664 m (5' 5.5\")   Wt 87.1 kg (192 lb)   BMI 31.46 kg/m   Estimated body mass index is 31.46 kg/m  as calculated from the following:    Height as of this encounter: 1.664 m (5' 5.5\").    Weight as of this encounter: 87.1 kg (192 lb).  Medications and allergies reviewed.      Jerica GARNER MA    "

## 2022-11-15 ENCOUNTER — VIRTUAL VISIT (OUTPATIENT)
Dept: PSYCHOLOGY | Facility: CLINIC | Age: 37
End: 2022-11-15
Payer: OTHER GOVERNMENT

## 2022-11-15 DIAGNOSIS — F41.1 GAD (GENERALIZED ANXIETY DISORDER): Primary | ICD-10-CM

## 2022-11-15 PROCEDURE — 90834 PSYTX W PT 45 MINUTES: CPT | Mod: 95
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
## 2022-11-28 ENCOUNTER — VIRTUAL VISIT (OUTPATIENT)
Dept: PSYCHOLOGY | Facility: CLINIC | Age: 37
End: 2022-11-28
Payer: OTHER GOVERNMENT

## 2022-11-28 DIAGNOSIS — F41.1 GAD (GENERALIZED ANXIETY DISORDER): Primary | ICD-10-CM

## 2022-11-28 PROCEDURE — 90834 PSYTX W PT 45 MINUTES: CPT | Mod: 95
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
## 2022-12-14 ENCOUNTER — VIRTUAL VISIT (OUTPATIENT)
Dept: PSYCHOLOGY | Facility: CLINIC | Age: 37
End: 2022-12-14
Payer: OTHER GOVERNMENT

## 2022-12-14 DIAGNOSIS — F41.1 GAD (GENERALIZED ANXIETY DISORDER): Primary | ICD-10-CM

## 2022-12-14 PROCEDURE — 90834 PSYTX W PT 45 MINUTES: CPT | Mod: 95
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
## 2023-01-05 ENCOUNTER — VIRTUAL VISIT (OUTPATIENT)
Dept: PSYCHOLOGY | Facility: CLINIC | Age: 38
End: 2023-01-05
Payer: OTHER GOVERNMENT

## 2023-01-05 DIAGNOSIS — F41.1 GAD (GENERALIZED ANXIETY DISORDER): Primary | ICD-10-CM

## 2023-01-05 PROCEDURE — 90834 PSYTX W PT 45 MINUTES: CPT | Mod: 95
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
## 2023-01-19 ENCOUNTER — VIRTUAL VISIT (OUTPATIENT)
Dept: PSYCHOLOGY | Facility: CLINIC | Age: 38
End: 2023-01-19
Payer: OTHER GOVERNMENT

## 2023-01-19 DIAGNOSIS — F41.1 GAD (GENERALIZED ANXIETY DISORDER): Primary | ICD-10-CM

## 2023-01-19 PROCEDURE — 90834 PSYTX W PT 45 MINUTES: CPT | Mod: 95
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
## 2023-02-01 ENCOUNTER — VIRTUAL VISIT (OUTPATIENT)
Dept: PSYCHOLOGY | Facility: CLINIC | Age: 38
End: 2023-02-01
Payer: OTHER GOVERNMENT

## 2023-02-01 DIAGNOSIS — F41.1 GAD (GENERALIZED ANXIETY DISORDER): Primary | ICD-10-CM

## 2023-02-01 PROCEDURE — 90834 PSYTX W PT 45 MINUTES: CPT | Mod: 95
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
## 2023-02-03 ENCOUNTER — OFFICE VISIT (OUTPATIENT)
Dept: PODIATRY | Facility: CLINIC | Age: 38
End: 2023-02-03
Payer: OTHER GOVERNMENT

## 2023-02-03 VITALS
DIASTOLIC BLOOD PRESSURE: 80 MMHG | HEART RATE: 84 BPM | SYSTOLIC BLOOD PRESSURE: 127 MMHG | BODY MASS INDEX: 30.86 KG/M2 | WEIGHT: 192 LBS | HEIGHT: 66 IN

## 2023-02-03 DIAGNOSIS — M72.2 PLANTAR FASCIITIS, LEFT: Primary | ICD-10-CM

## 2023-02-03 PROCEDURE — 99213 OFFICE O/P EST LOW 20 MIN: CPT | Performed by: PODIATRIST

## 2023-02-03 RX ORDER — MELOXICAM 15 MG/1
15 TABLET ORAL DAILY
Qty: 30 TABLET | Refills: 1 | Status: SHIPPED | OUTPATIENT
Start: 2023-02-03 | End: 2023-04-07

## 2023-02-03 ASSESSMENT — PAIN SCALES - GENERAL: PAINLEVEL: SEVERE PAIN (7)

## 2023-02-03 NOTE — PATIENT INSTRUCTIONS

## 2023-02-03 NOTE — NURSING NOTE
"Chief Complaint   Patient presents with     Consult     Left heel pain       Initial /80   Pulse 84   Ht 1.664 m (5' 5.5\")   Wt 87.1 kg (192 lb)   BMI 31.46 kg/m   Estimated body mass index is 31.46 kg/m  as calculated from the following:    Height as of this encounter: 1.664 m (5' 5.5\").    Weight as of this encounter: 87.1 kg (192 lb).  Medications and allergies reviewed.      Jerica GARNER MA    "

## 2023-02-03 NOTE — LETTER
"    2/3/2023         RE: Albertina Rojas  [address] [address]        Dear Colleague,    Thank you for referring your patient, Albertina Rojas, to the [facility] [place]. Please see a copy of my visit note below.    Albertina returns to the office for reevaluation of the left foot.  The patient relates following the instructions given at the last visit with noted overall continued pain and minimal improvement in function of the left foot.   The patient relates no other problems.    Vitals: /80   Pulse 84   Ht 1.664 m (5' 5.5\")   Wt 87.1 kg (192 lb)   BMI 31.46 kg/m    BMI= Body mass index is 31.46 kg/m .    Lower Extremity Physical Exam:      Neurovascular status remains unchanged.  Muscular exam is within normal limits to major muscle groups.  Integument is intact.      Noted pain on palpation under the left heel near the insertion point of the plantar fascia.  No surrounding erythema or edema noted.  Negative straight leg raise test noted.           Assessment:     ICD-10-CM    1. Plantar fasciitis, left  M72.2 [facility] DME Orthotic; Foot Orthotics (functional rigid support); Bilateral     meloxicam (MOBIC) 15 MG tablet          Plan:    I have explained to Albertina about the progress of the conditions.  At this time, the patient was educated on the importance of offloading supportive shoes and other devices.  I demonstrated to the patient calf stretches to perform every hour daily until symptoms resolve.  After symptoms resolve, the patient was advised to perform the stretches 3 times daily to prevent future recurrence.  The patient was instructed to perform warm soaks with Epson salt after which to also apply over-the-counter Voltaren gel to deeply massage the injured tissue.  The patient was instructed to do this on a daily basis until symptoms resolve.  The patient was prescribed custom orthtotics that will aid in offloading the tension forces to " the soft tissues and prevent further inflammation.  To reduce the amount of current inflammation, the patient was prescribed Mobic 15 mg to be taken daily with food and instructed to stop taking if any stomach irritation or swelling in extremities are noted.  The patient may return in four weeks for reevaluation to determine if any further treatment will be needed.        Albertina verbalized agreement with and understanding of the rational for the diagnosis and treatment plan.  All questions were answered to best of my ability and the patient's satisfaction. The patient was advised to contact the clinic with any questions that may arise after the clinic visit.      Disclaimer: This note consists of symbols derived from keyboarding, dictation and/or voice recognition software. As a result, there may be errors in the script that have gone undetected. Please consider this when interpreting information found in this chart.       PASCUAL Willard.P.JEYSON., F.A.C.F.A.S.        Again, thank you for allowing me to participate in the care of your patient.        Sincerely,        Mason Marte DPM

## 2023-02-03 NOTE — PROGRESS NOTES
"Albertina returns to the office for reevaluation of the left foot.  The patient relates following the instructions given at the last visit with noted overall continued pain and minimal improvement in function of the left foot.   The patient relates no other problems.    Vitals: /80   Pulse 84   Ht 1.664 m (5' 5.5\")   Wt 87.1 kg (192 lb)   BMI 31.46 kg/m    BMI= Body mass index is 31.46 kg/m .    Lower Extremity Physical Exam:      Neurovascular status remains unchanged.  Muscular exam is within normal limits to major muscle groups.  Integument is intact.      Noted pain on palpation under the left heel near the insertion point of the plantar fascia.  No surrounding erythema or edema noted.  Negative straight leg raise test noted.           Assessment:     ICD-10-CM    1. Plantar fasciitis, left  M72.2 [facility] Orthotic; Foot Orthotics (functional rigid support); Bilateral     meloxicam (MOBIC) 15 MG tablet          Plan:    I have explained to Albertina about the progress of the conditions.  At this time, the patient was educated on the importance of offloading supportive shoes and other devices.  I demonstrated to the patient calf stretches to perform every hour daily until symptoms resolve.  After symptoms resolve, the patient was advised to perform the stretches 3 times daily to prevent future recurrence.  The patient was instructed to perform warm soaks with Epson salt after which to also apply over-the-counter Voltaren gel to deeply massage the injured tissue.  The patient was instructed to do this on a daily basis until symptoms resolve.  The patient was prescribed custom orthtotics that will aid in offloading the tension forces to the soft tissues and prevent further inflammation.  To reduce the amount of current inflammation, the patient was prescribed Mobic 15 mg to be taken daily with food and instructed to stop taking if any stomach irritation or swelling in extremities are " noted.  The patient may return in four weeks for reevaluation to determine if any further treatment will be needed.        Albertina verbalized agreement with and understanding of the rational for the diagnosis and treatment plan.  All questions were answered to best of my ability and the patient's satisfaction. The patient was advised to contact the clinic with any questions that may arise after the clinic visit.      Disclaimer: This note consists of symbols derived from keyboarding, dictation and/or voice recognition software. As a result, there may be errors in the script that have gone undetected. Please consider this when interpreting information found in this chart.       RUFINO Marte D.P.M., [email or web address].

## 2023-03-01 ENCOUNTER — VIRTUAL VISIT (OUTPATIENT)
Dept: PSYCHOLOGY | Facility: CLINIC | Age: 38
End: 2023-03-01
Payer: OTHER GOVERNMENT

## 2023-03-01 DIAGNOSIS — F41.1 GAD (GENERALIZED ANXIETY DISORDER): Primary | ICD-10-CM

## 2023-03-01 PROCEDURE — 90834 PSYTX W PT 45 MINUTES: CPT | Mod: 95
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
## 2023-03-03 ENCOUNTER — ANCILLARY PROCEDURE (OUTPATIENT)
Dept: GENERAL RADIOLOGY | Facility: CLINIC | Age: 38
End: 2023-03-03
Attending: PODIATRIST
Payer: OTHER GOVERNMENT

## 2023-03-03 ENCOUNTER — OFFICE VISIT (OUTPATIENT)
Dept: PODIATRY | Facility: CLINIC | Age: 38
End: 2023-03-03
Payer: OTHER GOVERNMENT

## 2023-03-03 VITALS
SYSTOLIC BLOOD PRESSURE: 117 MMHG | BODY MASS INDEX: 30.86 KG/M2 | HEIGHT: 66 IN | WEIGHT: 192 LBS | HEART RATE: 67 BPM | DIASTOLIC BLOOD PRESSURE: 56 MMHG

## 2023-03-03 DIAGNOSIS — M72.2 PLANTAR FASCIITIS, LEFT: Primary | ICD-10-CM

## 2023-03-03 PROCEDURE — 99213 OFFICE O/P EST LOW 20 MIN: CPT | Performed by: PODIATRIST

## 2023-03-03 PROCEDURE — 73650 X-RAY EXAM OF HEEL: CPT | Mod: TC | Performed by: RADIOLOGY

## 2023-03-03 RX ORDER — METHYLPREDNISOLONE 4 MG
4 TABLET, DOSE PACK ORAL SEE ADMIN INSTRUCTIONS
Qty: 21 TABLET | Refills: 0 | Status: SHIPPED | OUTPATIENT
Start: 2023-03-03 | End: 2023-03-31

## 2023-03-03 ASSESSMENT — PAIN SCALES - GENERAL: PAINLEVEL: MODERATE PAIN (5)

## 2023-03-03 NOTE — LETTER
"    3/3/2023         RE: Albertina Rojas  [address] [address] [address]        Dear Colleague,    Thank you for referring your patient, Albertina Rojas, to the [facility] [place]. Please see a copy of my visit note below.    Albertina returns to the office for reevaluation of the left foot.  The patient relates following the instructions given at the last visit with noted overall continued pain and minimal improvement in function of the left foot.   The patient relates no other problems.    Vitals: /56   Pulse 67   Ht 1.664 m (5' 5.5\")   Wt 87.1 kg (192 lb)   BMI 31.46 kg/m    BMI= Body mass index is 31.46 kg/m .    Lower Extremity Physical Exam:      Neurovascular status remains unchanged.  Muscular exam is within normal limits to major muscle groups.  Integument is intact.      Noted pain on palpation on the plantar aspect of the left heel near the insertion point of the plantar fascia.  No surrounding erythema or edema noted.    Diagnostics:  Radiograph evaluation including calcaneal lateral and axial views of the left foot reveals small calcaneal spurring.  No evidence of stress fracture.  I personally evaluated the images as well as reviewed the images with the patient pointing out the findings.      Assessment:     ICD-10-CM    1. Plantar fasciitis, left  M72.2 XR Calcaneus Left G/E 2 Views     methylPREDNISolone (MEDROL DOSEPAK) 4 MG tablet therapy pack     Physical Therapy Referral          Plan:    I have explained to Albertina about the progress of the conditions.  At this time, the patient was educated on the importance of offloading supportive shoes and other devices.  I demonstrated to the patient calf stretches to perform every hour daily until symptoms resolve.  After symptoms resolve, the patient was advised to perform the stretches 3 times daily to prevent future recurrence.  The patient was instructed to perform warm soaks with Epson salt after which to also apply " over-the-counter Voltaren gel to deeply massage the injured tissue.  The patient was instructed to do this on a daily basis until symptoms resolve.   The patient was referred to physical therapy for deep tissue [place] fascial release to the plantar fascia on the left.  To reduce the amount of current inflammation, the patient was prescribed a Medrol Dosepak to be taken daily with food and instructed to stop taking if any stomach irritation or swelling in extremities are noted.  The patient may return in four weeks for reevaluation to determine if any further treatment will be needed.      Albertina verbalized agreement with and understanding of the rational for the diagnosis and treatment plan.  All questions were answered to best of my ability and the patient's satisfaction. The patient was advised to contact the clinic with any questions that may arise after the clinic visit.      Disclaimer: This note consists of symbols derived from keyboarding, dictation and/or voice recognition software. As a result, there may be errors in the script that have gone undetected. Please consider this when interpreting information found in this chart.       PASCUAL Willard.P.JEYSON., F.A.C.F.A.S.        Again, thank you for allowing me to participate in the care of your patient.        Sincerely,        Mason Marte DPM

## 2023-03-03 NOTE — PATIENT INSTRUCTIONS

## 2023-03-03 NOTE — PROGRESS NOTES
"Albertina returns to the office for reevaluation of the left foot.  The patient relates following the instructions given at the last visit with noted overall continued pain and minimal improvement in function of the left foot.   The patient relates no other problems.    Vitals: /56   Pulse 67   Ht 1.664 m (5' 5.5\")   Wt 87.1 kg (192 lb)   BMI 31.46 kg/m    BMI= Body mass index is 31.46 kg/m .    Lower Extremity Physical Exam:      Neurovascular status remains unchanged.  Muscular exam is within normal limits to major muscle groups.  Integument is intact.      Noted pain on palpation on the plantar aspect of the left heel near the insertion point of the plantar fascia.  No surrounding erythema or edema noted.    Diagnostics:  Radiograph evaluation including calcaneal lateral and axial views of the left foot reveals small calcaneal spurring.  No evidence of stress fracture.  I personally evaluated the images as well as reviewed the images with the patient pointing out the findings.      Assessment:     ICD-10-CM    1. Plantar fasciitis, left  M72.2 XR Calcaneus Left G/E 2 Views     methylPREDNISolone (MEDROL DOSEPAK) 4 MG tablet therapy pack     Physical Therapy Referral          Plan:    I have explained to Albertina about the progress of the conditions.  At this time, the patient was educated on the importance of offloading supportive shoes and other devices.  I demonstrated to the patient calf stretches to perform every hour daily until symptoms resolve.  After symptoms resolve, the patient was advised to perform the stretches 3 times daily to prevent future recurrence.  The patient was instructed to perform warm soaks with Epson salt after which to also apply over-the-counter Voltaren gel to deeply massage the injured tissue.  The patient was instructed to do this on a daily basis until symptoms resolve.   The patient was referred to physical therapy for deep tissue [place] fascial release to the plantar " fascia on the left.  To reduce the amount of current inflammation, the patient was prescribed a Medrol Dosepak to be taken daily with food and instructed to stop taking if any stomach irritation or swelling in extremities are noted.  The patient may return in four weeks for reevaluation to determine if any further treatment will be needed.      Albertina verbalized agreement with and understanding of the rational for the diagnosis and treatment plan.  All questions were answered to best of my ability and the patient's satisfaction. The patient was advised to contact the clinic with any questions that may arise after the clinic visit.      Disclaimer: This note consists of symbols derived from keyboarding, dictation and/or voice recognition software. As a result, there may be errors in the script that have gone undetected. Please consider this when interpreting information found in this chart.       RUFINO Marte D.P.M., F.[email or web address].F.A.S.

## 2023-03-03 NOTE — NURSING NOTE
"Chief Complaint   Patient presents with     RECHECK     Left foot- not getting any better- started biking legs goes numb       Initial /56   Pulse 67   Ht 1.664 m (5' 5.5\")   Wt 87.1 kg (192 lb)   BMI 31.46 kg/m   Estimated body mass index is 31.46 kg/m  as calculated from the following:    Height as of this encounter: 1.664 m (5' 5.5\").    Weight as of this encounter: 87.1 kg (192 lb).  Medications and allergies reviewed.      Jerica GARNER MA    "

## 2023-03-23 ENCOUNTER — VIRTUAL VISIT (OUTPATIENT)
Dept: PSYCHOLOGY | Facility: CLINIC | Age: 38
End: 2023-03-23
Payer: OTHER GOVERNMENT

## 2023-03-23 DIAGNOSIS — F41.1 GAD (GENERALIZED ANXIETY DISORDER): Primary | ICD-10-CM

## 2023-03-23 PROCEDURE — 90834 PSYTX W PT 45 MINUTES: CPT | Mod: 95
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
## 2023-03-31 ENCOUNTER — OFFICE VISIT (OUTPATIENT)
Dept: PODIATRY | Facility: CLINIC | Age: 38
End: 2023-03-31
Payer: OTHER GOVERNMENT

## 2023-03-31 ENCOUNTER — THERAPY VISIT (OUTPATIENT)
Dept: PHYSICAL THERAPY | Facility: CLINIC | Age: 38
End: 2023-03-31
Payer: OTHER GOVERNMENT

## 2023-03-31 VITALS
SYSTOLIC BLOOD PRESSURE: 124 MMHG | HEART RATE: 73 BPM | DIASTOLIC BLOOD PRESSURE: 86 MMHG | HEIGHT: 66 IN | WEIGHT: 192 LBS | BODY MASS INDEX: 30.86 KG/M2

## 2023-03-31 DIAGNOSIS — M79.672 LEFT FOOT PAIN: Primary | ICD-10-CM

## 2023-03-31 DIAGNOSIS — M72.2 PLANTAR FASCIITIS, LEFT: ICD-10-CM

## 2023-03-31 DIAGNOSIS — M72.2 PLANTAR FASCIITIS, LEFT: Primary | ICD-10-CM

## 2023-03-31 PROCEDURE — 97110 THERAPEUTIC EXERCISES: CPT | Mod: GP

## 2023-03-31 PROCEDURE — 97161 PT EVAL LOW COMPLEX 20 MIN: CPT | Mod: GP

## 2023-03-31 PROCEDURE — 97140 MANUAL THERAPY 1/> REGIONS: CPT | Mod: GP

## 2023-03-31 PROCEDURE — 99213 OFFICE O/P EST LOW 20 MIN: CPT | Performed by: PODIATRIST

## 2023-03-31 ASSESSMENT — PAIN SCALES - GENERAL: PAINLEVEL: SEVERE PAIN (6)

## 2023-03-31 NOTE — LETTER
March 31, 2023      Albertina Rojas  [address] [address] [address]        To Whom It May Concern:    Albertina Rojas was seen in our clinic. She may be able to participate in walking and jogging at her own pace and distance. She will be initiating physical therapy.  She will return in one month for reevaluation.      Sincerely,        Mason Marte DPM

## 2023-03-31 NOTE — NURSING NOTE
"Chief Complaint   Patient presents with     RECHECK     Left plantarfasciitis- still not getting better- got orthotics helps with walking for longer distant but then pain comes with rest       Initial /86   Pulse 73   Ht 1.664 m (5' 5.5\")   Wt 87.1 kg (192 lb)   BMI 31.46 kg/m   Estimated body mass index is 31.46 kg/m  as calculated from the following:    Height as of this encounter: 1.664 m (5' 5.5\").    Weight as of this encounter: 87.1 kg (192 lb).  Medications and allergies reviewed.      Jerica GARNER MA    "

## 2023-03-31 NOTE — LETTER
"    3/31/2023         RE: Albertina Rojas  [address] [address] [address]        Dear Colleague,    Thank you for referring your patient, Albertina Rojas, to the [facility] [place]. Please see a copy of my visit note below.    Albertina returns to the office for reevaluation of the left foot.  The patient relates following the instructions given at the last visit with noted overall less pain and more improvement in function of the left foot.   The patient relates no other problems.    Vitals: /86   Pulse 73   Ht 1.664 m (5' 5.5\")   Wt 87.1 kg (192 lb)   BMI 31.46 kg/m    BMI= Body mass index is 31.46 kg/m .    Lower Extremity Physical Exam:      Neurovascular status remains unchanged.  Muscular exam is within normal limits to major muscle groups.  Integument is intact.      Noted decreased pain on palpation under the left heel.  No surrounding erythema noted.         Assessment:     ICD-10-CM    1. Plantar fasciitis, left  M72.2           Plan:    I have explained to Albertina about the progress of the conditions.  At this time, the patient was encouraged to continue wearing offloading supportive shoes and other devices.  The patient was encouraged to perform calf stretches 3 times daily to prevent future recurrence.  The patient was instructed to continue to perform warm soaks with Epson salt after which to also apply over-the-counter Voltaren gel to deeply massage the injured tissue.   The patient will return in four weeks for reevaluation if problems persist to determine if any further treatment will be needed.      Albertina verbalized agreement with and understanding of the rational for the diagnosis and treatment plan.  All questions were answered to best of my ability and the patient's satisfaction. The patient was advised to contact the clinic with any questions that may arise after the clinic visit.      Disclaimer: This note consists of symbols derived from keyboarding, " dictation and/or voice recognition software. As a result, there may be errors in the script that have gone undetected. Please consider this when interpreting information found in this chart.       RUFINO Marte D.P.M., F.A.C.F.A.S.        Again, thank you for allowing me to participate in the care of your patient.        Sincerely,        Mason Marte DPM

## 2023-03-31 NOTE — PROGRESS NOTES
"Albertina returns to the office for reevaluation of the left foot.  The patient relates following the instructions given at the last visit with noted overall less pain and more improvement in function of the left foot.   The patient relates no other problems.    Vitals: /86   Pulse 73   Ht 1.664 m (5' 5.5\")   Wt 87.1 kg (192 lb)   BMI 31.46 kg/m    BMI= Body mass index is 31.46 kg/m .    Lower Extremity Physical Exam:      Neurovascular status remains unchanged.  Muscular exam is within normal limits to major muscle groups.  Integument is intact.      Noted decreased pain on palpation under the left heel.  No surrounding erythema noted.         Assessment:     ICD-10-CM    1. Plantar fasciitis, left  M72.2           Plan:    I have explained to Albertina about the progress of the conditions.  At this time, the patient was encouraged to continue wearing offloading supportive shoes and other devices.  The patient was encouraged to perform calf stretches 3 times daily to prevent future recurrence.  The patient was instructed to continue to perform warm soaks with Epson salt after which to also apply over-the-counter Voltaren gel to deeply massage the injured tissue.   The patient will return in four weeks for reevaluation if problems persist to determine if any further treatment will be needed.      Albertina verbalized agreement with and understanding of the rational for the diagnosis and treatment plan.  All questions were answered to best of my ability and the patient's satisfaction. The patient was advised to contact the clinic with any questions that may arise after the clinic visit.      Disclaimer: This note consists of symbols derived from keyboarding, dictation and/or voice recognition software. As a result, there may be errors in the script that have gone undetected. Please consider this when interpreting information found in this chart.       RUFINO Marte D.P.M., FSTAR.F.A.S.    "

## 2023-03-31 NOTE — PROGRESS NOTES
Physical Therapy Initial Evaluation    Therapist Assessment: Albertina Rojas is a 37 year old female patient presenting to Physical Therapy with L foot pain. Patient demonstrates antalgic gait (decreased stance time L), increased mid foot strike during ambulation L side, tenderness to palpation insertion of plantar fascia anterior/medial to heel, + calcaneal squeeze test L and normal ROM L ankle. Signs and symptoms are consistent with L plantar fasciopathy vs calcaneal pathology. These impairments limit their ability to walk for increased periods of time for work tasks and taking care of children at home, ambulating up and down stairs, and doing daily tasks that involve being on feet. Skilled PT services are necessary in order to reduce impairments and improve independent function.    Subjective:  The history is provided by the patient.   Therapist Generated HPI Evaluation  Problem details: Patient reports to outpatient physical therapy with L foot pain that started 5 years ago gradually but no known cause. She went to PT when this originally happened and had an Xray that showed a bone spur in her L heel. She had PT in the [employer] and has since continued some of her exercises. Since 5 years ago her pain has got way worse. She has seen several doctors that have given her calf stretches but these have not helped. Patient has the most pain right away in the morning. The pain, however, is still any time she walks/her heel hits the ground.  She tried a night splint and that did not help. She occasionally has a sharp stabbing pain when she is at rest. She has to walk more on her toes to get up stairs. 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 She has shoes with little balls on the bottom to help with plantar fascia and heat in the morning with showering both seem to help. She has custom orthotics that she currently wears as well that helps slightly. As she starts  walking the pain comes down slightly but then ramps back up getting close to a mile. She points to the heel where her pain. The outside of her L foot is occasionally painful that shoots across the top of the foot if she walks on it wrong.     Goals:  1) walk without pain (less than one mile currently or about 5 blocks)  2) stairs   3)  testing .         Type of problem:  Left foot.    This is a chronic condition.  Condition occurred with:  Insidious onset.  Where condition occurred: for unknown reasons.    Pain is described as sharp, stabbing and burning   Pain is worse during the day.  Since onset symptoms are gradually worsening.  Symptoms are exacerbated by walking, standing, ascending stairs and descending stairs  and relieved by heat (anti-inflammatory pack helped with the pain, but then as soon as the pack was done her pain came back).  Special tests included:  X-ray.  Previous treatment includes physical therapy.   Restrictions due to condition include:  Working in normal job with restrictions.  Home/work barriers: difficult to walk with kids (especially helping one who is learning how to ride bike without training wheels)    Patient Health History  Albertina Rojas being seen for Physical Therapy for left foot.     Problem began: 3/31/2018.   Problem occurred: Approxjmate date. Has been ongoing for about 5 years now. Unsure of what caused it.   Pain is reported as 6/10 on pain scale.  General health as reported by patient is good.  Pertinent medical history includes: none.     Medical allergies: none.   Surgeries include:  Other. 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    Current medications:  None and anti-inflammatory.    Current occupation is [employer].   Primary job tasks include:  Computer work, driving, lifting/carrying, prolonged sitting, prolonged standing, pushing/pulling and repetitive tasks.                  Patient Health History  Albertina SHARYN   being seen for Physical Therapy for left foot.     Problem began: 3/31/2018.   Problem occurred: Approxjmate date. Has been ongoing for about 5 years now. Unsure of what caused it.   Pain is reported as 6/10 on pain scale.  General health as reported by patient is good.  Pertinent medical history includes: none.     Medical allergies: none.   Surgeries include:  Other. 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    Current medications:  None and anti-inflammatory.    Current occupation is [employer].   Primary job tasks include:  Computer work, driving, lifting/carrying, prolonged sitting, prolonged standing, pushing/pulling and repetitive tasks.                                    Objective:    Gait:  Decreased stance time L LE and tendency to strike with mid foot on L side.  Gait Type:  Antalgic   Assistive Devices:  None            Ankle/Foot Evaluation  ROM:  Arom ankle eval: knee to wall: 10 cm L, 11 cm R.  AROM:      Plantarflexion:  Left:  WNL    Right:  WNL      Great toe flexion: Left:  WNL (no pain)     Right:         LIGAMENT TESTING: not assessed              SPECIAL TESTS: Special tests ankle: calcaneal squeeze: - R, + L     PALPATION:   Left ankle tenderness present at:  plantar fascia  Left ankle tenderness not present at:   gastroc/soleus or achilles tendon    Right ankle tenderness not present at:  gastroc/soleus; achilles tendon or plantar fascia  EDEMA: normal          MOBILITY TESTING:           Midtarsal Left: normal      First Ray Left: normal                                                        General     ROS    Assessment/Plan:    Patient is a 37 year old female with left side ankle complaints.    Patient has the following significant findings with corresponding treatment plan.                Diagnosis 1:  L foot pain  Pain -  hot/cold therapy, US, electric stimulation, mechanical traction, manual therapy, STS,  splint/taping/bracing/orthotics, self management, education, directional preference exercise and home program  Decreased ROM/flexibility - manual therapy, therapeutic exercise, therapeutic activity and home program  Decreased strength - therapeutic exercise, therapeutic activities and home program  Impaired gait - gait training, assistive devices and home program  Impaired muscle performance - neuro re-education and home program  Decreased function - therapeutic activities and home program    Therapy Evaluation Codes:     Cumulative Therapy Evaluation is: Low complexity.    Previous and current functional limitations:  (See Goal Flow Sheet for this information)    Short term and Long term goals: (See Goal Flow Sheet for this information)     Communication ability:  Patient appears to be able to clearly communicate and understand verbal and written communication and follow directions correctly.  Treatment Explanation - The following has been discussed with the patient:   RX ordered/plan of care  Anticipated outcomes  Possible risks and side effects  This patient would benefit from PT intervention to resume normal activities.   Rehab potential is good.    Frequency:  1 X week, once daily progressing to 1 x every other week  Duration:  for 2-3 months  Discharge Plan:  Achieve all LTG.  Independent in home treatment program.  Reach maximal therapeutic benefit.    Please refer to the daily flowsheet for treatment today, total treatment time and time spent performing 1:1 timed codes.

## 2023-03-31 NOTE — PATIENT INSTRUCTIONS

## 2023-04-07 ENCOUNTER — TELEPHONE (OUTPATIENT)
Dept: PODIATRY | Facility: CLINIC | Age: 38
End: 2023-04-07
Payer: OTHER GOVERNMENT

## 2023-04-07 DIAGNOSIS — M72.2 PLANTAR FASCIITIS, LEFT: ICD-10-CM

## 2023-04-07 RX ORDER — MELOXICAM 15 MG/1
15 TABLET ORAL DAILY
Qty: 30 TABLET | Refills: 1 | Status: SHIPPED | OUTPATIENT
Start: 2023-04-07 | End: 2023-04-28

## 2023-04-07 NOTE — TELEPHONE ENCOUNTER
Please notify the patient that a prescribed refill for Mobic was approved and sent to their pharmacy.  The patient will need to return to clinic for reevaluation if symptoms persist.

## 2023-04-10 ENCOUNTER — VIRTUAL VISIT (OUTPATIENT)
Dept: PSYCHOLOGY | Facility: CLINIC | Age: 38
End: 2023-04-10
Payer: OTHER GOVERNMENT

## 2023-04-10 DIAGNOSIS — F41.1 GAD (GENERALIZED ANXIETY DISORDER): Primary | ICD-10-CM

## 2023-04-10 PROCEDURE — 90834 PSYTX W PT 45 MINUTES: CPT | Mod: 95
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
## 2023-04-11 ENCOUNTER — THERAPY VISIT (OUTPATIENT)
Dept: PHYSICAL THERAPY | Facility: CLINIC | Age: 38
End: 2023-04-11
Payer: OTHER GOVERNMENT

## 2023-04-11 DIAGNOSIS — M79.672 LEFT FOOT PAIN: Primary | ICD-10-CM

## 2023-04-11 PROCEDURE — 97110 THERAPEUTIC EXERCISES: CPT | Mod: GP | Performed by: PHYSICAL THERAPIST

## 2023-04-11 PROCEDURE — 97530 THERAPEUTIC ACTIVITIES: CPT | Mod: GP | Performed by: PHYSICAL THERAPIST

## 2023-04-11 PROCEDURE — 97140 MANUAL THERAPY 1/> REGIONS: CPT | Mod: GP | Performed by: PHYSICAL THERAPIST

## 2023-04-18 ENCOUNTER — THERAPY VISIT (OUTPATIENT)
Dept: PHYSICAL THERAPY | Facility: CLINIC | Age: 38
End: 2023-04-18
Payer: OTHER GOVERNMENT

## 2023-04-18 DIAGNOSIS — M79.672 LEFT FOOT PAIN: Primary | ICD-10-CM

## 2023-04-18 PROCEDURE — 97110 THERAPEUTIC EXERCISES: CPT | Mod: GP | Performed by: PHYSICAL THERAPIST

## 2023-04-18 PROCEDURE — 97530 THERAPEUTIC ACTIVITIES: CPT | Mod: GP | Performed by: PHYSICAL THERAPIST

## 2023-04-18 PROCEDURE — 97140 MANUAL THERAPY 1/> REGIONS: CPT | Mod: GP | Performed by: PHYSICAL THERAPIST

## 2023-04-25 ENCOUNTER — THERAPY VISIT (OUTPATIENT)
Dept: PHYSICAL THERAPY | Facility: CLINIC | Age: 38
End: 2023-04-25
Payer: OTHER GOVERNMENT

## 2023-04-25 ENCOUNTER — VIRTUAL VISIT (OUTPATIENT)
Dept: PSYCHOLOGY | Facility: CLINIC | Age: 38
End: 2023-04-25
Payer: OTHER GOVERNMENT

## 2023-04-25 DIAGNOSIS — F41.1 GAD (GENERALIZED ANXIETY DISORDER): Primary | ICD-10-CM

## 2023-04-25 DIAGNOSIS — M79.672 LEFT FOOT PAIN: Primary | ICD-10-CM

## 2023-04-25 PROCEDURE — 97140 MANUAL THERAPY 1/> REGIONS: CPT | Mod: GP | Performed by: PHYSICAL THERAPIST

## 2023-04-25 PROCEDURE — 97110 THERAPEUTIC EXERCISES: CPT | Mod: GP | Performed by: PHYSICAL THERAPIST

## 2023-04-25 PROCEDURE — 90834 PSYTX W PT 45 MINUTES: CPT | Mod: 95

## 2023-04-25 PROCEDURE — 97112 NEUROMUSCULAR REEDUCATION: CPT | Mod: GP | Performed by: PHYSICAL THERAPIST
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
## 2023-04-28 ENCOUNTER — OFFICE VISIT (OUTPATIENT)
Dept: PODIATRY | Facility: CLINIC | Age: 38
End: 2023-04-28
Payer: OTHER GOVERNMENT

## 2023-04-28 VITALS
BODY MASS INDEX: 30.86 KG/M2 | WEIGHT: 192 LBS | HEIGHT: 66 IN | DIASTOLIC BLOOD PRESSURE: 72 MMHG | SYSTOLIC BLOOD PRESSURE: 108 MMHG | HEART RATE: 67 BPM

## 2023-04-28 DIAGNOSIS — M72.2 PLANTAR FASCIITIS, LEFT: Primary | ICD-10-CM

## 2023-04-28 PROCEDURE — 99213 OFFICE O/P EST LOW 20 MIN: CPT | Performed by: PODIATRIST

## 2023-04-28 ASSESSMENT — PAIN SCALES - GENERAL: PAINLEVEL: EXTREME PAIN (8)

## 2023-04-28 NOTE — LETTER
"    4/28/2023         RE: Albertina Rojas  [id number] [address] [address]        Dear Colleague,    Thank you for referring your patient, Albertina Rojas, to the [facility] [place]. Please see a copy of my visit note below.    Albertina returns to the office for reevaluation of the left foot.  The patient relates following the instructions given at the last visit with noted overall less pain and more improvement in function of the left foot.   The patient relates no other problems.    Vitals: /72   Pulse 67   Ht 1.664 m (5' 5.5\")   Wt 87.1 kg (192 lb)   BMI 31.46 kg/m    BMI= Body mass index is 31.46 kg/m .    Lower Extremity Physical Exam:      Neurovascular status remains unchanged.  Muscular exam is within normal limits to major muscle groups.  Integument is intact.      Noted decreased pain on palpation under the left heel.  No surrounding erythema noted.         Assessment:     ICD-10-CM    1. Plantar fasciitis, left  M72.2           Plan:    I have explained to Albertina about the progress of the conditions.  At this time, the patient was encouraged to continue wearing offloading supportive shoes and other devices.  The patient was encouraged to perform calf stretches 3 times daily to prevent future recurrence.  The patient was instructed to continue to perform warm soaks with Epson salt after which to also apply over-the-counter Voltaren gel to deeply massage the injured tissue. The patient will continue with physical therapy for deep tissue facial release of the plantar fascia on the left.  The patient will return in four weeks for reevaluation if problems persist to determine if any further treatment will be needed.    Albertina verbalized agreement with and understanding of the rational for the diagnosis and treatment plan.  All questions were answered to best of my ability and the patient's satisfaction. The patient was advised to contact the clinic with any questions that " may arise after the clinic visit.      Disclaimer: This note consists of symbols derived from keyboarding, dictation and/or voice recognition software. As a result, there may be errors in the script that have gone undetected. Please consider this when interpreting information found in this chart.       RUFINO Marte D.P.M., F.[email or web address].[email or web address].        Again, thank you for allowing me to participate in the care of your patient.        Sincerely,        Mason Marte DPM

## 2023-04-28 NOTE — PATIENT INSTRUCTIONS

## 2023-04-28 NOTE — PROGRESS NOTES
"Albertina returns to the office for reevaluation of the left foot.  The patient relates following the instructions given at the last visit with noted overall less pain and more improvement in function of the left foot.   The patient relates no other problems.    Vitals: /72   Pulse 67   Ht 1.664 m (5' 5.5\")   Wt 87.1 kg (192 lb)   BMI 31.46 kg/m    BMI= Body mass index is 31.46 kg/m .    Lower Extremity Physical Exam:      Neurovascular status remains unchanged.  Muscular exam is within normal limits to major muscle groups.  Integument is intact.      Noted decreased pain on palpation under the left heel.  No surrounding erythema noted.         Assessment:     ICD-10-CM    1. Plantar fasciitis, left  M72.2           Plan:    I have explained to Albertina about the progress of the conditions.  At this time, the patient was encouraged to continue wearing offloading supportive shoes and other devices.  The patient was encouraged to perform calf stretches 3 times daily to prevent future recurrence.  The patient was instructed to continue to perform warm soaks with Epson salt after which to also apply over-the-counter Voltaren gel to deeply massage the injured tissue. The patient will continue with physical therapy for deep tissue facial release of the plantar fascia on the left.  The patient will return in four weeks for reevaluation if problems persist to determine if any further treatment will be needed.    Albertina verbalized agreement with and understanding of the rational for the diagnosis and treatment plan.  All questions were answered to best of my ability and the patient's satisfaction. The patient was advised to contact the clinic with any questions that may arise after the clinic visit.      Disclaimer: This note consists of symbols derived from keyboarding, dictation and/or voice recognition software. As a result, there may be errors in the script that have gone undetected. Please consider this when " interpreting information found in this chart.       RUFINO Marte D.P.M., [email or web address].

## 2023-05-01 ENCOUNTER — THERAPY VISIT (OUTPATIENT)
Dept: PHYSICAL THERAPY | Facility: CLINIC | Age: 38
End: 2023-05-01
Payer: OTHER GOVERNMENT

## 2023-05-01 ENCOUNTER — MYC MEDICAL ADVICE (OUTPATIENT)
Dept: FAMILY MEDICINE | Facility: CLINIC | Age: 38
End: 2023-05-01

## 2023-05-01 DIAGNOSIS — M79.672 LEFT FOOT PAIN: Primary | ICD-10-CM

## 2023-05-01 PROCEDURE — 97530 THERAPEUTIC ACTIVITIES: CPT | Mod: GP | Performed by: PHYSICAL THERAPIST

## 2023-05-08 ENCOUNTER — THERAPY VISIT (OUTPATIENT)
Dept: PHYSICAL THERAPY | Facility: CLINIC | Age: 38
End: 2023-05-08
Payer: OTHER GOVERNMENT

## 2023-05-08 DIAGNOSIS — M79.672 LEFT FOOT PAIN: Primary | ICD-10-CM

## 2023-05-08 PROCEDURE — 97035 APP MDLTY 1+ULTRASOUND EA 15: CPT | Mod: GP | Performed by: PHYSICAL THERAPIST

## 2023-05-08 PROCEDURE — 97140 MANUAL THERAPY 1/> REGIONS: CPT | Mod: GP | Performed by: PHYSICAL THERAPIST

## 2023-05-08 PROCEDURE — 97110 THERAPEUTIC EXERCISES: CPT | Mod: GP | Performed by: PHYSICAL THERAPIST

## 2023-05-12 ENCOUNTER — TELEPHONE (OUTPATIENT)
Dept: PODIATRY | Facility: CLINIC | Age: 38
End: 2023-05-12
Payer: OTHER GOVERNMENT

## 2023-05-12 DIAGNOSIS — M72.2 PLANTAR FASCIITIS, LEFT: Primary | ICD-10-CM

## 2023-05-14 ENCOUNTER — HEALTH MAINTENANCE LETTER (OUTPATIENT)
Age: 38
End: 2023-05-14

## 2023-05-15 ENCOUNTER — THERAPY VISIT (OUTPATIENT)
Dept: PHYSICAL THERAPY | Facility: CLINIC | Age: 38
End: 2023-05-15
Payer: OTHER GOVERNMENT

## 2023-05-15 DIAGNOSIS — M79.672 LEFT FOOT PAIN: Primary | ICD-10-CM

## 2023-05-15 PROCEDURE — 97110 THERAPEUTIC EXERCISES: CPT | Mod: GP | Performed by: PHYSICAL THERAPIST

## 2023-05-15 PROCEDURE — 97112 NEUROMUSCULAR REEDUCATION: CPT | Mod: GP | Performed by: PHYSICAL THERAPIST

## 2023-05-15 PROCEDURE — 97140 MANUAL THERAPY 1/> REGIONS: CPT | Mod: GP | Performed by: PHYSICAL THERAPIST

## 2023-05-16 ENCOUNTER — VIRTUAL VISIT (OUTPATIENT)
Dept: PSYCHOLOGY | Facility: CLINIC | Age: 38
End: 2023-05-16
Payer: OTHER GOVERNMENT

## 2023-05-16 DIAGNOSIS — F41.1 GAD (GENERALIZED ANXIETY DISORDER): Primary | ICD-10-CM

## 2023-05-16 PROCEDURE — 90834 PSYTX W PT 45 MINUTES: CPT | Mod: 95
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
## 2023-05-18 ENCOUNTER — TRANSFERRED RECORDS (OUTPATIENT)
Dept: HEALTH INFORMATION MANAGEMENT | Facility: CLINIC | Age: 38
End: 2023-05-18
Payer: OTHER GOVERNMENT

## 2023-05-22 ENCOUNTER — THERAPY VISIT (OUTPATIENT)
Dept: PHYSICAL THERAPY | Facility: CLINIC | Age: 38
End: 2023-05-22
Payer: OTHER GOVERNMENT

## 2023-05-22 DIAGNOSIS — M79.672 LEFT FOOT PAIN: Primary | ICD-10-CM

## 2023-05-22 PROCEDURE — 97110 THERAPEUTIC EXERCISES: CPT | Mod: GP | Performed by: PHYSICAL THERAPIST

## 2023-05-22 PROCEDURE — 97530 THERAPEUTIC ACTIVITIES: CPT | Mod: GP | Performed by: PHYSICAL THERAPIST

## 2023-05-22 NOTE — PROGRESS NOTES
05/22/23 0500   Appointment Info   Signing clinician's name / credentials Iram Alvarez DPT   Total/Authorized Visits 10   Visits Used 8   Medical Diagnosis L foot pain   Other pertinent information No progress to be discharged at this time   Progress Note/Certification   Therapy Frequency 1x/wk progressing to 1 x every other week   PT Goal 1   Goal Identifier Ambulation   Goal Description Pt to be able to walk 15-20 min with pain <3/10   Rationale to maximize safety and independence with performance of ADLs and functional tasks   Goal Progress no change, to hold skilled PT at this time   Target Date 06/09/23   Subjective Report   Subjective Report I did see the MD and I am considering surgical intervention late June.  Pain in the lef tknee as well.   No signficant change in function.   Objective Measures   Objective Measures Objective Measure 1;Objective Measure 2   Objective Measure 1   Objective Measure Posture   Details Poor sit to stand mechanics, gait.  Antalgic   Objective Measure 2   Objective Measure Strength:   Details MMT hip ER R 4+/5, L 5-/5; hip abd 5-/5 B; lower abdominal 3/t, difficulty with spine stabilizaiton lowering.  PF/inv   Ultrasound   Ultrasound -Type (does not include 3-5 min prep/cleanup time) Continuous   Treatment Interventions (PT)   Interventions Therapeutic Procedure/Exercise;Therapeutic Activity   Therapeutic Procedure/Exercise   PTRx Ther Proc 1 Theraband Ankle Inversion   PTRx Ther Proc 1 - Details continue at high reps   PTRx Ther Proc 2 Strength testing   PTRx Ther Proc 2 - Details reviewed   PTRx Ther Proc 3 Hip ER   PTRx Ther Proc 3 - Details Continue with firehydrant all 4   PTRx Ther Proc 6 Supine Abdominal Exercise #4 (Leg Extension)   PTRx Ther Proc 6 - Details poor control, continued with hand placement and lumbar stability   Therapeutic Procedures: strength, endurance, ROM, flexibillity minutes (40672) 20   Ther Proc 1 Discussion on level of exercise management of  britt   Ther Proc 1 - Details x10 min   Therapeutic Activity   Therapeutic Activities: dynamic activities to improve functional performance minutes (09035) 10   Ther Act 1 Discussion on MD appt, taping and overall level of function   Ther Act 1 - Details trial of kinesiotape for posterior tibial tendon syndrome and planned vacation   Skilled Intervention Discussion on role of PT and overall plan   Total Session Time   Timed Code Treatment Minutes 30   Total Treatment Time (sum of timed and untimed services) 30         DISCHARGE  Reason for Discharge: pt continuing to have pain despite compliance and intervention.  Pt to return to MD on Friday; 5/26/2023    Equipment Issued: trial of heel lifts, pt with orthotics.      Discharge Plan: Patient to continue home program.    Referring Provider:  Mason Marte

## 2023-05-25 ENCOUNTER — OFFICE VISIT (OUTPATIENT)
Dept: FAMILY MEDICINE | Facility: CLINIC | Age: 38
End: 2023-05-25
Payer: OTHER GOVERNMENT

## 2023-05-25 VITALS
BODY MASS INDEX: 30.37 KG/M2 | TEMPERATURE: 99 F | HEART RATE: 76 BPM | RESPIRATION RATE: 16 BRPM | SYSTOLIC BLOOD PRESSURE: 126 MMHG | WEIGHT: 189 LBS | DIASTOLIC BLOOD PRESSURE: 82 MMHG | OXYGEN SATURATION: 99 % | HEIGHT: 66 IN

## 2023-05-25 DIAGNOSIS — Z00.00 ROUTINE GENERAL MEDICAL EXAMINATION AT A HEALTH CARE FACILITY: Primary | ICD-10-CM

## 2023-05-25 PROBLEM — Z87.42 H/O ABNORMAL CERVICAL PAPANICOLAOU SMEAR: Status: ACTIVE | Noted: 2020-02-03

## 2023-05-25 PROCEDURE — 36415 COLL VENOUS BLD VENIPUNCTURE: CPT | Performed by: PHYSICIAN ASSISTANT

## 2023-05-25 PROCEDURE — 86803 HEPATITIS C AB TEST: CPT | Performed by: PHYSICIAN ASSISTANT

## 2023-05-25 PROCEDURE — 87624 HPV HI-RISK TYP POOLED RSLT: CPT | Performed by: PHYSICIAN ASSISTANT

## 2023-05-25 PROCEDURE — G0145 SCR C/V CYTO,THINLAYER,RESCR: HCPCS | Performed by: PHYSICIAN ASSISTANT

## 2023-05-25 PROCEDURE — 99395 PREV VISIT EST AGE 18-39: CPT | Performed by: PHYSICIAN ASSISTANT

## 2023-05-25 ASSESSMENT — ENCOUNTER SYMPTOMS
HEADACHES: 0
FEVER: 0
ARTHRALGIAS: 0
ABDOMINAL PAIN: 0
DIARRHEA: 1
CHILLS: 0
SORE THROAT: 0
JOINT SWELLING: 0
PARESTHESIAS: 0
NERVOUS/ANXIOUS: 0
FREQUENCY: 0
EYE PAIN: 0
HEMATOCHEZIA: 0
HEARTBURN: 0
DYSURIA: 0
COUGH: 0
PALPITATIONS: 0
DIZZINESS: 0
MYALGIAS: 0
WEAKNESS: 0
HEMATURIA: 0
CONSTIPATION: 0
NAUSEA: 0
SHORTNESS OF BREATH: 0
BREAST MASS: 0

## 2023-05-25 ASSESSMENT — PAIN SCALES - GENERAL: PAINLEVEL: NO PAIN (0)

## 2023-05-25 NOTE — PROGRESS NOTES
SUBJECTIVE:   CC: Albertina is an 37 year old who presents for preventive health visit.       5/25/2023    11:05 AM   Additional Questions   Roomed by Tristan MENDEZ LPN   Accompanied by Self         5/25/2023    11:05 AM   Patient Reported Additional Medications   Patient reports taking the following new medications None   Patient has been advised of split billing requirements and indicates understanding: Yes  Healthy Habits:    Getting at least 3 servings of Calcium per day:  NO    Bi-annual eye exam:  Yes    Dental care twice a year:  Yes    Sleep apnea or symptoms of sleep apnea:  None    Diet:  Regular (no restrictions)    Frequency of exercise:  None    Taking medications regularly:  Yes    Medication side effects:  Not applicable    PHQ-2 Total Score:    Additional concerns today:  Yes                  Have you ever done Advance Care Planning? (For example, a Health Directive, POLST, or a discussion with a medical provider or your loved ones about your wishes): No, advance care planning information given to patient to review.  Patient declined advance care planning discussion at this time.    Social History     Tobacco Use     Smoking status: Never     Passive exposure: Never     Smokeless tobacco: Never     Tobacco comments:     Never smoked   Vaping Use     Vaping status: Never Used   Substance Use Topics     Alcohol use: Yes     Comment: Drink socially, no more than 1-2 drinks in a single sitting             5/25/2023     7:52 AM   Alcohol Use   Prescreen: >3 drinks/day or >7 drinks/week? No     Reviewed orders with patient.  Reviewed health maintenance and updated orders accordingly - Yes  BP Readings from Last 3 Encounters:   05/25/23 126/82   04/28/23 108/72   03/31/23 124/86    Wt Readings from Last 3 Encounters:   05/25/23 85.7 kg (189 lb)   04/28/23 87.1 kg (192 lb)   03/31/23 87.1 kg (192 lb)                  Patient Active Problem List   Diagnosis     Cervical neuritis     Joint Pain, Localized In The  Wrist     AMELIA (generalized anxiety disorder)     H/O abnormal cervical Papanicolaou smear     Past Surgical History:   Procedure Laterality Date     BIOPSY  2019    Biopsy of tumor in left forearm before surgery     EYE SURGERY  2006    Lasik corrective surgery     ORTHOPEDIC SURGERY  2020    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     Allergies   Allergen Reactions     Seasonal Allergies Other (See Comments)     Drainage in back of throat, runny nose     Recent Labs   Lab Test 16  1239   LDL 94   HDL 51   TRIG 47        Breast Cancer Screenin/25/2023     7:53 AM   Breast CA Risk Assessment (FHS-7)   Do you have a family history of breast, colon, or ovarian cancer? No / Unknown         Patient under 40 years of age: Routine Mammogram Screening not recommended.   Pertinent mammograms are reviewed under the imaging tab.    History of abnormal Pap smear: NO - age 30-65 PAP every 5 years with negative HPV co-testing recommended  Last 3 Pap and HPV Results:       2016    12:00 PM   PAP / HPV   PAP Negative for squamous intraepithelial lesion or malignancy  Electronically signed by Shi Calderon CT (ASCP) on 2/10/2016 at 12:10 PM             2016    12:00 PM   PAP / HPV   PAP Negative for squamous intraepithelial lesion or malignancy  Electronically signed by Shi Calderon CT (ASCP) on 2/10/2016 at 12:10 PM         Reviewed  and updated as needed this visit by clinical staff   Tobacco  Allergies  Meds  Problems  Med Hx  Surg Hx  Fam Hx          Reviewed and updated as needed this visit by Provider   Tobacco  Allergies  Meds  Problems  Med Hx  Surg Hx  Fam Hx         History reviewed. No pertinent past medical history.   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   HENT: Negative for congestion, ear pain, hearing loss and sore throat.    Eyes: Negative for pain and visual disturbance.   Respiratory: Negative for cough and shortness of breath.    Cardiovascular: Negative for chest pain, palpitations and peripheral edema.   Gastrointestinal: Positive for diarrhea. Negative for abdominal pain, constipation, heartburn, hematochezia and nausea.   Breasts:  Positive for tenderness. Negative for breast mass and discharge.   Genitourinary: Positive for vaginal bleeding and vaginal discharge. Negative for dysuria, frequency, genital sores, hematuria, pelvic pain and urgency.   Musculoskeletal: Negative for arthralgias, joint swelling and myalgias.   Skin: Negative for rash.   Neurological: Negative for dizziness, weakness, headaches and paresthesias.   Psychiatric/Behavioral: Negative for mood changes. The patient is not nervous/anxious.      She has a  "paraguard IUD, currently has her menstrual cycle, started today     OBJECTIVE:   /82   Pulse 76   Temp 99  F (37.2  C) (Tympanic)   Resp 16   Ht 1.664 m (5' 5.5\")   Wt 85.7 kg (189 lb)   LMP 05/23/2023 (Exact Date)   SpO2 99%   BMI 30.97 kg/m    Physical Exam  GENERAL: healthy, alert and no distress  EYES: Eyes grossly normal to inspection, PERRL and conjunctivae and sclerae normal  HENT: ear canals and TM's normal, nose and mouth without ulcers or lesions  NECK: no adenopathy, no asymmetry, masses, or scars and thyroid normal to palpation  RESP: lungs clear to auscultation - no rales, rhonchi or wheezes  BREAST: normal without masses, tenderness or nipple discharge and no palpable axillary masses or adenopathy  CV: regular rate and rhythm, normal S1 S2, no S3 or S4, no murmur, click or rub, no peripheral edema and peripheral pulses strong  ABDOMEN: soft, nontender, no hepatosplenomegaly, no masses and bowel sounds normal   (female): normal female external genitalia, normal urethral meatus, vaginal mucosa pink, moist, well rugated, and normal cervix/adnexa/uterus without masses, IUD strings visualized, blood present  MS: no gross musculoskeletal defects noted, no edema  SKIN: no suspicious lesions or rashes  NEURO: Normal strength and tone, mentation intact and speech normal  PSYCH: mentation appears normal, affect normal/bright    Diagnostic Test Results:  Labs reviewed in Epic    ASSESSMENT/PLAN:   (Z00.00) Routine general medical examination at a health care facility  (primary encounter diagnosis)  Comment: Health maintenance reviewed and updated.  Plan: Hepatitis C Screen Reflex to HCV RNA Quant and         Genotype, Pap Screen with HPV - recommended age        30 - 65 years              Patient has been advised of split billing requirements and indicates understanding: Yes      COUNSELING:  Reviewed preventive health counseling, as reflected in patient instructions       Regular exercise       " "Healthy diet/nutrition      BMI:   Estimated body mass index is 30.97 kg/m  as calculated from the following:    Height as of this encounter: 1.664 m (5' 5.5\").    Weight as of this encounter: 85.7 kg (189 lb).   Weight management plan: Discussed healthy diet and exercise guidelines      She reports that she has never smoked. She has never been exposed to tobacco smoke. She has never used smokeless tobacco.      Kristen M. Kehr, PA-C [facility]  "

## 2023-05-26 LAB — HCV AB SERPL QL IA: NONREACTIVE

## 2023-05-30 LAB
BKR LAB AP GYN ADEQUACY: NORMAL
BKR LAB AP GYN INTERPRETATION: NORMAL
BKR LAB AP HPV REFLEX: NORMAL
BKR LAB AP PREVIOUS ABNORMAL: NORMAL
PATH REPORT.COMMENTS IMP SPEC: NORMAL
PATH REPORT.COMMENTS IMP SPEC: NORMAL
PATH REPORT.RELEVANT HX SPEC: NORMAL

## 2023-06-01 LAB
HUMAN PAPILLOMA VIRUS 16 DNA: NEGATIVE
HUMAN PAPILLOMA VIRUS 18 DNA: NEGATIVE
HUMAN PAPILLOMA VIRUS FINAL DIAGNOSIS: NORMAL
HUMAN PAPILLOMA VIRUS OTHER HR: NEGATIVE

## 2023-06-13 ENCOUNTER — VIRTUAL VISIT (OUTPATIENT)
Dept: PSYCHOLOGY | Facility: CLINIC | Age: 38
End: 2023-06-13
Payer: OTHER GOVERNMENT

## 2023-06-13 DIAGNOSIS — F41.1 GAD (GENERALIZED ANXIETY DISORDER): Primary | ICD-10-CM

## 2023-06-13 PROCEDURE — 90834 PSYTX W PT 45 MINUTES: CPT | Mod: 95
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
## 2023-06-15 NOTE — PATIENT INSTRUCTIONS
For informational purposes only. Not to replace the advice of your health care provider. Copyright   2003,  [facility]. All rights reserved. Clinically reviewed by Sveta Gonzáles MD. [employer] [id number] - REV .  Preparing for Your Surgery  Getting started  A nurse will call you to review your health history and instructions. They will give you an arrival time based on your scheduled surgery time. Please be ready to share:  Your doctor's clinic name and phone number  Your medical, surgical, and anesthesia history  A list of allergies and sensitivities  A list of medicines, including herbal treatments and over-the-counter drugs  Whether the patient has a legal guardian (ask how to send us the papers in advance)  Please tell us if you're pregnant--or if there's any chance you might be pregnant. Some surgeries may injure a fetus (unborn baby), so they require a pregnancy test. Surgeries that are safe for a fetus don't always need a test, and you can choose whether to have one.   If you have a child who's having surgery, please ask for a copy of Preparing for Your Child's Surgery.    Preparing for surgery  Within 10 to 30 days of surgery: Have a pre-op exam (sometimes called an H&P, or History and Physical). This can be done at a clinic or pre-operative center.  If you're having a , you may not need this exam. Talk to your care team.  At your pre-op exam, talk to your care team about all medicines you take. If you need to stop any medicines before surgery, ask when to start taking them again.  We do this for your safety. Many medicines can make you bleed too much during surgery. Some change how well surgery (anesthesia) drugs work.  Call your insurance company to let them know you're having surgery. (If you don't have insurance, call [phone].)  Call your clinic if there's any change in your health. This includes signs of a cold or flu (sore throat, runny nose, cough, rash, fever). It  also includes a scrape or scratch near the surgery site.  If you have questions on the day of surgery, call your hospital or surgery center.  Eating and drinking guidelines  For your safety: Unless your surgeon tells you otherwise, follow the guidelines below.  Eat and drink as usual until 8 hours before you arrive for surgery. After that, no food or milk.  Drink clear liquids until 2 hours before you arrive. These are liquids you can see through, like water, Gatorade, and Propel Water. They also include plain black coffee and tea (no cream or milk), candy, and breath mints. You can spit out gum when you arrive.  If you drink alcohol: Stop drinking it the night before surgery.  If your care team tells you to take medicine on the morning of surgery, it's okay to take it with a sip of water.  Preventing infection  Shower or bathe the night before and morning of your surgery. Follow the instructions your clinic gave you. (If no instructions, use regular soap.)  Don't shave or clip hair near your surgery site. We'll remove the hair if needed.  Don't smoke or vape the morning of surgery. You may chew nicotine gum up to 2 hours before surgery. A nicotine patch is okay.  Note: Some surgeries require you to completely quit smoking and nicotine. Check with your surgeon.  Your care team will make every effort to keep you safe from infection. We will:  Clean our hands often with soap and water (or an alcohol-based hand rub).  Clean the skin at your surgery site with a special soap that kills germs.  Give you a special gown to keep you warm. (Cold raises the risk of infection.)  Wear special hair covers, masks, gowns and gloves during surgery.  Give antibiotic medicine, if prescribed. Not all surgeries need antibiotics.  What to bring on the day of surgery  Photo ID and insurance card  Copy of your health care directive, if you have one  Glasses and hearing aids (bring cases)  You can't wear contacts during surgery  Inhaler and  eye drops, if you use them (tell us about these when you arrive)  CPAP machine or breathing device, if you use them  A few personal items, if spending the night  If you have . . .  A pacemaker, ICD (cardiac defibrillator) or other implant: Bring the ID card.  An implanted stimulator: Bring the remote control.  A legal guardian: Bring a copy of the certified (court-stamped) guardianship papers.  Please remove any jewelry, including body piercings. Leave jewelry and other valuables at home.  If you're going home the day of surgery  You must have a responsible adult drive you home. They should stay with you overnight as well.  If you don't have someone to stay with you, and you aren't safe to go home alone, we may keep you overnight. Insurance often won't pay for this.  After surgery  If it's hard to control your pain or you need more pain medicine, please call your surgeon's office.  Questions?   If you have any questions for your care team, list them here: _________________________________________________________________________________________________________________________________________________________________________ ____________________________________ ____________________________________ ____________________________________    How to Take Your Medication Before Surgery

## 2023-06-15 NOTE — PROGRESS NOTES
[facility]  [address] [facility] [address]  Phone: [phone]  Primary Provider: Kehr, Kristen M  Pre-op Performing Provider: WALTER BRASHER      PREOPERATIVE EVALUATION:  Today's date: 6/22/2023    Albertina Rojas is a 37 year old female who presents for a preoperative evaluation.      5/25/2023    11:05 AM   Additional Questions   Roomed by Tristan MENDEZ LPN   Accompanied by Self         Surgical Information:  Surgery/Procedure: Plantar fascitis surgery  Surgery Location: [facility]  Surgeon: Chan Ludwig  Surgery Date: 06/27/2023  Time of Surgery: 9:15am  Where patient plans to recover: At home with family  Fax number for surgical facility: [phone]        Assessment & Plan     The proposed surgical procedure is considered LOW risk.    Preop general physical exam  Plantar fascia repair   - Basic metabolic panel  (Ca, Cl, CO2, Creat, Gluc, K, Na, BUN); Future  - Hemoglobin; Future  - Basic metabolic panel  (Ca, Cl, CO2, Creat, Gluc, K, Na, BUN)  - Hemoglobin    Plantar fascia syndrome  Ongoing x 5 years, tried PT many times with failure and have difficulty walking now without electric scooter assistance              - No identified additional risk factors other than previously addressed    Antiplatelet or Anticoagulation Medication Instructions:   - Patient is on no antiplatelet or anticoagulation medications.    Additional Medication Instructions:  Patient is on no additional chronic medications    RECOMMENDATION:  APPROVAL GIVEN to proceed with proposed procedure, without further diagnostic evaluation.              Subjective       HPI related to upcoming procedure: Plantar fascia surgery for pain control          6/21/2023     3:43 PM   Preop Questions   1. Have you ever had a heart attack or stroke? No   2. Have you ever had surgery on your heart or blood vessels, such as a stent placement, a coronary artery bypass, or surgery on an artery in your head, neck, heart, or legs? No    3. Do you have chest pain with activity? No   4. Do you have a history of  heart failure? No   5. Do you currently have a cold, bronchitis or symptoms of other infection? No   6. Do you have a cough, shortness of breath, or wheezing? No   7. Do you or anyone in your family have previous history of blood clots? No   8. Do you or does anyone in your family have a serious bleeding problem such as prolonged bleeding following surgeries or cuts? No   9. Have you ever had problems with anemia or been told to take iron pills? No   10. Have you had any abnormal blood loss such as black, tarry or bloody stools, or abnormal vaginal bleeding? No   11. Have you ever had a blood transfusion? No   12. Are you willing to have a blood transfusion if it is medically needed before, during, or after your surgery? Yes   13. Have you or any of your relatives ever had problems with anesthesia? No   14. Do you have sleep apnea, excessive snoring or daytime drowsiness? No   15. Do you have any artifical heart valves or other implanted medical devices like a pacemaker, defibrillator, or continuous glucose monitor? No   16. Do you have artificial joints? No   17. Are you allergic to latex? No   18. Is there any chance that you may be pregnant? No     Health Care Directive:  Patient does not have a Health Care Directive or Living Will: Discussed advance care planning with patient; however, patient declined at this time.    Preoperative Review of :   reviewed - no record of controlled substances prescribed.          Review of Systems  CONSTITUTIONAL: NEGATIVE for fever, chills, change in weight  INTEGUMENTARY/SKIN: NEGATIVE for worrisome rashes, moles or lesions  EYES: NEGATIVE for vision changes or irritation  ENT/MOUTH: NEGATIVE for ear, mouth and throat problems  RESP: NEGATIVE for significant cough or SOB  CV: NEGATIVE for chest pain, palpitations or peripheral edema  GI: NEGATIVE for nausea, abdominal pain, heartburn, or change in  bowel habits  : NEGATIVE for frequency, dysuria, or hematuria  MUSCULOSKELETAL: NEGATIVE for significant arthralgias or myalgia  NEURO: NEGATIVE for weakness, dizziness or paresthesias  ENDOCRINE: NEGATIVE for temperature intolerance, skin/hair changes  HEME: NEGATIVE for bleeding problems  PSYCHIATRIC: NEGATIVE for changes in mood or affect    Patient Active Problem List    Diagnosis Date Noted     AMELIA (generalized anxiety disorder) 02/28/2022     Priority: Medium     H/O abnormal cervical Papanicolaou smear 02/03/2020     Priority: Medium     No records available showing abnormal pap    2/4/16 NIL pap, neg HR HPV  6/30/17 NIL pap  11/12/20 NIL pap, neg HR HPV  5/25/23 NIL pap, neg HR HPV          Cervical neuritis      Priority: Medium     Created by Conversion  Replacement Utility updated for latest IMO load      Formatting of this note might be different from the original.  Created by Conversion    Replacement Utility updated for latest IMO load       Joint Pain, Localized In The Wrist      Priority: Medium     Created by Conversion          No past medical history on file.  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       Allergies   Allergen Reactions     Seasonal Allergies Other (See Comments)     Drainage in back of throat, runny nose        Social History     Tobacco Use     Smoking status: Never     Passive exposure: Never     Smokeless tobacco: Never     Tobacco comments:     Never smoked   Substance Use Topics     Alcohol use: Yes     Comment: Drink socially, no more than 1-2 drinks in a single sitting     Family History   Problem Relation Age of Onset     Cancer Mother 50.00        skin cancer/basal cell     Hypertension Father      No Known  Problems Brother      Anxiety Disorder Son      History   Drug Use No         Objective     /76   Pulse 75   Temp 98.6  F (37  C) (Tympanic)   Resp 14   Wt 88 kg (194 lb)   LMP 05/23/2023 (Exact Date)   SpO2 99%   Breastfeeding No   BMI 31.79 kg/m      Physical Exam    GENERAL APPEARANCE: healthy, alert and no distress     EYES: EOMI, PERRL     HENT: ear canals and TM's normal and nose and mouth without ulcers or lesions     NECK: no adenopathy, no asymmetry, masses, or scars and thyroid normal to palpation     RESP: lungs clear to auscultation - no rales, rhonchi or wheezes     CV: regular rates and rhythm, normal S1 S2, no S3 or S4 and no murmur, click or rub     ABDOMEN:  soft, nontender, no HSM or masses and bowel sounds normal     MS: extremities normal- no gross deformities noted, no evidence of inflammation in joints, FROM in all extremities.     SKIN: no suspicious lesions or rashes     NEURO: Normal strength and tone, sensory exam grossly normal, mentation intact and speech normal     PSYCH: mentation appears normal. and affect normal/bright     LYMPHATICS: No cervical adenopathy    No results for input(s): HGB, PLT, INR, NA, POTASSIUM, CR, A1C in the last 45812 hours.     Diagnostics:  Labs pending at this time.  Results will be reviewed when available.   No EKG required for low risk surgery (cataract, skin procedure, breast biopsy, etc).    Revised Cardiac Risk Index (RCRI):  The patient has the following serious cardiovascular risks for perioperative complications:   - No serious cardiac risks = 0 points     RCRI Interpretation: 0 points: Class I (very low risk - 0.4% complication rate)           Signed Electronically by: WALTER BRASHER PA-C  Copy of this evaluation report is provided to requesting physician.

## 2023-06-22 ENCOUNTER — OFFICE VISIT (OUTPATIENT)
Dept: FAMILY MEDICINE | Facility: CLINIC | Age: 38
End: 2023-06-22
Payer: OTHER GOVERNMENT

## 2023-06-22 VITALS
SYSTOLIC BLOOD PRESSURE: 107 MMHG | DIASTOLIC BLOOD PRESSURE: 76 MMHG | TEMPERATURE: 98.6 F | OXYGEN SATURATION: 99 % | BODY MASS INDEX: 31.79 KG/M2 | HEART RATE: 75 BPM | WEIGHT: 194 LBS | RESPIRATION RATE: 14 BRPM

## 2023-06-22 DIAGNOSIS — M72.2 PLANTAR FASCIA SYNDROME: ICD-10-CM

## 2023-06-22 DIAGNOSIS — Z01.818 PREOP GENERAL PHYSICAL EXAM: Primary | ICD-10-CM

## 2023-06-22 LAB
ANION GAP SERPL CALCULATED.3IONS-SCNC: 11 MMOL/L (ref 7–15)
BUN SERPL-MCNC: 12.5 MG/DL (ref 6–20)
CALCIUM SERPL-MCNC: 9 MG/DL (ref 8.6–10)
CHLORIDE SERPL-SCNC: 106 MMOL/L (ref 98–107)
CREAT SERPL-MCNC: 0.79 MG/DL (ref 0.51–0.95)
DEPRECATED HCO3 PLAS-SCNC: 25 MMOL/L (ref 22–29)
GFR SERPL CREATININE-BSD FRML MDRD: >90 ML/MIN/1.73M2
GLUCOSE SERPL-MCNC: 104 MG/DL (ref 70–99)
HGB BLD-MCNC: 13.4 G/DL (ref 11.7–15.7)
POTASSIUM SERPL-SCNC: 4 MMOL/L (ref 3.4–5.3)
SODIUM SERPL-SCNC: 142 MMOL/L (ref 136–145)

## 2023-06-22 PROCEDURE — 85018 HEMOGLOBIN: CPT | Performed by: PHYSICIAN ASSISTANT

## 2023-06-22 PROCEDURE — 80048 BASIC METABOLIC PNL TOTAL CA: CPT | Performed by: PHYSICIAN ASSISTANT

## 2023-06-22 PROCEDURE — 36415 COLL VENOUS BLD VENIPUNCTURE: CPT | Performed by: PHYSICIAN ASSISTANT

## 2023-06-22 PROCEDURE — 99213 OFFICE O/P EST LOW 20 MIN: CPT | Performed by: PHYSICIAN ASSISTANT

## 2023-06-22 ASSESSMENT — PAIN SCALES - GENERAL: PAINLEVEL: NO PAIN (0)

## 2023-07-06 ENCOUNTER — TRANSFERRED RECORDS (OUTPATIENT)
Dept: HEALTH INFORMATION MANAGEMENT | Facility: CLINIC | Age: 38
End: 2023-07-06
Payer: OTHER GOVERNMENT

## 2023-07-20 ENCOUNTER — VIRTUAL VISIT (OUTPATIENT)
Dept: PSYCHOLOGY | Facility: CLINIC | Age: 38
End: 2023-07-20
Payer: OTHER GOVERNMENT

## 2023-07-20 DIAGNOSIS — F41.1 GAD (GENERALIZED ANXIETY DISORDER): Primary | ICD-10-CM

## 2023-07-20 PROCEDURE — 90834 PSYTX W PT 45 MINUTES: CPT | Mod: 95
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
## 2023-08-10 ENCOUNTER — TRANSFERRED RECORDS (OUTPATIENT)
Dept: HEALTH INFORMATION MANAGEMENT | Facility: CLINIC | Age: 38
End: 2023-08-10
Payer: OTHER GOVERNMENT

## 2023-09-05 ENCOUNTER — VIRTUAL VISIT (OUTPATIENT)
Dept: PSYCHOLOGY | Facility: CLINIC | Age: 38
End: 2023-09-05
Payer: OTHER GOVERNMENT

## 2023-09-05 DIAGNOSIS — F41.1 GAD (GENERALIZED ANXIETY DISORDER): Primary | ICD-10-CM

## 2023-09-05 PROCEDURE — 90834 PSYTX W PT 45 MINUTES: CPT | Mod: 95
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
## 2023-10-03 ENCOUNTER — VIRTUAL VISIT (OUTPATIENT)
Dept: PSYCHOLOGY | Facility: CLINIC | Age: 38
End: 2023-10-03
Payer: OTHER GOVERNMENT

## 2023-10-03 DIAGNOSIS — F41.1 GAD (GENERALIZED ANXIETY DISORDER): Primary | ICD-10-CM

## 2023-10-03 PROCEDURE — 90834 PSYTX W PT 45 MINUTES: CPT | Mod: 95
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
## 2023-10-12 ENCOUNTER — TRANSFERRED RECORDS (OUTPATIENT)
Dept: HEALTH INFORMATION MANAGEMENT | Facility: CLINIC | Age: 38
End: 2023-10-12
Payer: OTHER GOVERNMENT

## 2023-10-16 ENCOUNTER — VIRTUAL VISIT (OUTPATIENT)
Dept: PSYCHOLOGY | Facility: CLINIC | Age: 38
End: 2023-10-16
Payer: OTHER GOVERNMENT

## 2023-10-16 DIAGNOSIS — F41.1 GAD (GENERALIZED ANXIETY DISORDER): Primary | ICD-10-CM

## 2023-10-16 PROCEDURE — 90837 PSYTX W PT 60 MINUTES: CPT | Mod: 95

## 2023-10-16 ASSESSMENT — ANXIETY QUESTIONNAIRES
5. BEING SO RESTLESS THAT IT IS HARD TO SIT STILL: NOT AT ALL
1. FEELING NERVOUS, ANXIOUS, OR ON EDGE: MORE THAN HALF THE DAYS
2. NOT BEING ABLE TO STOP OR CONTROL WORRYING: MORE THAN HALF THE DAYS
IF YOU CHECKED OFF ANY PROBLEMS ON THIS QUESTIONNAIRE, HOW DIFFICULT HAVE THESE PROBLEMS MADE IT FOR YOU TO DO YOUR WORK, TAKE CARE OF THINGS AT HOME, OR GET ALONG WITH OTHER PEOPLE: SOMEWHAT DIFFICULT
6. BECOMING EASILY ANNOYED OR IRRITABLE: SEVERAL DAYS
7. FEELING AFRAID AS IF SOMETHING AWFUL MIGHT HAPPEN: NOT AT ALL
3. WORRYING TOO MUCH ABOUT DIFFERENT THINGS: SEVERAL DAYS
GAD7 TOTAL SCORE: 7
GAD7 TOTAL SCORE: 7
4. TROUBLE RELAXING: SEVERAL DAYS
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
## 2023-11-01 ENCOUNTER — VIRTUAL VISIT (OUTPATIENT)
Dept: PSYCHOLOGY | Facility: CLINIC | Age: 38
End: 2023-11-01
Payer: OTHER GOVERNMENT

## 2023-11-01 DIAGNOSIS — F41.1 GAD (GENERALIZED ANXIETY DISORDER): Primary | ICD-10-CM

## 2023-11-01 PROCEDURE — 90837 PSYTX W PT 60 MINUTES: CPT | Mod: 95
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
## 2023-11-14 ENCOUNTER — TRANSFERRED RECORDS (OUTPATIENT)
Dept: HEALTH INFORMATION MANAGEMENT | Facility: CLINIC | Age: 38
End: 2023-11-14

## 2023-11-24 NOTE — PATIENT INSTRUCTIONS
If you have any questions regarding your visit, Please contact your care team.    [facility]: [phone]      Women s Health CLINIC HOURS TELEPHONE NUMBER   Jinny Zhou DO.    CHANTELLE Chanel-Surgery Scheduler  Luz Elena - Surgery Scheduler    ELÍAS Barber, ELÍAS Fan RN     Monday, Thursday  [place]  7am-3pm    Tuesday, Wednesday  [place]  7am-3pm    E/O Friday &   [place]    Typical Surgery Days: Thursday or Friday   [facility]  [address]  [place], [place] [address] [phone] Phone  [phone] Fax    [facility]  [address] [place] [address] [phone] Phone    Imaging [phone] Phone    [facility] Labor and Delivery:  [phone] Phone     **Surgeries** Our Surgery Schedulers will contact you to schedule. If you do not receive a call within 3 business days, please call [phone].    Urgent Care locations:  [facility] Saturday and    9 am - 5 pm    Monday-Friday   12 pm - 8 pm  Saturday and    9 am - 5 pm   [phone]       If you need a medication refill, please contact your pharmacy. Please allow 3 business days for your refill to be completed.  As always, Thank you for trusting us with your healthcare needs!

## 2023-11-29 ENCOUNTER — OFFICE VISIT (OUTPATIENT)
Dept: OBGYN | Facility: CLINIC | Age: 38
End: 2023-11-29
Payer: OTHER GOVERNMENT

## 2023-11-29 ENCOUNTER — VIRTUAL VISIT (OUTPATIENT)
Dept: PSYCHOLOGY | Facility: CLINIC | Age: 38
End: 2023-11-29
Payer: OTHER GOVERNMENT

## 2023-11-29 VITALS
SYSTOLIC BLOOD PRESSURE: 127 MMHG | BODY MASS INDEX: 33.69 KG/M2 | WEIGHT: 205.6 LBS | DIASTOLIC BLOOD PRESSURE: 82 MMHG | HEART RATE: 70 BPM

## 2023-11-29 DIAGNOSIS — F41.1 GAD (GENERALIZED ANXIETY DISORDER): Primary | ICD-10-CM

## 2023-11-29 DIAGNOSIS — Z30.432 ENCOUNTER FOR REMOVAL OF INTRAUTERINE CONTRACEPTIVE DEVICE: Primary | ICD-10-CM

## 2023-11-29 PROCEDURE — 90837 PSYTX W PT 60 MINUTES: CPT | Mod: 95

## 2023-11-29 PROCEDURE — 58301 REMOVE INTRAUTERINE DEVICE: CPT | Performed by: OBSTETRICS & GYNECOLOGY

## 2023-11-29 NOTE — PROGRESS NOTES
IUD Removal:  SUBJECTIVE:    Is a pregnancy test required: No.  Was a consent obtained?  Yes    Albertina Rojas is a 38 year old female,, Patient's last menstrual period was 2023. who presents today for IUD removal. Her current IUD was placed 3.5 years ago. She has not had problems with the IUD. She requests removal of the IUD because she desires to conceive    Today's PHQ-2 Score:       2023     2:04 PM   PHQ-2 ( 1999 Pfizer)   Q1: Little interest or pleasure in doing things 0   Q2: Feeling down, depressed or hopeless 0   PHQ-2 Score 0   Q1: Little interest or pleasure in doing things Not at all   Q2: Feeling down, depressed or hopeless Not at all   PHQ-2 Score 0       PROCEDURE:    A speculum exam was performed and the cervix was visualized. The IUD string was visualized. Using ring forceps, the string  was grasped and the IUD removed intact.    POST PROCEDURE:    The patient tolerated the procedure well. Patient was discharged in stable condition.    Pregnancy planning reviewed.  Call with +UPT  Discussed starting PNV with folic acid.    Jinny Zhou DO
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
## 2023-11-30 ENCOUNTER — E-VISIT (OUTPATIENT)
Dept: URGENT CARE | Facility: CLINIC | Age: 38
End: 2023-11-30
Payer: OTHER GOVERNMENT

## 2023-11-30 DIAGNOSIS — B30.9 VIRAL CONJUNCTIVITIS: Primary | ICD-10-CM

## 2023-11-30 PROCEDURE — 99207 PR NON-BILLABLE SERV PER CHARTING: CPT | Performed by: PHYSICIAN ASSISTANT

## 2023-11-30 RX ORDER — CIPROFLOXACIN HYDROCHLORIDE 3.5 MG/ML
SOLUTION/ DROPS TOPICAL
Qty: 5 ML | Refills: 0 | Status: SHIPPED | OUTPATIENT
Start: 2023-11-30 | End: 2023-12-07

## 2023-11-30 NOTE — PATIENT INSTRUCTIONS
Albertina Rojas,    Your photo and description of symptoms are consistent with pink eye caused by a virus. This can cause redness, irritation and itching in your eye as well as tearing and intermediate thickened discharge. Your eyes may even be stuck shut when when you wake up in the morning. Your eyelids may also become swollen. You'll be contagious while you have tearing and crusting in your eyes, generally 7-10 days. Wash your hands frequently and avoid touching your eyes to prevent spreading to others. You may use over the counter lubricating eye drops to help ease your symptoms. Allergy eye drops such as Patanol (olopatadine) or Zaditor (ketotifen) will help to control the itching. Avoid redness reducing eye drops for an extended period of time as these can cause a rebound and make the redness and irritation return when you stop using the drops. Cool packs on your eyes can help with irritation and swelling.     Antibiotic drops will not make a virus go away any faster and will not make you less contagious. However, if you notice that there is thick yellow or green discharge that is consistently draining from your eye (you're wiping it away every few minutes) then an antibiotic drop will help. I have sent a prescription to the pharmacy for you. Follow the instructions on the medication.    If your symptoms are getting worse or not improving by the 10th day of symptoms, be seen in person for further evaluation.    You'll be feeling better soon!    Radha Anguiano PA-C  [facility]    Pinkeye: Care Instructions  Overview     Pinkeye is redness and swelling of the eye surface and the conjunctiva (the lining of the eyelid and the covering of the white part of the eye). Pinkeye is also called conjunctivitis. Pinkeye is often caused by infection with bacteria or a virus. Dry air, allergies, smoke, and chemicals are other common causes.  Pinkeye often gets better on its own in 7 to 10 days. Antibiotics  only help if the pinkeye is caused by bacteria. Pinkeye caused by infection spreads easily. If an allergy or chemical is causing pinkeye, it will not go away unless you can avoid whatever is causing it.  Follow-up care is a key part of your treatment and safety. Be sure to make and go to all appointments, and call your doctor if you are having problems. It's also a good idea to know your test results and keep a list of the medicines you take.  How can you care for yourself at home?  Wash your hands often. Always wash them before and after you treat pinkeye or touch your eyes or face.  Use moist cotton or a clean, wet cloth to remove crust. Wipe from the inside corner of the eye to the outside. Use a clean part of the cloth for each wipe.  Put cold or warm wet cloths on your eye a few times a day if the eye hurts.  Do not wear contact lenses or eye makeup until the pinkeye is gone. Throw away any eye makeup you were using when you got pinkeye. Clean your contacts and storage case. If you wear disposable contacts, use a new pair when your eye has cleared and it is safe to wear contacts again.  If the doctor gave you antibiotic ointment or eyedrops, use them as directed. Use the medicine for as long as instructed, even if your eye starts looking better soon. Keep the bottle tip clean, and do not let it touch the eye area.  To put in eyedrops or ointment:  Tilt your head back, and pull your lower eyelid down with one finger.  Drop or squirt the medicine inside the lower lid.  Close your eye for 30 to 60 seconds to let the drops or ointment move around.  Do not touch the ointment or dropper tip to your eyelashes or any other surface.  Do not share towels, pillows, or washcloths while you have pinkeye.  When should you call for help?   Call your doctor now or seek immediate medical care if:    You have pain in your eye, not just irritation on the surface.     You have a change in vision or loss of vision.     You have an  "increase in discharge from the eye.     Your eye has not started to improve or begins to get worse within 48 hours after you start using antibiotics.     Pinkeye lasts longer than 7 days.   Watch closely for changes in your health, and be sure to contact your doctor if you have any problems.  Where can you learn more?  Go to https://www.[employer].net/patiented  Enter Y392 in the search box to learn more about \"Pinkeye: Care Instructions.\"  Current as of: June 6, 2023               Content Version: 13.8    9543-5414 [employer].   Care instructions adapted under license by your healthcare professional. If you have questions about a medical condition or this instruction, always ask your healthcare professional. [employer] disclaims any warranty or liability for your use of this information.      "

## 2023-12-13 ENCOUNTER — VIRTUAL VISIT (OUTPATIENT)
Dept: PSYCHOLOGY | Facility: CLINIC | Age: 38
End: 2023-12-13
Payer: OTHER GOVERNMENT

## 2023-12-13 DIAGNOSIS — F41.1 GAD (GENERALIZED ANXIETY DISORDER): Primary | ICD-10-CM

## 2023-12-13 PROCEDURE — 90837 PSYTX W PT 60 MINUTES: CPT | Mod: 95
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
## 2024-01-03 ENCOUNTER — OFFICE VISIT (OUTPATIENT)
Dept: OBGYN | Facility: CLINIC | Age: 39
End: 2024-01-03
Payer: OTHER GOVERNMENT

## 2024-01-03 ENCOUNTER — MYC MEDICAL ADVICE (OUTPATIENT)
Dept: OBGYN | Facility: CLINIC | Age: 39
End: 2024-01-03

## 2024-01-03 VITALS
BODY MASS INDEX: 33.1 KG/M2 | WEIGHT: 202 LBS | SYSTOLIC BLOOD PRESSURE: 126 MMHG | OXYGEN SATURATION: 98 % | HEART RATE: 80 BPM | DIASTOLIC BLOOD PRESSURE: 80 MMHG

## 2024-01-03 DIAGNOSIS — N89.8 VAGINAL ODOR: ICD-10-CM

## 2024-01-03 DIAGNOSIS — N92.6 IRREGULAR UTERINE BLEEDING: Primary | ICD-10-CM

## 2024-01-03 LAB
CLUE CELLS: ABNORMAL
TRICHOMONAS, WET PREP: ABNORMAL
WBC'S/HIGH POWER FIELD, WET PREP: ABNORMAL
YEAST, WET PREP: ABNORMAL

## 2024-01-03 PROCEDURE — 87210 SMEAR WET MOUNT SALINE/INK: CPT | Performed by: OBSTETRICS & GYNECOLOGY

## 2024-01-03 PROCEDURE — 87102 FUNGUS ISOLATION CULTURE: CPT | Performed by: OBSTETRICS & GYNECOLOGY

## 2024-01-03 PROCEDURE — 99213 OFFICE O/P EST LOW 20 MIN: CPT | Performed by: OBSTETRICS & GYNECOLOGY

## 2024-01-03 NOTE — PROGRESS NOTES
This 39 y/o female, , presents c/o irregular vaginal bleeding since having her ParaGard IUD removed on 2023.  She states that the abnormal bleeding began weeks after IUD removal and she is hoping to conceive soon.  In mid December she noted brownish vaginal discharge which was followed by 2 days of bright red blood on  and .  Then 5 days later she began to have a flow with clots.  She has also noted a foul vaginal odor x 10 days but denies any risk of STD exposure or history.    /80 (BP Location: Right arm, Patient Position: Chair, Cuff Size: Adult Regular)   Pulse 80   Wt 91.6 kg (202 lb)   LMP 2023 (Approximate)   SpO2 98%   Breastfeeding No   BMI 33.10 kg/m    ROS:  10 systems were reviewed and the positives were listed under problems.  In the lithotomy position, a bi-valve speculum was placed and a scant amount of brown vaginal discharge was noted so a wet prep and yeast culture were collected.  No vaginal lesions or growths were noted.  Her pelvic exam was negative.  Assessment - Abnormal uterine bleeding with recent episode of vaginal odor  Plan - Submit the wet prep and yeast culture, then treat if +.  There is no evidence of pelvic infection after IUD removal over one month ago.  She is trying to conceive so is not interested in contraceptive use at this time.  20 minutes were spent today in chart review, the patient visit, review of tests, and documentation in regard to the issues noted above.

## 2024-01-03 NOTE — TELEPHONE ENCOUNTER
Called patient after receiving mychart message with c/o  Brownish discharge and foul odor.  Offered appt to be seen today in clinic.  Scheduled at 2:30 pm today at  clinic with Dr. Velasquez

## 2024-01-03 NOTE — PATIENT INSTRUCTIONS
If you have labs or imaging done, the results will automatically release in AppMyDay without an interpretation.  Your health care professional will review those results and send an interpretation with recommendations as soon as possible, but this may be 1-3 business days.    If you have any questions regarding your visit, please contact your care team.     [employer]: [phone]  [facility] CLINIC HOURS TELEPHONE NUMBER   DO. Mckenzie Newsome -Surgery Scheduler  Luz Elena -     ELÍAS Barber RN Kylie, RN [place]    Monday 8:30 am-5:00 pm  Wednesday 8:30 am-5:00 pm  Friday 8:30 am-5:00 pm    Typical Surgery day: Tuesday [facility]  [address] [place] [address]  Phone:  [phone]  Fax: [phone]   Appointment [phone]    Imaging Scheduling-All Locations [phone]    [facility]  [address] [place] [address] [place] [address] [phone]   **Surgeries** Our Surgery Schedulers will contact you to schedule. If you do not receive a call within 3 business days, please call [phone].  Urgent Care locations:  [facility] Monday-Friday   10 am - 8 pm  Saturday and    9 am - 5 pm [phone]   If you need a medication refill, please contact your pharmacy. Please allow 3 business days for your refill to be completed.  As always, Thank you for trusting us with your healthcare needs!  see additional instructions from your care team below

## 2024-01-07 LAB — BACTERIA VAG AEROBE CULT: NO GROWTH

## 2024-01-10 ENCOUNTER — VIRTUAL VISIT (OUTPATIENT)
Dept: PSYCHOLOGY | Facility: CLINIC | Age: 39
End: 2024-01-10
Payer: OTHER GOVERNMENT

## 2024-01-10 DIAGNOSIS — F41.1 GAD (GENERALIZED ANXIETY DISORDER): Primary | ICD-10-CM

## 2024-01-10 PROCEDURE — 90834 PSYTX W PT 45 MINUTES: CPT | Mod: 93
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
## 2024-01-25 ENCOUNTER — VIRTUAL VISIT (OUTPATIENT)
Dept: PSYCHOLOGY | Facility: CLINIC | Age: 39
End: 2024-01-25
Payer: OTHER GOVERNMENT

## 2024-01-25 DIAGNOSIS — F41.1 GAD (GENERALIZED ANXIETY DISORDER): Primary | ICD-10-CM

## 2024-01-25 PROCEDURE — 90837 PSYTX W PT 60 MINUTES: CPT | Mod: 93

## 2024-01-25 ASSESSMENT — ANXIETY QUESTIONNAIRES
3. WORRYING TOO MUCH ABOUT DIFFERENT THINGS: SEVERAL DAYS
IF YOU CHECKED OFF ANY PROBLEMS ON THIS QUESTIONNAIRE, HOW DIFFICULT HAVE THESE PROBLEMS MADE IT FOR YOU TO DO YOUR WORK, TAKE CARE OF THINGS AT HOME, OR GET ALONG WITH OTHER PEOPLE: SOMEWHAT DIFFICULT
8. IF YOU CHECKED OFF ANY PROBLEMS, HOW DIFFICULT HAVE THESE MADE IT FOR YOU TO DO YOUR WORK, TAKE CARE OF THINGS AT HOME, OR GET ALONG WITH OTHER PEOPLE?: SOMEWHAT DIFFICULT
4. TROUBLE RELAXING: SEVERAL DAYS
7. FEELING AFRAID AS IF SOMETHING AWFUL MIGHT HAPPEN: SEVERAL DAYS
2. NOT BEING ABLE TO STOP OR CONTROL WORRYING: MORE THAN HALF THE DAYS
GAD7 TOTAL SCORE: 10
5. BEING SO RESTLESS THAT IT IS HARD TO SIT STILL: SEVERAL DAYS
6. BECOMING EASILY ANNOYED OR IRRITABLE: MORE THAN HALF THE DAYS
GAD7 TOTAL SCORE: 10
GAD7 TOTAL SCORE: 10
1. FEELING NERVOUS, ANXIOUS, OR ON EDGE: MORE THAN HALF THE DAYS
7. FEELING AFRAID AS IF SOMETHING AWFUL MIGHT HAPPEN: SEVERAL DAYS
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
## 2024-02-20 ENCOUNTER — VIRTUAL VISIT (OUTPATIENT)
Dept: PSYCHOLOGY | Facility: CLINIC | Age: 39
End: 2024-02-20
Payer: OTHER GOVERNMENT

## 2024-02-20 DIAGNOSIS — F41.1 GAD (GENERALIZED ANXIETY DISORDER): Primary | ICD-10-CM

## 2024-02-20 PROCEDURE — 90837 PSYTX W PT 60 MINUTES: CPT | Mod: 93
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
## 2024-03-07 ENCOUNTER — VIRTUAL VISIT (OUTPATIENT)
Dept: PSYCHOLOGY | Facility: CLINIC | Age: 39
End: 2024-03-07
Payer: OTHER GOVERNMENT

## 2024-03-07 DIAGNOSIS — F41.1 GAD (GENERALIZED ANXIETY DISORDER): Primary | ICD-10-CM

## 2024-03-07 PROCEDURE — 90837 PSYTX W PT 60 MINUTES: CPT | Mod: 93

## 2024-03-07 ASSESSMENT — ANXIETY QUESTIONNAIRES
GAD7 TOTAL SCORE: 21
GAD7 TOTAL SCORE: 21
IF YOU CHECKED OFF ANY PROBLEMS ON THIS QUESTIONNAIRE, HOW DIFFICULT HAVE THESE PROBLEMS MADE IT FOR YOU TO DO YOUR WORK, TAKE CARE OF THINGS AT HOME, OR GET ALONG WITH OTHER PEOPLE: VERY DIFFICULT
GAD7 TOTAL SCORE: 21
8. IF YOU CHECKED OFF ANY PROBLEMS, HOW DIFFICULT HAVE THESE MADE IT FOR YOU TO DO YOUR WORK, TAKE CARE OF THINGS AT HOME, OR GET ALONG WITH OTHER PEOPLE?: VERY DIFFICULT
6. BECOMING EASILY ANNOYED OR IRRITABLE: NEARLY EVERY DAY
4. TROUBLE RELAXING: NEARLY EVERY DAY
2. NOT BEING ABLE TO STOP OR CONTROL WORRYING: NEARLY EVERY DAY
3. WORRYING TOO MUCH ABOUT DIFFERENT THINGS: NEARLY EVERY DAY
5. BEING SO RESTLESS THAT IT IS HARD TO SIT STILL: NEARLY EVERY DAY
7. FEELING AFRAID AS IF SOMETHING AWFUL MIGHT HAPPEN: NEARLY EVERY DAY
1. FEELING NERVOUS, ANXIOUS, OR ON EDGE: NEARLY EVERY DAY
7. FEELING AFRAID AS IF SOMETHING AWFUL MIGHT HAPPEN: NEARLY EVERY DAY
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
## 2024-03-21 ENCOUNTER — VIRTUAL VISIT (OUTPATIENT)
Dept: PSYCHOLOGY | Facility: CLINIC | Age: 39
End: 2024-03-21
Payer: OTHER GOVERNMENT

## 2024-03-21 DIAGNOSIS — F41.1 GAD (GENERALIZED ANXIETY DISORDER): Primary | ICD-10-CM

## 2024-03-21 PROCEDURE — 90837 PSYTX W PT 60 MINUTES: CPT | Mod: 93
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
## 2024-04-08 ENCOUNTER — OFFICE VISIT (OUTPATIENT)
Dept: OBGYN | Facility: CLINIC | Age: 39
End: 2024-04-08
Payer: OTHER GOVERNMENT

## 2024-04-08 VITALS
HEART RATE: 79 BPM | DIASTOLIC BLOOD PRESSURE: 76 MMHG | OXYGEN SATURATION: 97 % | BODY MASS INDEX: 32.37 KG/M2 | WEIGHT: 201.4 LBS | HEIGHT: 66 IN | SYSTOLIC BLOOD PRESSURE: 113 MMHG

## 2024-04-08 DIAGNOSIS — N92.1 BREAKTHROUGH BLEEDING: Primary | ICD-10-CM

## 2024-04-08 LAB
CLUE CELLS: ABNORMAL
PROGEST SERPL-MCNC: 3.4 NG/ML
TRICHOMONAS, WET PREP: ABNORMAL
WBC'S/HIGH POWER FIELD, WET PREP: ABNORMAL
YEAST, WET PREP: ABNORMAL

## 2024-04-08 PROCEDURE — 36415 COLL VENOUS BLD VENIPUNCTURE: CPT | Performed by: NURSE PRACTITIONER

## 2024-04-08 PROCEDURE — 87210 SMEAR WET MOUNT SALINE/INK: CPT | Performed by: NURSE PRACTITIONER

## 2024-04-08 PROCEDURE — 99213 OFFICE O/P EST LOW 20 MIN: CPT | Performed by: NURSE PRACTITIONER

## 2024-04-08 PROCEDURE — 99459 PELVIC EXAMINATION: CPT | Performed by: NURSE PRACTITIONER

## 2024-04-08 PROCEDURE — 84144 ASSAY OF PROGESTERONE: CPT | Performed by: NURSE PRACTITIONER

## 2024-04-08 NOTE — PROGRESS NOTES
Assessment & Plan     Breakthrough bleeding  We discussed possible etiologies of her bleeding, including the timing with her ovulation for this cycle. Plan progesterone level today and discussed rationale for wet prep-which was negative for infection. Menses due next week-patient will see if she has a normal bleed and if so, plan to schedule pelvic ultrasound for additional evaluation. If no regular menses next week, recommend home pregnancy test and consider serial HCG if pregnant. Would then hold off on ultrasound until further along in pregnancy. Plan follow up based on cycle/ultrasound. Patient is given an opportunity to ask questions and have them answered.  - US Pelvic Transabdominal and Transvaginal; Future  - OR PELVIC EXAMINATION  - Wet preparation  - Progesterone; Future  - Progesterone    Subjective   Albertina is a 38 year old, presenting for the following health issues:  Abnormal Uterine Bleeding    HPI       Abnormal Uterine Bleeding    Presents today with concern of breakthrough bleeding. Copper IUD removed last November. Patient is attempting pregnancy at this time. Cycles mostly regular, has a little spotting just before menses onset. Patient's LMP was 3/23-3/29 and her home ovulation test shows ovulation 4/2/24. That day she began having some spotting that has persisted. Not enough to need protection, but notable wit wiping. More notable in the morning and again in the evening. Mostly brown, but has had some brighter pink to red times. Some mucous, but no large clots. According to her tracker, menses due 4/16/24 if not pregnant.   This cycle, has had higher LH levels than previous cycles.   No prior extended spotting mid cycle.  Denies cramping, abnormal vaginal symptoms prior to cycle. No dizziness, fatigue.    Review of Systems  Constitutional, HEENT, cardiovascular, pulmonary, gi and gu systems are negative, except as otherwise noted.      Objective    /76 (BP Location: Right arm, Patient  "Position: Sitting, Cuff Size: Adult Large)   Pulse 79   Ht 1.664 m (5' 5.5\")   Wt 91.4 kg (201 lb 6.4 oz)   LMP 03/23/2024 (Exact Date)   SpO2 97%   BMI 33.00 kg/m    Body mass index is 33 kg/m .  Physical Exam   GENERAL: alert and no distress   (female) w/bimanual: normal female external genitalia, normal urethral meatus, normal vaginal mucosa, normal cervix/adnexa/uterus without masses or discharge. Small amount of darker blood noted in vagina  MS: no gross musculoskeletal defects noted, no edema  SKIN: no suspicious lesions or rashes  PSYCH: mentation appears normal, affect normal/bright    Results for orders placed or performed in visit on 04/08/24 (from the past 24 hour(s))   Wet preparation    Specimen: Vagina; Swab   Result Value Ref Range    Trichomonas Absent Absent    Yeast Absent Absent    Clue Cells Absent Absent    WBCs/high power field 1+ (A) None           Signed Electronically by: PREETHI Negrete CNP    "

## 2024-04-08 NOTE — PATIENT INSTRUCTIONS
If you have any questions regarding your visit, Please contact your care team.     [facility] Services: [phone]  To Schedule an Appointment 24/7  Call: [phone] TELEPHONE NUMBER     Shannon Roth- APRN CNP      Marcos Rincon-Surgery Scheduler  Luz Elena-Surgery Scheduler         Monday 7:30am-2:00pm    Tuesday 7:30am-4:00pm    Wednesday 7:30am-2:00pm    Thursday 7:30am-11:00am    Friday 7:30am-2:00pm [address] [address]  Phone- [phone]   Fax- [phone]     Imaging Scheduling all locations  [phone]    [facility]  [address] [place] [address]   [place] [address] [phone]         Urgent Care locations:  [facility]   Monday-Friday  10 am - 8 pm  Saturday and Sunday   9 am - 5 pm     [phone]   If you need a medication refill, please contact your pharmacy. Please allow 3 business days for your refill to be completed.  As always, Thank you for trusting us with your healthcare needs!      see additional instructions from your care team below

## 2024-04-10 ENCOUNTER — LAB (OUTPATIENT)
Dept: LAB | Facility: CLINIC | Age: 39
End: 2024-04-10
Payer: OTHER GOVERNMENT

## 2024-04-10 DIAGNOSIS — N92.1 BREAKTHROUGH BLEEDING: ICD-10-CM

## 2024-04-10 PROCEDURE — 36415 COLL VENOUS BLD VENIPUNCTURE: CPT

## 2024-04-10 PROCEDURE — 84144 ASSAY OF PROGESTERONE: CPT

## 2024-04-11 LAB — PROGEST SERPL-MCNC: 4.2 NG/ML

## 2024-04-15 ENCOUNTER — LAB (OUTPATIENT)
Dept: LAB | Facility: CLINIC | Age: 39
End: 2024-04-15
Payer: OTHER GOVERNMENT

## 2024-04-15 DIAGNOSIS — O26.859 SPOTTING IN EARLY PREGNANCY: ICD-10-CM

## 2024-04-15 PROCEDURE — 36415 COLL VENOUS BLD VENIPUNCTURE: CPT

## 2024-04-15 PROCEDURE — 84702 CHORIONIC GONADOTROPIN TEST: CPT

## 2024-04-16 LAB — HCG INTACT+B SERPL-ACNC: 99 MIU/ML

## 2024-04-17 ENCOUNTER — LAB (OUTPATIENT)
Dept: LAB | Facility: CLINIC | Age: 39
End: 2024-04-17
Payer: OTHER GOVERNMENT

## 2024-04-17 DIAGNOSIS — O26.859 SPOTTING IN EARLY PREGNANCY: ICD-10-CM

## 2024-04-17 LAB — HCG INTACT+B SERPL-ACNC: 141 MIU/ML

## 2024-04-17 PROCEDURE — 84702 CHORIONIC GONADOTROPIN TEST: CPT

## 2024-04-17 PROCEDURE — 36415 COLL VENOUS BLD VENIPUNCTURE: CPT

## 2024-04-18 ENCOUNTER — VIRTUAL VISIT (OUTPATIENT)
Dept: PSYCHOLOGY | Facility: CLINIC | Age: 39
End: 2024-04-18
Payer: OTHER GOVERNMENT

## 2024-04-18 DIAGNOSIS — F41.1 GAD (GENERALIZED ANXIETY DISORDER): Primary | ICD-10-CM

## 2024-04-18 PROCEDURE — 90837 PSYTX W PT 60 MINUTES: CPT | Mod: 93
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
## 2024-04-19 ENCOUNTER — LAB (OUTPATIENT)
Dept: LAB | Facility: CLINIC | Age: 39
End: 2024-04-19
Payer: OTHER GOVERNMENT

## 2024-04-19 DIAGNOSIS — O26.859 SPOTTING IN EARLY PREGNANCY: ICD-10-CM

## 2024-04-19 PROCEDURE — 84702 CHORIONIC GONADOTROPIN TEST: CPT

## 2024-04-19 PROCEDURE — 36415 COLL VENOUS BLD VENIPUNCTURE: CPT

## 2024-04-20 LAB — HCG INTACT+B SERPL-ACNC: 179 MIU/ML

## 2024-04-22 ENCOUNTER — LAB (OUTPATIENT)
Dept: LAB | Facility: CLINIC | Age: 39
End: 2024-04-22
Payer: OTHER GOVERNMENT

## 2024-04-22 ENCOUNTER — ANCILLARY PROCEDURE (OUTPATIENT)
Dept: ULTRASOUND IMAGING | Facility: OTHER | Age: 39
End: 2024-04-22
Attending: NURSE PRACTITIONER
Payer: OTHER GOVERNMENT

## 2024-04-22 ENCOUNTER — TELEPHONE (OUTPATIENT)
Dept: OBGYN | Facility: CLINIC | Age: 39
End: 2024-04-22
Payer: OTHER GOVERNMENT

## 2024-04-22 ENCOUNTER — TEAM CONFERENCE (OUTPATIENT)
Dept: OBGYN | Facility: CLINIC | Age: 39
End: 2024-04-22
Payer: OTHER GOVERNMENT

## 2024-04-22 DIAGNOSIS — O02.81 INAPPROPRIATE CHANGE IN QUANTITATIVE HCG IN EARLY PREGNANCY: ICD-10-CM

## 2024-04-22 DIAGNOSIS — O36.80X0 PREGNANCY WITH INCONCLUSIVE FETAL VIABILITY, SINGLE OR UNSPECIFIED FETUS: Primary | ICD-10-CM

## 2024-04-22 DIAGNOSIS — O36.80X0 PREGNANCY WITH INCONCLUSIVE FETAL VIABILITY, SINGLE OR UNSPECIFIED FETUS: ICD-10-CM

## 2024-04-22 DIAGNOSIS — Z53.9 ERRONEOUS ENCOUNTER--DISREGARD: Primary | ICD-10-CM

## 2024-04-22 PROCEDURE — 76817 TRANSVAGINAL US OBSTETRIC: CPT | Mod: TC | Performed by: RADIOLOGY

## 2024-04-22 PROCEDURE — 84702 CHORIONIC GONADOTROPIN TEST: CPT

## 2024-04-22 PROCEDURE — 76801 OB US < 14 WKS SINGLE FETUS: CPT | Mod: TC | Performed by: RADIOLOGY

## 2024-04-22 PROCEDURE — 36415 COLL VENOUS BLD VENIPUNCTURE: CPT

## 2024-04-22 NOTE — TELEPHONE ENCOUNTER
[facility]    Phone Message    May a detailed message be left on voicemail: yes     Reason for Call: Other: Pt is returning call from provider to discuss results. Best number to reach her at is [phone]. Pt states she is available the rest of the day. Please reach back out to her.     Action Taken: Other: an obgyn    Travel Screening: Not Applicable

## 2024-04-22 NOTE — ADDENDUM NOTE
Addended by: NICOLA TARIQ on: 4/22/2024 11:56 AM     Modules accepted: Orders, Level of Service     yes

## 2024-04-22 NOTE — TELEPHONE ENCOUNTER
Spoke with patient and discussed slow rise in HCG levels over the last week. Her bleeding completely stopped as of 4/16/24-at the end, was noting some whitish tissue. Reviewed HCG levels and coupled with her recent ongoing bleeding, this does not appear to be a normally progressing pregnancy. We reviewed options and also discussed that we cannot yet rule out ectopic pregnancy.   Recommend repeat HCG today or tomorrow and also ultrasound-discussed not likely to see much on imaging with such low HCG, but may provide some guidance. Also then recommend follow up appointment with GYN Physician and discussed rationale. Discussed management options. Follow up scheduled. Patient given ectopic precautions and also bleeding precautions, discussed pelvic rest. Questions answered. Shannon ORO CNP

## 2024-04-23 LAB — HCG INTACT+B SERPL-ACNC: 302 MIU/ML

## 2024-04-23 NOTE — PATIENT INSTRUCTIONS
If you have any questions regarding your visit, Please contact your care team.     ULURU Services: [phone]    To Schedule an Appointment 24/7  Call: [phone]     Yony Alfaro MD  Medical Director        Mita-ELÍAS Barber-RN  Emily Rincon-Surgery Scheduler  Luz Elena-Surgery Scheduler               Tuesday-[place]  7:30 a.m-4:30 p.m    Thursday-[place]  7:30 a.m-4:30 p.m    Typical Surgery Days: Tuesday or Friday [facility] [place]  [address] [place] [address]  PH: [phone]    Imaging Scheduling all locations  PH: [phone]     [facility]  [address] [place] [address] [place] [address]  PH: [phone]    [facility]  [address] [place] [address]  PH: [phone] Fax      **Surgeries** Our Surgery Schedulers will contact you to schedule. If you do not receive a call within 3 business days, please call [phone].    Urgent Care locations:  [facility] Monday-Friday  10 am - 8 pm  Saturday and Sunday   9 am - 5 pm  Monday-Friday   10 am - 8 pm  Saturday and Sunday   9 am - 5 pm   [phone]   If you need a medication refill, please contact your pharmacy. Please allow 3 business days for your refill to be completed.  As always, Thank you for trusting us with your healthcare needs!    see additional instructions from your care team below

## 2024-04-24 ENCOUNTER — OFFICE VISIT (OUTPATIENT)
Dept: OBGYN | Facility: CLINIC | Age: 39
End: 2024-04-24
Payer: OTHER GOVERNMENT

## 2024-04-24 VITALS
DIASTOLIC BLOOD PRESSURE: 70 MMHG | OXYGEN SATURATION: 100 % | HEART RATE: 75 BPM | WEIGHT: 199.4 LBS | BODY MASS INDEX: 32.68 KG/M2 | SYSTOLIC BLOOD PRESSURE: 110 MMHG

## 2024-04-24 DIAGNOSIS — Z34.90 EARLY STAGE OF PREGNANCY: Primary | ICD-10-CM

## 2024-04-24 PROCEDURE — 99215 OFFICE O/P EST HI 40 MIN: CPT | Performed by: OBSTETRICS & GYNECOLOGY

## 2024-04-24 RX ORDER — LACTOBACILLUS RHAMNOSUS GG 10B CELL
1 CAPSULE ORAL 2 TIMES DAILY
COMMUNITY

## 2024-04-24 NOTE — LETTER
[facility] [address] [address] [phone]    Albertina Rojas    To whom it may concern:    Albertina is under our care for her pregnancy.  She has a Estimated Date of Delivery: 12/28/2024.  She is having some complications and the health of the pregnancy is uncertain.  We will be following her closely in the next couple weeks to determine that.    Yony Alfaro MD FACOG  April 24, 2024

## 2024-04-24 NOTE — PROGRESS NOTES
Albertina presents today with concerns.  She has HCG levels rising, but not doubling appropriately  She denies any pain, cramping or bleeding.    ROS: 10 point review of systems is negative except for above.  Past Medical, surgical and Obstetric history is recorded in EPIC and reviewed.    LMP 2024 (Exact Date)     HCG:    Component      Latest Ref Rng 4/15/2024  2:25 PM 2024  2:28 PM 2024  2:33 PM 2024  2:01 PM   hCG Quantitative      <5 mIU/mL 99 (H)  141 (H)  179 (H)  302 (H)       Legend:  (H) High    OB vitals noted.  U/S:  : No intrauterine gestational sac is identified. No embryonic  pole or embryonic cardiac activity is identified. The endometrial  stripe measures 1.3 cm, and is within normal limits for a  premenopausal patient. The ovaries are unremarkable. Blood flow is  identified within both ovaries. No adnexal masses are identified. No  free fluid within the pelvis.                                                                      IMPRESSION:   No intrauterine pregnancy is identified. In the setting of a positive  pregnancy test, differential considerations include an intrauterine  pregnancy too early to visualize, a spontaneous  in progress,  or possibly an ectopic pregnancy. Beta hCG correlation and close  followup are recommended.    Reviewed our understanding of normal HCG progression in early pregnancy and the correlation with early ultrasound.  Reviewed that her HCG levels don't appear to be rising appropriately, but I can't definitively determine this is non-viable at this time.  Discussed option of continue HCG surveillance vs Medical management vs DILATION AND CURRETAGE.  She prefers to manage this expectantly, at least until such time as we can be sure of non-viability.  Questions asked and answered.      A/P: (Z34.90) Early stage of pregnancy  (primary encounter diagnosis)  Comment: 45 minutes spent on the date of the encounter doing chart review, review of test  results, patient visit, and documentation    Plan: hCG Quantitative Pregnancy

## 2024-04-25 ENCOUNTER — MYC MEDICAL ADVICE (OUTPATIENT)
Dept: OBGYN | Facility: CLINIC | Age: 39
End: 2024-04-25
Payer: OTHER GOVERNMENT

## 2024-04-25 DIAGNOSIS — O02.1 MISSED ABORTION: Primary | ICD-10-CM

## 2024-04-29 ENCOUNTER — LAB (OUTPATIENT)
Dept: LAB | Facility: CLINIC | Age: 39
End: 2024-04-29
Payer: OTHER GOVERNMENT

## 2024-04-29 DIAGNOSIS — Z34.90 EARLY STAGE OF PREGNANCY: ICD-10-CM

## 2024-04-29 LAB — HCG INTACT+B SERPL-ACNC: 516 MIU/ML

## 2024-04-29 PROCEDURE — 36415 COLL VENOUS BLD VENIPUNCTURE: CPT

## 2024-04-29 PROCEDURE — 84702 CHORIONIC GONADOTROPIN TEST: CPT

## 2024-05-01 NOTE — TELEPHONE ENCOUNTER
"Per 4/24/24 OV plan:  \"Reviewed our understanding of normal HCG progression in early pregnancy and the correlation with early ultrasound.  Reviewed that her HCG levels don't appear to be rising appropriately, but I can't definitively determine this is non-viable at this time.  Discussed option of continue HCG surveillance vs Medical management vs DILATION AND CURRETAGE.  She prefers to manage this expectantly, at least until such time as we can be sure of non-viability.  Questions asked and answered.\"    Pt had an HCG quant level done yesterday and it gus again. Per result note:  \"Albertina, your levels are still rising, but very slowly.  Let me know if you have questions.\"     Pt is no longer experiencing bleeding (she has only had light bleeding) but does feel \"really bloated\" and occasionally nauseous.  She had bad cramps yesterday while walking but no blood.    Pt is wondering if she is supposed to continue with monitoring her HCG levels or just supposed to wait to see what her body does.    Routing to Dr. Alfaro to clarify what pt's next steps are: should she repeat another HCG quant level in 2 days, should she repeat an US, should she be seen in clinic, etc?    Elisabeth Martines RN    "

## 2024-05-04 ENCOUNTER — LAB (OUTPATIENT)
Dept: LAB | Facility: CLINIC | Age: 39
End: 2024-05-04
Payer: OTHER GOVERNMENT

## 2024-05-04 DIAGNOSIS — O02.1 MISSED ABORTION: ICD-10-CM

## 2024-05-04 LAB — HCG INTACT+B SERPL-ACNC: 680 MIU/ML

## 2024-05-04 PROCEDURE — 84702 CHORIONIC GONADOTROPIN TEST: CPT

## 2024-05-04 PROCEDURE — 36415 COLL VENOUS BLD VENIPUNCTURE: CPT

## 2024-05-07 ENCOUNTER — LAB REQUISITION (OUTPATIENT)
Dept: LAB | Facility: CLINIC | Age: 39
End: 2024-05-07
Payer: OTHER GOVERNMENT

## 2024-05-07 DIAGNOSIS — O00.90 UNSPECIFIED ECTOPIC PREGNANCY WITHOUT INTRAUTERINE PREGNANCY: ICD-10-CM

## 2024-05-07 LAB
ERYTHROCYTE [DISTWIDTH] IN BLOOD BY AUTOMATED COUNT: 13.2 % (ref 10–15)
HCT VFR BLD AUTO: 40.4 % (ref 35–47)
HGB BLD-MCNC: 13.2 G/DL (ref 11.7–15.7)
HOLD SPECIMEN: NORMAL
MCH RBC QN AUTO: 27.2 PG (ref 26.5–33)
MCHC RBC AUTO-ENTMCNC: 32.7 G/DL (ref 31.5–36.5)
MCV RBC AUTO: 83 FL (ref 78–100)
PLATELET # BLD AUTO: 248 10E3/UL (ref 150–450)
RBC # BLD AUTO: 4.85 10E6/UL (ref 3.8–5.2)
WBC # BLD AUTO: 9.9 10E3/UL (ref 4–11)

## 2024-05-07 PROCEDURE — 84450 TRANSFERASE (AST) (SGOT): CPT | Mod: ORL | Performed by: OBSTETRICS & GYNECOLOGY

## 2024-05-07 PROCEDURE — 84460 ALANINE AMINO (ALT) (SGPT): CPT | Mod: ORL | Performed by: OBSTETRICS & GYNECOLOGY

## 2024-05-07 PROCEDURE — 85027 COMPLETE CBC AUTOMATED: CPT | Mod: ORL | Performed by: OBSTETRICS & GYNECOLOGY

## 2024-05-07 PROCEDURE — 84702 CHORIONIC GONADOTROPIN TEST: CPT | Mod: ORL | Performed by: OBSTETRICS & GYNECOLOGY

## 2024-05-07 PROCEDURE — 82565 ASSAY OF CREATININE: CPT | Mod: ORL | Performed by: OBSTETRICS & GYNECOLOGY

## 2024-05-08 LAB
ALT SERPL W P-5'-P-CCNC: 16 U/L (ref 0–50)
AST SERPL W P-5'-P-CCNC: 20 U/L (ref 0–45)
CREAT SERPL-MCNC: 0.91 MG/DL (ref 0.51–0.95)
EGFRCR SERPLBLD CKD-EPI 2021: 83 ML/MIN/1.73M2
HCG INTACT+B SERPL-ACNC: 614 MIU/ML

## 2024-05-10 ENCOUNTER — LAB REQUISITION (OUTPATIENT)
Dept: LAB | Facility: CLINIC | Age: 39
End: 2024-05-10
Payer: OTHER GOVERNMENT

## 2024-05-10 DIAGNOSIS — O00.90 UNSPECIFIED ECTOPIC PREGNANCY WITHOUT INTRAUTERINE PREGNANCY: ICD-10-CM

## 2024-05-10 LAB — HCG INTACT+B SERPL-ACNC: 534 MIU/ML

## 2024-05-10 PROCEDURE — 84702 CHORIONIC GONADOTROPIN TEST: CPT | Mod: ORL | Performed by: OBSTETRICS & GYNECOLOGY

## 2024-05-13 ENCOUNTER — LAB REQUISITION (OUTPATIENT)
Dept: LAB | Facility: CLINIC | Age: 39
End: 2024-05-13
Payer: OTHER GOVERNMENT

## 2024-05-13 DIAGNOSIS — O00.90 UNSPECIFIED ECTOPIC PREGNANCY WITHOUT INTRAUTERINE PREGNANCY: ICD-10-CM

## 2024-05-13 LAB — HCG INTACT+B SERPL-ACNC: 333 MIU/ML

## 2024-05-13 PROCEDURE — 84702 CHORIONIC GONADOTROPIN TEST: CPT | Mod: ORL | Performed by: OBSTETRICS & GYNECOLOGY

## 2024-05-16 ENCOUNTER — VIRTUAL VISIT (OUTPATIENT)
Dept: PSYCHOLOGY | Facility: CLINIC | Age: 39
End: 2024-05-16
Payer: OTHER GOVERNMENT

## 2024-05-16 DIAGNOSIS — F41.1 GAD (GENERALIZED ANXIETY DISORDER): Primary | ICD-10-CM

## 2024-05-16 PROCEDURE — 90837 PSYTX W PT 60 MINUTES: CPT | Mod: 93
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
## 2024-05-20 ENCOUNTER — LAB REQUISITION (OUTPATIENT)
Dept: LAB | Facility: CLINIC | Age: 39
End: 2024-05-20
Payer: OTHER GOVERNMENT

## 2024-05-20 DIAGNOSIS — O00.90 UNSPECIFIED ECTOPIC PREGNANCY WITHOUT INTRAUTERINE PREGNANCY: ICD-10-CM

## 2024-05-20 PROCEDURE — 84702 CHORIONIC GONADOTROPIN TEST: CPT | Performed by: OBSTETRICS & GYNECOLOGY

## 2024-05-21 LAB — HCG INTACT+B SERPL-ACNC: 42 MIU/ML

## 2024-05-28 ENCOUNTER — LAB REQUISITION (OUTPATIENT)
Dept: LAB | Facility: CLINIC | Age: 39
End: 2024-05-28
Payer: OTHER GOVERNMENT

## 2024-05-28 DIAGNOSIS — O00.90 UNSPECIFIED ECTOPIC PREGNANCY WITHOUT INTRAUTERINE PREGNANCY: ICD-10-CM

## 2024-05-28 LAB — HCG INTACT+B SERPL-ACNC: 29 MIU/ML

## 2024-05-28 PROCEDURE — 84702 CHORIONIC GONADOTROPIN TEST: CPT | Mod: ORL | Performed by: OBSTETRICS & GYNECOLOGY

## 2024-06-04 ENCOUNTER — LAB REQUISITION (OUTPATIENT)
Dept: LAB | Facility: CLINIC | Age: 39
End: 2024-06-04
Payer: OTHER GOVERNMENT

## 2024-06-04 DIAGNOSIS — O00.90 UNSPECIFIED ECTOPIC PREGNANCY WITHOUT INTRAUTERINE PREGNANCY: ICD-10-CM

## 2024-06-04 PROCEDURE — 84702 CHORIONIC GONADOTROPIN TEST: CPT | Mod: ORL | Performed by: OBSTETRICS & GYNECOLOGY

## 2024-06-05 LAB — HCG INTACT+B SERPL-ACNC: 23 MIU/ML

## 2024-06-11 ENCOUNTER — VIRTUAL VISIT (OUTPATIENT)
Dept: PSYCHOLOGY | Facility: CLINIC | Age: 39
End: 2024-06-11
Payer: OTHER GOVERNMENT

## 2024-06-11 ENCOUNTER — LAB REQUISITION (OUTPATIENT)
Dept: LAB | Facility: CLINIC | Age: 39
End: 2024-06-11
Payer: OTHER GOVERNMENT

## 2024-06-11 DIAGNOSIS — F41.1 GAD (GENERALIZED ANXIETY DISORDER): Primary | ICD-10-CM

## 2024-06-11 DIAGNOSIS — O00.90 UNSPECIFIED ECTOPIC PREGNANCY WITHOUT INTRAUTERINE PREGNANCY: ICD-10-CM

## 2024-06-11 PROCEDURE — 84702 CHORIONIC GONADOTROPIN TEST: CPT | Mod: ORL | Performed by: OBSTETRICS & GYNECOLOGY

## 2024-06-11 PROCEDURE — 90837 PSYTX W PT 60 MINUTES: CPT | Mod: 93
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
## 2024-06-12 LAB — HCG INTACT+B SERPL-ACNC: 13 MIU/ML

## 2024-06-25 ENCOUNTER — LAB REQUISITION (OUTPATIENT)
Dept: LAB | Facility: CLINIC | Age: 39
End: 2024-06-25
Payer: OTHER GOVERNMENT

## 2024-06-25 DIAGNOSIS — O00.90 UNSPECIFIED ECTOPIC PREGNANCY WITHOUT INTRAUTERINE PREGNANCY: ICD-10-CM

## 2024-06-25 LAB — HCG INTACT+B SERPL-ACNC: 6 MIU/ML

## 2024-06-25 PROCEDURE — 84702 CHORIONIC GONADOTROPIN TEST: CPT | Mod: ORL | Performed by: OBSTETRICS & GYNECOLOGY

## 2024-07-01 ENCOUNTER — LAB REQUISITION (OUTPATIENT)
Dept: LAB | Facility: CLINIC | Age: 39
End: 2024-07-01
Payer: OTHER GOVERNMENT

## 2024-07-01 DIAGNOSIS — O00.90 UNSPECIFIED ECTOPIC PREGNANCY WITHOUT INTRAUTERINE PREGNANCY: ICD-10-CM

## 2024-07-01 LAB — HCG INTACT+B SERPL-ACNC: 1 MIU/ML

## 2024-07-01 PROCEDURE — 84702 CHORIONIC GONADOTROPIN TEST: CPT | Mod: ORL | Performed by: OBSTETRICS & GYNECOLOGY

## 2024-07-05 ENCOUNTER — NURSE TRIAGE (OUTPATIENT)
Dept: FAMILY MEDICINE | Facility: CLINIC | Age: 39
End: 2024-07-05
Payer: OTHER GOVERNMENT

## 2024-07-05 NOTE — TELEPHONE ENCOUNTER
Reason for Disposition    SEVERE back pain (e.g., excruciating, unable to do any normal activities) and not improved after pain medicine and CARE ADVICE    Protocols used: Back Pain-A-[place]

## 2024-07-05 NOTE — TELEPHONE ENCOUNTER
Incoming call from pt stating she has had upper back pain since Tuesday with no known injury.  Pt states the pain worsens when taking a deep breath, or moving her trunk.  Pt states pain was approximately #5/10, until it worsened today when she reached and picked up a Keurig.  Pain not relieved by using OTC Tylenol or ibuprofen.    Pt states today when she lifted a Keurig, a really sharp pain sensation went from the center of her chest and straight back into her upper midback.  Pt states the pain in her chest has resolved and remains in the upper midback again, but is now #7-8/10 instead of 5/10 like yesterday.    Pt states pain has improved, and is currently a 6-7/10 in back. Pt states pain is worst when taking a deep breath, trying to get up, or bending or twisting of the trunk.    DENIES: dizziness, lightheadedness, SOB, arm pain, neck pain, and denies CP at time of call stating it resolved and remains in the back.    RN spoke with Dr. Murguia at [facility]. Dr. Murguia advised that pt go to urgent care, instead of [facility], for her initial assessment today since the pain is worsened by movement. RN returned call to pt and relayed provider's recommendation to go to urgent care now. RN advised pt if she develops new or worsening sx she should call 911 for assessment at ED and not drive self with new or worse sx. Pt indicates understanding of issues and agrees with the plan.      Additional Information   Negative: Passed out (i.e., fainted, collapsed and was not responding)   Negative: Shock suspected (e.g., cold/pale/clammy skin, too weak to stand, low BP, rapid pulse)   Negative: Sounds like a life-threatening emergency to the triager   Negative: Major injury to the back (e.g., MVA, fall > 10 feet or 3 meters, penetrating injury, etc.)   Negative: Pain in the upper back over the ribs (rib cage) that radiates (travels) into the chest   Negative: Pain in the upper back over the ribs (rib cage) and worsened by  coughing (or clearly increases with breathing)   Negative: Back pain during pregnancy   Negative: SEVERE back pain of sudden onset and age > 60 years   Negative: SEVERE abdominal pain (e.g., excruciating)   Negative: Abdominal pain and age > 60 years   Negative: Unable to urinate (or only a few drops) and bladder feels very full   Negative: Loss of bladder or bowel control (urine or bowel incontinence; wetting self, leaking stool) of new-onset   Negative: Numbness (loss of sensation) in groin or rectal area   Negative: Pain radiates into groin, scrotum   Negative: Blood in urine (red, pink, or tea-colored)   Negative: Vomiting and pain over lower ribs of back (i.e., flank - kidney area)   Negative: Weakness of a leg or foot (e.g., unable to bear weight, dragging foot)   Negative: Patient sounds very sick or weak to the triager   Negative: Fever > 100.4 F (38.0 C) and flank pain   Negative: Pain or burning with passing urine (urination)    Protocols used: Back Pain-A-[place]

## 2024-07-13 ASSESSMENT — ANXIETY QUESTIONNAIRES
GAD7 TOTAL SCORE: 14
IF YOU CHECKED OFF ANY PROBLEMS ON THIS QUESTIONNAIRE, HOW DIFFICULT HAVE THESE PROBLEMS MADE IT FOR YOU TO DO YOUR WORK, TAKE CARE OF THINGS AT HOME, OR GET ALONG WITH OTHER PEOPLE: SOMEWHAT DIFFICULT
4. TROUBLE RELAXING: MORE THAN HALF THE DAYS
7. FEELING AFRAID AS IF SOMETHING AWFUL MIGHT HAPPEN: MORE THAN HALF THE DAYS
5. BEING SO RESTLESS THAT IT IS HARD TO SIT STILL: MORE THAN HALF THE DAYS
GAD7 TOTAL SCORE: 14
7. FEELING AFRAID AS IF SOMETHING AWFUL MIGHT HAPPEN: MORE THAN HALF THE DAYS
2. NOT BEING ABLE TO STOP OR CONTROL WORRYING: MORE THAN HALF THE DAYS
6. BECOMING EASILY ANNOYED OR IRRITABLE: MORE THAN HALF THE DAYS
GAD7 TOTAL SCORE: 14
1. FEELING NERVOUS, ANXIOUS, OR ON EDGE: MORE THAN HALF THE DAYS
8. IF YOU CHECKED OFF ANY PROBLEMS, HOW DIFFICULT HAVE THESE MADE IT FOR YOU TO DO YOUR WORK, TAKE CARE OF THINGS AT HOME, OR GET ALONG WITH OTHER PEOPLE?: SOMEWHAT DIFFICULT
3. WORRYING TOO MUCH ABOUT DIFFERENT THINGS: MORE THAN HALF THE DAYS

## 2024-07-18 ENCOUNTER — VIRTUAL VISIT (OUTPATIENT)
Dept: PSYCHOLOGY | Facility: CLINIC | Age: 39
End: 2024-07-18
Payer: OTHER GOVERNMENT

## 2024-07-18 DIAGNOSIS — F41.1 GAD (GENERALIZED ANXIETY DISORDER): Primary | ICD-10-CM

## 2024-07-18 PROCEDURE — 90837 PSYTX W PT 60 MINUTES: CPT | Mod: 93
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
## 2024-07-21 ENCOUNTER — HEALTH MAINTENANCE LETTER (OUTPATIENT)
Age: 39
End: 2024-07-21

## 2024-08-08 ENCOUNTER — ANCILLARY PROCEDURE (OUTPATIENT)
Dept: CT IMAGING | Facility: CLINIC | Age: 39
End: 2024-08-08
Attending: INTERNAL MEDICINE
Payer: OTHER GOVERNMENT

## 2024-08-08 ENCOUNTER — NURSE TRIAGE (OUTPATIENT)
Dept: FAMILY MEDICINE | Facility: CLINIC | Age: 39
End: 2024-08-08

## 2024-08-08 ENCOUNTER — OFFICE VISIT (OUTPATIENT)
Dept: FAMILY MEDICINE | Facility: CLINIC | Age: 39
End: 2024-08-08
Payer: OTHER GOVERNMENT

## 2024-08-08 ENCOUNTER — OFFICE VISIT (OUTPATIENT)
Dept: PEDIATRICS | Facility: CLINIC | Age: 39
End: 2024-08-08
Payer: OTHER GOVERNMENT

## 2024-08-08 VITALS
SYSTOLIC BLOOD PRESSURE: 109 MMHG | TEMPERATURE: 98.7 F | HEIGHT: 66 IN | BODY MASS INDEX: 32.68 KG/M2 | HEART RATE: 80 BPM | RESPIRATION RATE: 16 BRPM | DIASTOLIC BLOOD PRESSURE: 76 MMHG | OXYGEN SATURATION: 98 %

## 2024-08-08 VITALS
HEART RATE: 77 BPM | DIASTOLIC BLOOD PRESSURE: 78 MMHG | TEMPERATURE: 98.7 F | RESPIRATION RATE: 16 BRPM | WEIGHT: 199 LBS | HEIGHT: 66 IN | SYSTOLIC BLOOD PRESSURE: 120 MMHG | OXYGEN SATURATION: 99 % | BODY MASS INDEX: 31.98 KG/M2

## 2024-08-08 DIAGNOSIS — R10.31 RLQ ABDOMINAL PAIN: ICD-10-CM

## 2024-08-08 DIAGNOSIS — N73.0 PID (ACUTE PELVIC INFLAMMATORY DISEASE): Primary | ICD-10-CM

## 2024-08-08 DIAGNOSIS — N72 CERVICITIS: ICD-10-CM

## 2024-08-08 DIAGNOSIS — R10.31 RLQ ABDOMINAL PAIN: Primary | ICD-10-CM

## 2024-08-08 LAB
BASOPHILS # BLD AUTO: 0.1 10E3/UL (ref 0–0.2)
BASOPHILS NFR BLD AUTO: 1 %
CLUE CELLS: ABNORMAL
EOSINOPHIL # BLD AUTO: 0.1 10E3/UL (ref 0–0.7)
EOSINOPHIL NFR BLD AUTO: 1 %
ERYTHROCYTE [DISTWIDTH] IN BLOOD BY AUTOMATED COUNT: 13.4 % (ref 10–15)
ERYTHROCYTE [SEDIMENTATION RATE] IN BLOOD BY WESTERGREN METHOD: 3 MM/HR (ref 0–20)
HCG UR QL: NEGATIVE
HCT VFR BLD AUTO: 42 % (ref 35–47)
HGB BLD-MCNC: 13.6 G/DL (ref 11.7–15.7)
IMM GRANULOCYTES # BLD: 0 10E3/UL
IMM GRANULOCYTES NFR BLD: 0 %
LYMPHOCYTES # BLD AUTO: 1.7 10E3/UL (ref 0.8–5.3)
LYMPHOCYTES NFR BLD AUTO: 20 %
MCH RBC QN AUTO: 26.9 PG (ref 26.5–33)
MCHC RBC AUTO-ENTMCNC: 32.4 G/DL (ref 31.5–36.5)
MCV RBC AUTO: 83 FL (ref 78–100)
MONOCYTES # BLD AUTO: 0.6 10E3/UL (ref 0–1.3)
MONOCYTES NFR BLD AUTO: 7 %
NEUTROPHILS # BLD AUTO: 6 10E3/UL (ref 1.6–8.3)
NEUTROPHILS NFR BLD AUTO: 72 %
NRBC # BLD AUTO: 0 10E3/UL
NRBC BLD AUTO-RTO: 0 /100
PLATELET # BLD AUTO: 215 10E3/UL (ref 150–450)
RADIOLOGIST FLAGS: ABNORMAL
RBC # BLD AUTO: 5.05 10E6/UL (ref 3.8–5.2)
TRICHOMONAS, WET PREP: ABNORMAL
WBC # BLD AUTO: 8.4 10E3/UL (ref 4–11)
WBC'S/HIGH POWER FIELD, WET PREP: ABNORMAL
YEAST, WET PREP: ABNORMAL

## 2024-08-08 PROCEDURE — 87210 SMEAR WET MOUNT SALINE/INK: CPT | Performed by: INTERNAL MEDICINE

## 2024-08-08 PROCEDURE — 99215 OFFICE O/P EST HI 40 MIN: CPT | Performed by: INTERNAL MEDICINE

## 2024-08-08 PROCEDURE — 87591 N.GONORRHOEAE DNA AMP PROB: CPT | Performed by: INTERNAL MEDICINE

## 2024-08-08 PROCEDURE — 85652 RBC SED RATE AUTOMATED: CPT | Performed by: INTERNAL MEDICINE

## 2024-08-08 PROCEDURE — 99207 REFERRAL TO ACUTE AND DIAGNOSTIC SERVICES: CPT | Performed by: NURSE PRACTITIONER

## 2024-08-08 PROCEDURE — 85025 COMPLETE CBC W/AUTO DIFF WBC: CPT | Performed by: INTERNAL MEDICINE

## 2024-08-08 PROCEDURE — 36415 COLL VENOUS BLD VENIPUNCTURE: CPT | Performed by: INTERNAL MEDICINE

## 2024-08-08 PROCEDURE — 96372 THER/PROPH/DIAG INJ SC/IM: CPT | Performed by: INTERNAL MEDICINE

## 2024-08-08 PROCEDURE — 81025 URINE PREGNANCY TEST: CPT | Performed by: INTERNAL MEDICINE

## 2024-08-08 PROCEDURE — 74177 CT ABD & PELVIS W/CONTRAST: CPT | Mod: GC | Performed by: RADIOLOGY

## 2024-08-08 PROCEDURE — 87491 CHLMYD TRACH DNA AMP PROBE: CPT | Performed by: INTERNAL MEDICINE

## 2024-08-08 RX ORDER — DOXYCYCLINE 100 MG/1
100 CAPSULE ORAL 2 TIMES DAILY
Qty: 28 CAPSULE | Refills: 0 | Status: SHIPPED | OUTPATIENT
Start: 2024-08-08

## 2024-08-08 RX ORDER — METRONIDAZOLE 500 MG/1
500 TABLET ORAL 2 TIMES DAILY
Qty: 28 TABLET | Refills: 0 | Status: SHIPPED | OUTPATIENT
Start: 2024-08-08 | End: 2024-08-22

## 2024-08-08 RX ORDER — IOPAMIDOL 755 MG/ML
109 INJECTION, SOLUTION INTRAVASCULAR ONCE
Status: COMPLETED | OUTPATIENT
Start: 2024-08-08 | End: 2024-08-08

## 2024-08-08 RX ADMIN — IOPAMIDOL 109 ML: 755 INJECTION, SOLUTION INTRAVASCULAR at 16:26

## 2024-08-08 ASSESSMENT — PAIN SCALES - GENERAL
PAINLEVEL: SEVERE PAIN (6)
PAINLEVEL: SEVERE PAIN (6)

## 2024-08-08 NOTE — PROGRESS NOTES
Acute and Diagnostic Services Clinic Visit        Patria Eng is a 38 year old, presenting for the following health issues:  Abdominal Pain (RLQ, Radiates into Flank)    HPI     Abdominal/Flank Pain  Onset/Duration: Yesterday noticed a dull ache and has worsened since   Description:   Character: Sharp and Stabbing  Location: right lower quadrant  Radiation: Right Flank   Intensity: moderate  Progression of Symptoms:  worsening  Accompanying Signs & Symptoms:  Fever/chills: no   Gas/Bloating: no   Nausea: YES-   Vomitting: no   Diarrhea: no   Constipation:no   Dysuria: no            Hematuria: no            Frequency: no            Incontinence of urine: no   History:            Last bowel movement: yesterday  Trauma: no   Previous similar pain: YES- has had previous episodes of RLQ pain due to ovulation, but has not been this intense before    Previous tests done: none           Previous Abdominal surgery: no   Precipitating factors:   Does the pain change with:     Food: no      Bowel Movement: no     Urination: no              Other factors: no   Therapies tried and outcome:  None     When food last eaten: 11 am today     Right lower quadrant abdominal pain   Albertina Rojas has been monitoring closely as working on trying to conceive.  Noted that she has had cramping pain in the past, but now has had had a persistent twinge for the past day.   She notes prior ectopic pregnancy in May treated medically.  She did have a recent menstrual cycle in July.  She is monitored her LH levels which spiked yesterday and dropping.  She anticipates ovulation tomorrow.  She is confident she is not pregnant currently.   She did have some nasuea last night.  She did go zip lining yesterday and no pain while doing the activity.  Notes that walking currently will casue some pain.  Leaning forward can also trigger the pain.  Laying down will help it to dissipate.  No associated symptoms.  No pain with urination, no change in  "bowel movements.  Pain 10/10 at its peak described as a sharp shooting pain.  Quick \"zap\".      No past medical history on file.    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     Patient Active Problem List   Diagnosis    Cervical neuritis    Joint Pain, Localized In The Wrist    AMELIA (generalized anxiety disorder)    H/O abnormal cervical Papanicolaou smear     History of ecotpic pregnancy    Current Outpatient Medications   Medication Sig Dispense Refill    lactobacillus rhamnosus, GG, (CULTURELL) capsule Take 1 capsule by mouth 2 times daily      Prenatal Vit-Fe Fumarate-FA (PRENATAL MULTIVITAMIN  PLUS IRON) 27-1 MG TABS Take 1 tablet by mouth daily           Allergies   Allergen Reactions    Seasonal Allergies Other (See Comments)     Drainage in back of throat, runny nose     Family History   Problem Relation Age of Onset    Cancer Mother 50.00        skin cancer/basal cell    Hypertension Father     No Known Problems Brother     Anxiety Disorder Son      Social History     Tobacco Use    Smoking status: Never     Passive exposure: Never    Smokeless tobacco: Never   Vaping Use    Vaping status: Never Used   Substance Use Topics    Alcohol use: Not Currently     Comment: Drink socially, no more than 1-2 drinks in a single sitting    Drug use: No       Review of Systems  Constitutional, HEENT, cardiovascular, pulmonary, GI, , musculoskeletal, neuro, skin, endocrine and psych systems are negative, except as otherwise noted.      Objective    /78 (BP Location: Right arm, Patient Position: Sitting, Cuff Size: Adult Large)   Pulse 77   Temp 98.7  F (37.1  C) (Oral)   Resp 16   Ht 1.664 m (5' 5.5\")   Wt 90.3 kg (199 lb)   SpO2 99%   BMI 32.61 kg/m    Body mass index is 32.61 kg/m .  Physical " Exam   GENERAL: alert and no distress  EYES: Eyes grossly normal to inspection,   NECK: no adenopathy, no asymmetry, masses, or scars  RESP: lungs clear to auscultation - no rales, rhonchi or wheezes  CV: regular rate and rhythm, normal S1 S2, no S3 or S4, no murmur, click or rub, no peripheral edema  ABDOMEN: soft, + tenderness with palpation right pelvic area, no hepatosplenomegaly   : Pain with palpation right lower quadrant. Speculum examination reveals some cervical discharge with enlarged cervix.   MS: no gross musculoskeletal defects noted, no edema  SKIN: no suspicious lesions or rashes  NEURO: Normal strength and tone, mentation intact and speech normal  PSYCH: mentation appears normal, affect normal/bright    Results for orders placed or performed in visit on 08/08/24   CT Abdomen Pelvis w Contrast     Status: None (Preliminary result)    Impression    RESIDENT PRELIMINARY INTERPRETATION  IMPRESSION:  Abnormally thickened and enlarged cervix with a cervical inflammation,  concerning for cervicitis. Recommend direct visualization in order to  rule out noninflammatory etiology such as neoplasm.    **Findings communicated to ordering physician Dr. Bradley Davenport MD  via telephone conversation at 4:56 PM on 8/8/2024**    [Critical Result: Cervical enlargement]   Results for orders placed or performed in visit on 08/08/24   HCG qualitative urine     Status: Normal   Result Value Ref Range    hCG Urine Qualitative Negative Negative   Erythrocyte sedimentation rate auto     Status: Normal   Result Value Ref Range    Erythrocyte Sedimentation Rate 3 0 - 20 mm/hr   CBC with platelets and differential     Status: None   Result Value Ref Range    WBC Count 8.4 4.0 - 11.0 10e3/uL    RBC Count 5.05 3.80 - 5.20 10e6/uL    Hemoglobin 13.6 11.7 - 15.7 g/dL    Hematocrit 42.0 35.0 - 47.0 %    MCV 83 78 - 100 fL    MCH 26.9 26.5 - 33.0 pg    MCHC 32.4 31.5 - 36.5 g/dL    RDW 13.4 10.0 - 15.0 %    Platelet Count 215 150 -  450 10e3/uL    % Neutrophils 72 %    % Lymphocytes 20 %    % Monocytes 7 %    % Eosinophils 1 %    % Basophils 1 %    % Immature Granulocytes 0 %    NRBCs per 100 WBC 0 <1 /100    Absolute Neutrophils 6.0 1.6 - 8.3 10e3/uL    Absolute Lymphocytes 1.7 0.8 - 5.3 10e3/uL    Absolute Monocytes 0.6 0.0 - 1.3 10e3/uL    Absolute Eosinophils 0.1 0.0 - 0.7 10e3/uL    Absolute Basophils 0.1 0.0 - 0.2 10e3/uL    Absolute Immature Granulocytes 0.0 <=0.4 10e3/uL    Absolute NRBCs 0.0 10e3/uL   Wet prep - Clinic Collect     Status: Abnormal    Specimen: Vagina; Swab   Result Value Ref Range    Trichomonas Absent Absent    Yeast Absent Absent    Clue Cells Absent Absent    WBCs/high power field 1+ (A) None   CBC with platelets differential     Status: None    Narrative    The following orders were created for panel order CBC with platelets differential.  Procedure                               Abnormality         Status                     ---------                               -----------         ------                     CBC with platelets and d...[[id number]]                      Final result                 Please view results for these tests on the individual orders.      Patient Instructions   (N73.0) PID (acute pelvic inflammatory disease)  (primary encounter diagnosis)  Comment: noted Cervicitis on CT in assocation with right sided pelvic pain 10/10 highly suspicious for pelvic inflammatory disease.  As such we will test with wet prep and GC and chlamydia testing and treat empirically with intamuscular injection of ceftriaxone followed by two weeks os oral doxycycline and metronidazole.  Follow-up with gynecology for CT findings as a repeat pelvic examination is recommended   Plan: cefTRIAXone (ROCEPHIN) in NS for IM         administration 500 mg, doxycycline hyclate         (VIBRAMYCIN) 100 MG capsule, metroNIDAZOLE         (FLAGYL) 500 MG tablet            (R10.31) RLQ abdominal pain  Comment: as above - CT findings  consistent with suspicion for pelvic inflammatory disease   Plan: HCG qualitative urine, CBC with platelets         differential, Erythrocyte sedimentation rate         auto, CT Abdomen Pelvis w Contrast, CANCELED:         C. difficile Toxin B PCR with reflex to C.         difficile Antigen and Toxins A/B EIA            (N72) Cervicitis  Comment: check labs today   Plan: Wet prep - Clinic Collect, NEISSERIA GONORRHOEA        PCR, CHLAMYDIA TRACHOMATIS PCR                  40 minutes spent with patient.  Over 50% of time counseling      Signed Electronically by: Bradley Davenport MD, MD

## 2024-08-08 NOTE — PATIENT INSTRUCTIONS
(N73.0) PID (acute pelvic inflammatory disease)  (primary encounter diagnosis)  Comment: noted Cervicitis on CT in assocation with right sided pelvic pain 10/10 highly suspicious for pelvic inflammatory disease.  As such we will test with wet prep and GC and chlamydia testing and treat empirically with intamuscular injection of ceftriaxone followed by two weeks os oral doxycycline and metronidazole.  Follow-up with gynecology for CT findings as a repeat pelvic examination is recommended   Plan: cefTRIAXone (ROCEPHIN) in NS for IM         administration 500 mg, doxycycline hyclate         (VIBRAMYCIN) 100 MG capsule, metroNIDAZOLE         (FLAGYL) 500 MG tablet            (R10.31) RLQ abdominal pain  Comment: as above - CT findings consistent with suspicion for pelvic inflammatory disease   Plan: HCG qualitative urine, CBC with platelets         differential, Erythrocyte sedimentation rate         auto, CT Abdomen Pelvis w Contrast, CANCELED:         C. difficile Toxin B PCR with reflex to C.         difficile Antigen and Toxins A/B EIA            (N72) Cervicitis  Comment: check labs today   Plan: Wet prep - Clinic Collect, NEISSERIA GONORRHOEA        PCR, CHLAMYDIA TRACHOMATIS PCR

## 2024-08-08 NOTE — PROGRESS NOTES
"  Assessment & Plan     RLQ abdominal pain  Patient referred to [facility] for acute work-up.   - Referral to Acute and Diagnostic Services (Day of diagnostic / First order acute); Future          BMI  Estimated body mass index is 32.68 kg/m  as calculated from the following:    Height as of this encounter: 1.664 m (5' 5.5\").    Weight as of 4/24/24: 90.4 kg (199 lb 6.4 oz).         Patria Eng is a 38 year old, presenting for the following health issues:  Abdominal Pain        8/8/2024     1:52 PM   Additional Questions   Roomed by juan     History of Present Illness       Reason for visit:  I am having right side pains. I am actively TTC and figured it was ovulation pain, but i was nauseous yesterday and it seems to be getting more intense.  Symptom onset:  1-3 days ago  Symptoms include:  Right side pain. Lower right abdomen and right side above hip bone.  Symptom intensity:  Moderate  Symptom progression:  Worsening  Had these symptoms before:  Yes  Has tried/received treatment for these symptoms:  No  What makes it worse:  No, sitting down is more uncomfortable than standing up.  What makes it better:  No    She eats 4 or more servings of fruits and vegetables daily.She consumes 0 sweetened beverage(s) daily.She exercises with enough effort to increase her heart rate 20 to 29 minutes per day.  She exercises with enough effort to increase her heart rate 5 days per week.   She is taking medications regularly.     Referral to Acute and Diagnostic Services    [phone] ([place]) [address] [address]    Transition to Acute & Diagnostic Services Clinic has been discussed with patient, and she agrees with next level of care.   Patient understands that evaluation/treatment at [facility] typically takes significantly longer than in clinic/urgent care (>2 hours).  [facility] has been contacted by provider/staff to confirm patient " "acceptance.     The following provider has assessed this patient for intervention at [facility], and directed the patient for referral: Jerica Montalvo CNP        RLQ abdominal pain since last night, started to feel nauseated.  No vomiting. Pain getting worse throughout the night.   Was pretty uncomfortable at work today with pain, having a hard time getting comfortable even with sitting.  No nausea today.    LMP 7/26/24.      No fever  No diarrhea.  Sometimes some constipation, tries to drink more water.   No urinary symptoms.    Has appendix.   No hx of kidney stone.     No CP or SOB.           Review of Systems  Constitutional, HEENT, cardiovascular, pulmonary, gi and gu systems are negative, except as otherwise noted.      Objective    /76   Pulse 80   Temp 98.7  F (37.1  C)   Resp 16   Ht 1.664 m (5' 5.5\")   SpO2 98%   BMI 32.68 kg/m    Body mass index is 32.68 kg/m .  Physical Exam   GENERAL: alert and no distress  EYES: Eyes grossly normal to inspection, PERRL and conjunctivae and sclerae normal  RESP: lungs clear to auscultation - no rales, rhonchi or wheezes  CV: regular rate and rhythm, normal S1 S2, no S3 or S4, no murmur, click or rub, no peripheral edema  ABDOMEN: tenderness RLQ, soft  and bowel sounds normal  MS: no gross musculoskeletal defects noted, no edema            Signed Electronically by: Jerica Montalvo CNP    "

## 2024-08-08 NOTE — TELEPHONE ENCOUNTER
"Nurse Triage SBAR    Is this a 2nd Level Triage? YES, LICENSED PRACTITIONER REVIEW IS REQUIRED    Situation: Pt calling re: RLQ pain.    Background: Says the pain started yesterday. Was achy on and off all day and then got worse toward the end of the day. She is actively trying to conceive and thought maybe she is just ovulating.     Assessment: Really uncomfortable to sit. Pain ranges from 4-7, sometimes a 10 that lasts a second or two. Pain comes and goes, but isn't \"unbearable.\" Had some nausea last night, but no vomiting. No bloody, black or tarry BM's. Feels a little constipated. Has not taken any medication for the pain.     Protocol Recommended Disposition:   See in Office Today    Recommendation: Recommend pt evaluation in person today in office. Scheduled for OV this afternoon with a provider at the [facility]. Pt advised to call back if she develops any new or worsening symptoms.     Jaja Mercer RN, BSN  [facility]           Reason for Disposition   MODERATE pain (e.g., interferes with normal activities that comes and goes (cramps) lasts > 24 hours  (Exception: Pain with Vomiting or Diarrhea - see that Protocol.)    Additional Information   Negative: Passed out (i.e., fainted, collapsed and was not responding)   Negative: Shock suspected (e.g., cold/pale/clammy skin, too weak to stand, low BP, rapid pulse)   Negative: Sounds like a life-threatening emergency to the triager   Negative: Followed an abdomen (stomach) injury   Negative: Chest pain   Negative: Abdominal pain and pregnant < 20 weeks   Negative: Abdominal pain and pregnant 20 or more weeks   Negative: Pain is mainly in upper abdomen (if needed ask: 'is it mainly above the belly button?')   Negative: Abdomen bloating or swelling are main symptoms   Negative: SEVERE abdominal pain (e.g., excruciating)   Negative: Vomiting red blood or black (coffee ground) material   Negative: Blood in bowel movements  (Exception: Blood on surface of " BM with constipation.)   Negative: Black or tarry bowel movements  (Exception: Chronic-unchanged black-grey BMs AND is taking iron pills or Pepto-Bismol.)   Negative: MILD TO MODERATE constant pain lasting > 2 hours   Negative: Vomiting bile (green color)   Negative: Patient sounds very sick or weak to the triager   Negative: Vomiting and abdomen looks much more swollen than usual   Negative: White of the eyes have turned yellow (i.e., jaundice)   Negative: Blood in urine (red, pink, or tea-colored)   Negative: Fever > 103 F (39.4 C)   Negative: Fever > 101 F (38.3 C) and over 60 years of age   Negative: Fever > 100.0 F (37.8 C) and has diabetes mellitus or a weak immune system (e.g., HIV positive, cancer chemotherapy, organ transplant, splenectomy, chronic steroids)   Negative: Fever > 100.0 F (37.8 C) and bedridden (e.g., CVA, chronic illness, recovering from surgery)   Negative: Pregnant or could be pregnant (i.e., missed last menstrual period)    Protocols used: Abdominal Pain - Female-A-[place]

## 2024-08-09 LAB
C TRACH DNA SPEC QL NAA+PROBE: NEGATIVE
N GONORRHOEA DNA SPEC QL NAA+PROBE: NEGATIVE

## 2024-08-09 NOTE — RESULT ENCOUNTER NOTE
Jason Eng,    I have had the opportunity to review your recent results and an interpretation is as follows:  As noted, your CT did show enlargement of the cervix and as we discussed, follow up in gynecology is recommended     Sincerely,  Bradley Davenport MD

## 2024-08-09 NOTE — RESULT ENCOUNTER NOTE
Albertina  I have reviewed your recent test results:    -Chlamydia and gonnohrea tests are normal.    For additional lab test information, www.testing.com is an excellent reference.    Healthy regards,     Jaimie Marrero MBA, MS, PA-C  [facility]

## 2024-08-19 ENCOUNTER — LAB REQUISITION (OUTPATIENT)
Dept: LAB | Facility: CLINIC | Age: 39
End: 2024-08-19
Payer: OTHER GOVERNMENT

## 2024-08-19 DIAGNOSIS — O20.0 THREATENED ABORTION: ICD-10-CM

## 2024-08-19 PROCEDURE — 84702 CHORIONIC GONADOTROPIN TEST: CPT | Mod: ORL | Performed by: OBSTETRICS & GYNECOLOGY

## 2024-08-20 ENCOUNTER — VIRTUAL VISIT (OUTPATIENT)
Dept: PSYCHOLOGY | Facility: CLINIC | Age: 39
End: 2024-08-20
Payer: OTHER GOVERNMENT

## 2024-08-20 DIAGNOSIS — F41.1 GAD (GENERALIZED ANXIETY DISORDER): Primary | ICD-10-CM

## 2024-08-20 LAB — HCG INTACT+B SERPL-ACNC: 42 MIU/ML

## 2024-08-20 PROCEDURE — 90837 PSYTX W PT 60 MINUTES: CPT | Mod: 93

## 2024-08-20 ASSESSMENT — ANXIETY QUESTIONNAIRES
8. IF YOU CHECKED OFF ANY PROBLEMS, HOW DIFFICULT HAVE THESE MADE IT FOR YOU TO DO YOUR WORK, TAKE CARE OF THINGS AT HOME, OR GET ALONG WITH OTHER PEOPLE?: SOMEWHAT DIFFICULT
GAD7 TOTAL SCORE: 10
GAD7 TOTAL SCORE: 10
2. NOT BEING ABLE TO STOP OR CONTROL WORRYING: MORE THAN HALF THE DAYS
6. BECOMING EASILY ANNOYED OR IRRITABLE: SEVERAL DAYS
3. WORRYING TOO MUCH ABOUT DIFFERENT THINGS: MORE THAN HALF THE DAYS
4. TROUBLE RELAXING: MORE THAN HALF THE DAYS
IF YOU CHECKED OFF ANY PROBLEMS ON THIS QUESTIONNAIRE, HOW DIFFICULT HAVE THESE PROBLEMS MADE IT FOR YOU TO DO YOUR WORK, TAKE CARE OF THINGS AT HOME, OR GET ALONG WITH OTHER PEOPLE: SOMEWHAT DIFFICULT
5. BEING SO RESTLESS THAT IT IS HARD TO SIT STILL: SEVERAL DAYS
7. FEELING AFRAID AS IF SOMETHING AWFUL MIGHT HAPPEN: NOT AT ALL
GAD7 TOTAL SCORE: 10
1. FEELING NERVOUS, ANXIOUS, OR ON EDGE: MORE THAN HALF THE DAYS
7. FEELING AFRAID AS IF SOMETHING AWFUL MIGHT HAPPEN: NOT AT ALL
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
## 2024-08-21 ENCOUNTER — LAB REQUISITION (OUTPATIENT)
Dept: LAB | Facility: CLINIC | Age: 39
End: 2024-08-21
Payer: OTHER GOVERNMENT

## 2024-08-21 DIAGNOSIS — O20.0 THREATENED ABORTION: ICD-10-CM

## 2024-08-21 LAB — HCG INTACT+B SERPL-ACNC: 113 MIU/ML

## 2024-08-21 PROCEDURE — 84702 CHORIONIC GONADOTROPIN TEST: CPT | Mod: ORL | Performed by: OBSTETRICS & GYNECOLOGY

## 2024-09-11 ENCOUNTER — VIRTUAL VISIT (OUTPATIENT)
Dept: PSYCHOLOGY | Facility: CLINIC | Age: 39
End: 2024-09-11
Payer: OTHER GOVERNMENT

## 2024-09-11 DIAGNOSIS — F41.1 GAD (GENERALIZED ANXIETY DISORDER): Primary | ICD-10-CM

## 2024-09-11 PROCEDURE — 90837 PSYTX W PT 60 MINUTES: CPT | Mod: 95
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
## 2024-09-13 ENCOUNTER — LAB REQUISITION (OUTPATIENT)
Dept: LAB | Facility: CLINIC | Age: 39
End: 2024-09-13
Payer: OTHER GOVERNMENT

## 2024-09-13 ENCOUNTER — TRANSFERRED RECORDS (OUTPATIENT)
Dept: HEALTH INFORMATION MANAGEMENT | Facility: CLINIC | Age: 39
End: 2024-09-13

## 2024-09-13 DIAGNOSIS — Z36.9 ENCOUNTER FOR ANTENATAL SCREENING, UNSPECIFIED: ICD-10-CM

## 2024-09-13 LAB
BASOPHILS # BLD AUTO: 0 10E3/UL (ref 0–0.2)
BASOPHILS NFR BLD AUTO: 0 %
EOSINOPHIL # BLD AUTO: 0.1 10E3/UL (ref 0–0.7)
EOSINOPHIL NFR BLD AUTO: 1 %
ERYTHROCYTE [DISTWIDTH] IN BLOOD BY AUTOMATED COUNT: 13.5 % (ref 10–15)
HBA1C MFR BLD: 5.5 %
HBV SURFACE AG SERPL QL IA: NONREACTIVE
HCT VFR BLD AUTO: 41.1 % (ref 35–47)
HCV AB SERPL QL IA: NONREACTIVE
HGB BLD-MCNC: 13.5 G/DL (ref 11.7–15.7)
IMM GRANULOCYTES # BLD: 0 10E3/UL
IMM GRANULOCYTES NFR BLD: 0 %
LYMPHOCYTES # BLD AUTO: 2.5 10E3/UL (ref 0.8–5.3)
LYMPHOCYTES NFR BLD AUTO: 25 %
MCH RBC QN AUTO: 27.2 PG (ref 26.5–33)
MCHC RBC AUTO-ENTMCNC: 32.8 G/DL (ref 31.5–36.5)
MCV RBC AUTO: 83 FL (ref 78–100)
MONOCYTES # BLD AUTO: 0.8 10E3/UL (ref 0–1.3)
MONOCYTES NFR BLD AUTO: 8 %
NEUTROPHILS # BLD AUTO: 6.6 10E3/UL (ref 1.6–8.3)
NEUTROPHILS NFR BLD AUTO: 66 %
NRBC # BLD AUTO: 0 10E3/UL
NRBC BLD AUTO-RTO: 0 /100
PLATELET # BLD AUTO: 258 10E3/UL (ref 150–450)
RBC # BLD AUTO: 4.97 10E6/UL (ref 3.8–5.2)
RUBV IGG SERPL QL IA: 0.86 INDEX
RUBV IGG SERPL QL IA: NORMAL
T PALLIDUM AB SER QL: NONREACTIVE
WBC # BLD AUTO: 9.9 10E3/UL (ref 4–11)

## 2024-09-13 PROCEDURE — 86803 HEPATITIS C AB TEST: CPT | Mod: ORL | Performed by: ADVANCED PRACTICE MIDWIFE

## 2024-09-13 PROCEDURE — 87086 URINE CULTURE/COLONY COUNT: CPT | Mod: ORL | Performed by: ADVANCED PRACTICE MIDWIFE

## 2024-09-13 PROCEDURE — 86780 TREPONEMA PALLIDUM: CPT | Mod: ORL | Performed by: ADVANCED PRACTICE MIDWIFE

## 2024-09-13 PROCEDURE — 87389 HIV-1 AG W/HIV-1&-2 AB AG IA: CPT | Mod: ORL | Performed by: ADVANCED PRACTICE MIDWIFE

## 2024-09-13 PROCEDURE — 83036 HEMOGLOBIN GLYCOSYLATED A1C: CPT | Mod: ORL | Performed by: ADVANCED PRACTICE MIDWIFE

## 2024-09-13 PROCEDURE — 86762 RUBELLA ANTIBODY: CPT | Mod: ORL | Performed by: ADVANCED PRACTICE MIDWIFE

## 2024-09-13 PROCEDURE — 87340 HEPATITIS B SURFACE AG IA: CPT | Mod: ORL | Performed by: ADVANCED PRACTICE MIDWIFE

## 2024-09-13 PROCEDURE — 86900 BLOOD TYPING SEROLOGIC ABO: CPT | Mod: ORL | Performed by: ADVANCED PRACTICE MIDWIFE

## 2024-09-13 PROCEDURE — 85025 COMPLETE CBC W/AUTO DIFF WBC: CPT | Mod: ORL | Performed by: ADVANCED PRACTICE MIDWIFE

## 2024-09-14 LAB
ABO/RH(D): NORMAL
ANTIBODY SCREEN: NEGATIVE
HIV 1+2 AB+HIV1 P24 AG SERPL QL IA: NONREACTIVE
SPECIMEN EXPIRATION DATE: NORMAL

## 2024-09-15 LAB — BACTERIA UR CULT: NORMAL

## 2024-10-08 ENCOUNTER — VIRTUAL VISIT (OUTPATIENT)
Dept: PSYCHOLOGY | Facility: CLINIC | Age: 39
End: 2024-10-08
Payer: OTHER GOVERNMENT

## 2024-10-08 DIAGNOSIS — F41.1 GAD (GENERALIZED ANXIETY DISORDER): Primary | ICD-10-CM

## 2024-10-08 PROCEDURE — 90837 PSYTX W PT 60 MINUTES: CPT | Mod: 95

## 2024-10-08 ASSESSMENT — ANXIETY QUESTIONNAIRES
6. BECOMING EASILY ANNOYED OR IRRITABLE: NEARLY EVERY DAY
IF YOU CHECKED OFF ANY PROBLEMS ON THIS QUESTIONNAIRE, HOW DIFFICULT HAVE THESE PROBLEMS MADE IT FOR YOU TO DO YOUR WORK, TAKE CARE OF THINGS AT HOME, OR GET ALONG WITH OTHER PEOPLE: SOMEWHAT DIFFICULT
3. WORRYING TOO MUCH ABOUT DIFFERENT THINGS: SEVERAL DAYS
GAD7 TOTAL SCORE: 9
GAD7 TOTAL SCORE: 9
2. NOT BEING ABLE TO STOP OR CONTROL WORRYING: MORE THAN HALF THE DAYS
4. TROUBLE RELAXING: SEVERAL DAYS
5. BEING SO RESTLESS THAT IT IS HARD TO SIT STILL: NOT AT ALL
7. FEELING AFRAID AS IF SOMETHING AWFUL MIGHT HAPPEN: NOT AT ALL
1. FEELING NERVOUS, ANXIOUS, OR ON EDGE: MORE THAN HALF THE DAYS
8. IF YOU CHECKED OFF ANY PROBLEMS, HOW DIFFICULT HAVE THESE MADE IT FOR YOU TO DO YOUR WORK, TAKE CARE OF THINGS AT HOME, OR GET ALONG WITH OTHER PEOPLE?: SOMEWHAT DIFFICULT
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
## 2024-10-11 ENCOUNTER — TRANSFERRED RECORDS (OUTPATIENT)
Dept: HEALTH INFORMATION MANAGEMENT | Facility: CLINIC | Age: 39
End: 2024-10-11
Payer: OTHER GOVERNMENT

## 2024-10-17 ENCOUNTER — MEDICAL CORRESPONDENCE (OUTPATIENT)
Dept: HEALTH INFORMATION MANAGEMENT | Facility: CLINIC | Age: 39
End: 2024-10-17
Payer: OTHER GOVERNMENT

## 2024-10-17 ENCOUNTER — TRANSCRIBE ORDERS (OUTPATIENT)
Dept: MATERNAL FETAL MEDICINE | Facility: CLINIC | Age: 39
End: 2024-10-17
Payer: OTHER GOVERNMENT

## 2024-10-17 DIAGNOSIS — O26.90 PREGNANCY RELATED CONDITION, ANTEPARTUM: Primary | ICD-10-CM

## 2024-10-21 ENCOUNTER — PRE VISIT (OUTPATIENT)
Dept: MATERNAL FETAL MEDICINE | Facility: CLINIC | Age: 39
End: 2024-10-21
Payer: OTHER GOVERNMENT

## 2024-10-28 ENCOUNTER — OFFICE VISIT (OUTPATIENT)
Dept: MATERNAL FETAL MEDICINE | Facility: CLINIC | Age: 39
End: 2024-10-28
Attending: OBSTETRICS & GYNECOLOGY
Payer: OTHER GOVERNMENT

## 2024-10-28 ENCOUNTER — HOSPITAL ENCOUNTER (OUTPATIENT)
Dept: ULTRASOUND IMAGING | Facility: CLINIC | Age: 39
Discharge: HOME | End: 2024-10-28
Attending: OBSTETRICS & GYNECOLOGY
Payer: OTHER GOVERNMENT

## 2024-10-28 DIAGNOSIS — O26.90 PREGNANCY RELATED CONDITION, ANTEPARTUM: ICD-10-CM

## 2024-10-28 DIAGNOSIS — O09.521 MULTIGRAVIDA OF ADVANCED MATERNAL AGE IN FIRST TRIMESTER: Primary | ICD-10-CM

## 2024-10-28 PROCEDURE — 76801 OB US < 14 WKS SINGLE FETUS: CPT | Mod: 26 | Performed by: STUDENT IN AN ORGANIZED HEALTH CARE EDUCATION/TRAINING PROGRAM

## 2024-10-28 PROCEDURE — 96040 HC GENETIC COUNSELING, EACH 30 MINUTES: CPT | Performed by: GENETIC COUNSELOR, MS

## 2024-10-28 PROCEDURE — 76801 OB US < 14 WKS SINGLE FETUS: CPT

## 2024-10-28 NOTE — PROGRESS NOTES
"[facility]  Genetic Counseling Consult    Patient:  Albertina Rojas YOB: 1985   Date of Service:  10/28/24   MRN: [id number]    Albertina was seen at the [facility] [place] for genetic counseling consultation as part of her appointment for level II comprehensive ultrasound due to advanced maternal age.  The patient was accompanied by her partner, Shamir to today's visit.     IMPRESSION/ PLAN   1. Albertina underwent Panorama NIPT earlier in this pregnancy, which was low risk. She is aware diagnostic testing remains available.     2. Albertina had an NT ultrasound today.  Please see the ultrasound report for further details.    3. An 18-20 week comprehensive ultrasound is standard of care for all women 35 or older at delivery.    PREGNANCY HISTORY   /Parity:    Age at Delivery: 39 year old  Gestational Age: 13w2d   DANIEL: 5/3/2025, by Last Menstrual Period             No significant complications or exposures were reported in the current pregnancy. This is Albertina and Shamir's first pregnancy together.   Albertina's pregnancy history is significant for:   Two term vaginal deliveries    MEDICAL HISTORY   Albertina s reported medical history is not expected to impact pregnancy management or risks to fetal development.       FAMILY HISTORY   A three-generation pedigree was obtained today and is scanned under the \"Media\" tab in Epic. It is important to note that the family history provided is based on the patient's recollection and reported in the absence of medical records. If the family history changes or if more information is obtained, the patient should contact our clinic as this may alter recommendations.     The following significant findings were reported today:   It was reported that Shamir's paternal aunt passed away in her 60's from cardiac arrest. We discussed how sudden cardiac arrest can be concerning for a " possible underlying cardiac condition. Some cardiomyopathies and arrhythmias can be due to an underlying genetic cause that can be passed through families in an autosomal dominant fashion. Shamir was encouraged to share this family history information with his primary care provider in order to ensure appropriate screening.     Otherwise, the reported family history is unremarkable for multiple miscarriages, stillbirths, birth defects, intellectual disabilities, known genetic conditions, and consanguinity.       CARRIER SCREENING   Expanded carrier screening is available to screen for autosomal recessive conditions and X-linked conditions in a large list of genes. Autosomal recessive conditions happen when a mutation has been inherited from the egg and sperm and include conditions like cystic fibrosis, thalassemia, hearing loss, spinal muscular atrophy, and more. X-linked conditions happen when a mutation has been inherited from the egg and include conditions like fragile X syndrome.  screening was also reviewed.    The patient did not elect to pursue the carrier screening options discussed today, however is aware these remain available.        RISK ASSESSMENT FOR CHROMOSOME CONDITIONS   We explained that the risk for fetal chromosome abnormalities increases with maternal age. We discussed specific features of common chromosome abnormalities, including Down syndrome, trisomy 13, trisomy 18, and sex chromosome trisomies.    At age 39 at midtrimester, the risk to have a baby with Down syndrome is 1 in 168.   At age 39 at midtrimester, the risk to have a baby with any chromosome abnormality is 1 in 82.     Albertina underwent Panorama NIPT earlier in this pregnancy. We reviewed with result in detail today. Results are negative for trisomy 21, trisomy 18, trisomy 13, monosomy X, and triploidy. This puts her current pregnancy at low risk for these conditions. Although these results are reassuring, this does not  replace a standard chromosome analysis from a chorionic villus sampling or amniocentesis.     GENETIC TESTING OPTIONS   Genetic testing during a pregnancy includes screening and diagnostic procedures.      We discussed the following ultrasound options:  Nuchal translucency (NT) ultrasound  Ultrasound between [id number] that includes nuchal translucency measurement and nasal bone assessments  Nuchal translucency refers to the space at the back of the neck where fluid builds up. All babies at this stage have fluid and there is only concern if there is too much fluid  Nasal bone refers to the small bone in the nose. There is concern for conditions like Down syndrome if the bone cannot be seen at all  This ultrasound can be done as part of first trimester screening, at the same time as another screen (NIPT), at the same time as a CVS, or if the patients does not want genetic screening.  Markers on ultrasound detects about 70% of pregnancies with aneuploidy  Abnormalities on NT ultrasound can also increase the risk for a birth defect, like a heart defect    Comprehensive level II ultrasound (Fetal Anatomy Ultrasound)  Ultrasound done between 18-20 weeks gestation  Screens for major birth defects and markers for aneuploidy (like trisomy 21 and trisomy 18)  Includes looking at the fetus/baby's growth, heart, organs (stomach, kidneys), placenta, and amniotic fluid    We discussed the following diagnostic options:   Chorionic villus sampling (CVS)  Invasive diagnostic procedure done between 10w0d and 13w6d  The procedure collects a small sample from the placenta for the purpose of chromosomal testing and/or other genetic testing  Diagnostic result; more than 99% sensitivity for fetal chromosome abnormalities  Cannot screen for open neural tube defects, maternal serum AFP after 15 weeks is recommended    Amniocentesis  Invasive diagnostic procedure done after 15 weeks gestation  The procedure collects a small sample of  amniotic fluid for the purpose of chromosomal testing and/or other genetic testing  Diagnostic result; more than 99% sensitivity for fetal chromosome abnormalities  Testing for AFP in the amniotic fluid can test for open neural tube defects    The benefits and limitations of noninvasive screening were reviewed. Screening tests provide a risk assessment (chance) specific to the pregnancy for certain fetal chromosome abnormalities but cannot definitively diagnose or exclude a fetal chromosome abnormality. Follow-up genetic counseling and consideration of diagnostic testing is recommended with any abnormal screening result. Diagnostic testing during a pregnancy is more certain and can test for more conditions. However, the tests do have a risk of miscarriage that requires careful consideration. These tests can detect fetal chromosome abnormalities with greater than 99% certainty. Results can be compromised by maternal cell contamination or mosaicism and are limited by the resolution of current genetic testing technology. There is no screening or diagnostic test that detects all forms of birth defects or intellectual disability.     It was a pleasure to be involved with Albertina cuba. Face-to-face time of the meeting was 18 minutes.    Ania Escobar MS, [facility]  Licensed Genetic Counselor  [facility]  Maternal Fetal Medicine  Ph: [phone]  [email or web address]

## 2024-10-28 NOTE — PROGRESS NOTES
The patient was seen for an ultrasound in the [facility] today.  For a detailed report of the ultrasound examination, please see the ultrasound report which can be found under the imaging tab.    If you have questions regarding today's evaluation or if we can be of further service, please contact the [facility] Center.    Kathi Galdamez MD  , OB/GYN  Maternal-Fetal Medicine

## 2024-10-28 NOTE — NURSING NOTE
Patient presents to [facility] for GC/NT at 13w2d due to AMA. Denies LOF, vaginal bleeding, cramping/contractions. SBAR given to KVNG MD, see their note in Epic.

## 2024-11-03 ASSESSMENT — ANXIETY QUESTIONNAIRES
4. TROUBLE RELAXING: SEVERAL DAYS
5. BEING SO RESTLESS THAT IT IS HARD TO SIT STILL: NOT AT ALL
GAD7 TOTAL SCORE: 7
IF YOU CHECKED OFF ANY PROBLEMS ON THIS QUESTIONNAIRE, HOW DIFFICULT HAVE THESE PROBLEMS MADE IT FOR YOU TO DO YOUR WORK, TAKE CARE OF THINGS AT HOME, OR GET ALONG WITH OTHER PEOPLE: SOMEWHAT DIFFICULT
3. WORRYING TOO MUCH ABOUT DIFFERENT THINGS: NOT AT ALL
8. IF YOU CHECKED OFF ANY PROBLEMS, HOW DIFFICULT HAVE THESE MADE IT FOR YOU TO DO YOUR WORK, TAKE CARE OF THINGS AT HOME, OR GET ALONG WITH OTHER PEOPLE?: SOMEWHAT DIFFICULT
GAD7 TOTAL SCORE: 7
1. FEELING NERVOUS, ANXIOUS, OR ON EDGE: SEVERAL DAYS
6. BECOMING EASILY ANNOYED OR IRRITABLE: MORE THAN HALF THE DAYS
2. NOT BEING ABLE TO STOP OR CONTROL WORRYING: MORE THAN HALF THE DAYS
7. FEELING AFRAID AS IF SOMETHING AWFUL MIGHT HAPPEN: SEVERAL DAYS

## 2024-11-08 ENCOUNTER — OFFICE VISIT (OUTPATIENT)
Dept: PSYCHOLOGY | Facility: CLINIC | Age: 39
End: 2024-11-08
Payer: OTHER GOVERNMENT

## 2024-11-08 DIAGNOSIS — F41.1 GENERALIZED ANXIETY DISORDER: Primary | ICD-10-CM

## 2024-11-08 DIAGNOSIS — F43.9 TRAUMA AND STRESSOR-RELATED DISORDER: ICD-10-CM

## 2024-11-08 PROCEDURE — 90785 PSYTX COMPLEX INTERACTIVE: CPT

## 2024-11-08 PROCEDURE — 90837 PSYTX W PT 60 MINUTES: CPT
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
## 2024-11-11 ENCOUNTER — LAB REQUISITION (OUTPATIENT)
Dept: LAB | Facility: CLINIC | Age: 39
End: 2024-11-11
Payer: OTHER GOVERNMENT

## 2024-11-11 DIAGNOSIS — Z13.79 ENCOUNTER FOR OTHER SCREENING FOR GENETIC AND CHROMOSOMAL ANOMALIES: ICD-10-CM

## 2024-11-11 PROCEDURE — 82105 ALPHA-FETOPROTEIN SERUM: CPT | Mod: ORL | Performed by: OBSTETRICS & GYNECOLOGY

## 2024-11-13 LAB
# FETUSES US: NORMAL
AFP MOM SERPL: 0.75
AFP SERPL-MCNC: 18 NG/ML
AGE - REPORTED: 37.7 YR
CURRENT SMOKER: NO
FAMILY MEMBER DISEASES HX: NORMAL
GA METHOD: NORMAL
GA: NORMAL WK
IDDM PATIENT QL: NO
INTEGRATED SCN PATIENT-IMP: NORMAL
SPECIMEN DRAWN SERPL: NORMAL

## 2024-11-25 ENCOUNTER — TRANSFERRED RECORDS (OUTPATIENT)
Dept: HEALTH INFORMATION MANAGEMENT | Facility: CLINIC | Age: 39
End: 2024-11-25
Payer: OTHER GOVERNMENT

## 2024-12-02 ENCOUNTER — HOSPITAL ENCOUNTER (OUTPATIENT)
Dept: ULTRASOUND IMAGING | Facility: CLINIC | Age: 39
Discharge: HOME | End: 2024-12-02
Attending: STUDENT IN AN ORGANIZED HEALTH CARE EDUCATION/TRAINING PROGRAM
Payer: OTHER GOVERNMENT

## 2024-12-02 ENCOUNTER — OFFICE VISIT (OUTPATIENT)
Dept: MATERNAL FETAL MEDICINE | Facility: CLINIC | Age: 39
End: 2024-12-02
Attending: STUDENT IN AN ORGANIZED HEALTH CARE EDUCATION/TRAINING PROGRAM
Payer: OTHER GOVERNMENT

## 2024-12-02 DIAGNOSIS — O09.521 MULTIGRAVIDA OF ADVANCED MATERNAL AGE IN FIRST TRIMESTER: ICD-10-CM

## 2024-12-02 DIAGNOSIS — Z36.89 ENCOUNTER FOR FETAL ANATOMIC SURVEY: ICD-10-CM

## 2024-12-02 DIAGNOSIS — O09.522 MULTIGRAVIDA OF ADVANCED MATERNAL AGE IN SECOND TRIMESTER: Primary | ICD-10-CM

## 2024-12-02 PROCEDURE — 76811 OB US DETAILED SNGL FETUS: CPT

## 2024-12-02 PROCEDURE — 76811 OB US DETAILED SNGL FETUS: CPT | Mod: 26 | Performed by: STUDENT IN AN ORGANIZED HEALTH CARE EDUCATION/TRAINING PROGRAM

## 2024-12-02 PROCEDURE — 99213 OFFICE O/P EST LOW 20 MIN: CPT | Mod: 25 | Performed by: STUDENT IN AN ORGANIZED HEALTH CARE EDUCATION/TRAINING PROGRAM

## 2024-12-02 NOTE — NURSING NOTE
Patient presents to [facility] for L2 US at 18w2d due to AMA. Denies LOF, vaginal bleeding, cramping/contractions. SBAR given to KVNG BAKER, see their note in Epic.

## 2024-12-02 NOTE — PROGRESS NOTES
The patient was seen for an ultrasound in the [facility] today.  For a detailed report of the ultrasound examination, please see the ultrasound report which can be found under the imaging tab.     If you have questions regarding today's evaluation or if we can be of further service, please contact the [facility].    Guadalupe Garza MD  Maternal Fetal Medicine

## 2024-12-09 ENCOUNTER — OFFICE VISIT (OUTPATIENT)
Dept: PSYCHOLOGY | Facility: CLINIC | Age: 39
End: 2024-12-09
Payer: OTHER GOVERNMENT

## 2024-12-09 DIAGNOSIS — F41.1 GENERALIZED ANXIETY DISORDER: Primary | ICD-10-CM

## 2024-12-09 DIAGNOSIS — F43.9 TRAUMA AND STRESSOR-RELATED DISORDER: ICD-10-CM

## 2024-12-09 PROCEDURE — 90837 PSYTX W PT 60 MINUTES: CPT

## 2024-12-09 PROCEDURE — 90785 PSYTX COMPLEX INTERACTIVE: CPT

## 2024-12-09 NOTE — PROGRESS NOTES
"[facility]                                     Progress Note    Patient Name: Albertina Rojas  Date: 12/9/24         Service Type: Individual      Session Start Time: 836A  Session End Time: 929A     Session Length: 53 minutes    Session #: 2    Attendees: Client attended alone    Service Modality:  In-person    DATA  Interactive Complexity: Yes, visit entailed Interactive Complexity evidenced by:  -The need to manage maladaptive communication (related to, e.g., high anxiety, high reactivity, repeated questions, or disagreement) among participants that complicates delivery of care  Crisis: No        Progress Since Last Session (Related to Symptoms / Goals / Homework):   Symptoms: No change maintains hyperverbalizations and hyperactivity; difficulties sleeping and compounded stressors, difficulty to focus and complete tasks    Homework: Completed in session      Episode of Care Goals: No improvement - PREPARATION (Decided to change - considering how); Intervened by negotiating a change plan and determining options / strategies for behavior change, identifying triggers, exploring social supports, and working towards setting a date to begin behavior change     Current / Ongoing Stressors and Concerns:     Pt reported continued and increased strain with the co-parenting dynamics with her ex and Owen. Pt was tearful throughout session and reflected on sense of helplessness regarding communication struggles in their relationship, generalizing this to the rest of Owen's childhood and how much anxiety, worry, and frustration it will continue to cause. Pt spoke about identified differences in the two households, writer and pt processed through how this can impact pt's son and what she can do to try and support him that is within her control. Pt reflected on her job, her different \"front\" she has at work compared to parenting her son, processed dynamics of a coworker that she struggles to work " alongside. Pt reports not getting good sleep and how she is trying to utilize healthy self-care and advocacy strategies, assertive communication where she can. Pt is interested in her son receiving counseling, possibly play therapy - writer gave recommendations for this, processed benefits of it and what it could mean in helping son navigate current adjustments within family unit.     Treatment Objective(s) Addressed in This Session:   use cognitive strategies identified in therapy to challenge anxious thoughts  Increase interest, engagement, and pleasure in doing things  Decrease frequency and intensity of feeling down, depressed, hopeless  Improve quantity and quality of night time sleep / decrease daytime naps  Feel less tired and more energy during the day   Identify negative self-talk and behaviors: challenge core beliefs, myths, and actions  Improve concentration, focus, and mindfulness in daily activities   Feel less fidgety, restless or slow in daily activities / interpersonal interactions  Trauma sx mgmt     Intervention:   CBT: reframe negative thinking; challenge anxious thoughts  Interpersonal Therapy: healthy boundary setting, assertive communication    Motivational Interviewing    MI Intervention: Expressed Empathy/Understanding, Supported Autonomy, Collaboration, Evocation, Permission to raise concern or advise, Open-ended questions, and Reflections: simple and complex     Change Talk Expressed by the Patient: Desire to change Ability to change Reasons to change Need to change Committment to change    Provider Response to Change Talk: E - Evoked more info from patient about behavior change, A - Affirmed patient's thoughts, decisions, or attempts at behavior change, R - Reflected patient's change talk, and S - Summarized patient's change talk statements    Assessments completed prior to visit:    PHQ2:       12/8/2024     1:23 PM 7/18/2024     2:33 PM 4/18/2024     2:25 PM 1/3/2024     2:11 PM  11/29/2023     2:55 PM 11/1/2023     2:04 PM 7/20/2023     2:31 PM   PHQ-2 ( 1999 [employer])   Q1: Little interest or pleasure in doing things 0  0  0  0  0  0  0    Q2: Feeling down, depressed or hopeless 0  0  0  0  0  0  0    PHQ-2 Score 0  0 0 0 0 0 0   Q1: Little interest or pleasure in doing things Not at all Not at all Not at all Not at all Not at all Not at all Not at all   Q2: Feeling down, depressed or hopeless Not at all Not at all Not at all Not at all Not at all Not at all Not at all   PHQ-2 Score 0 0 0 0 0 0 0       Patient-reported      GAD2:       4/18/2024     2:25 PM 6/6/2024    10:07 AM 7/13/2024     9:39 PM 8/20/2024     2:36 PM 10/8/2024     2:59 PM 11/3/2024     5:27 PM 12/4/2024    10:39 AM   AMELIA-2   Feeling nervous, anxious, or on edge 1  1  2  2  2  1  1    Not being able to stop or control worrying 1  1  2  2  2  2  1    AMELIA-2 Total Score 2 2 4 4 4 3  2        Patient-reported      ASSESSMENT: Current Emotional / Mental Status (status of significant symptoms):   Risk status (Self / Other harm or suicidal ideation)  Patient denies current homicidal ideation and behaviors.  Patient denies current self-injurious ideation and behaviors.    Patient denied risk behaviors associated with substance use.  Patient denies any high risk behaviors associated with mental health symptoms.  Patient reports the following current concerns for their personal safety: None.  Patient reports there are firearms in the house.     yes, they are secured. The firearms are secured in a locked space.   Recommended that patient call 911 or go to the local ED should there be a change in any of these risk factors    Current Mental Status Exam:   Appearance:                Appropriate    Eye Contact:               Good   Psychomotor:              Normal       Gait / station:           seated  Attitude / Demeanor:   Cooperative  Pleasant  Speech      Rate / Production:   Normal/ Responsive      Volume:                   Normal   volume      Language:               intact  Mood:                          Anxious   Affect:                          Tearful   Thought Content:        Clear   Thought Process:        Coherent       Associations:           No loosening of associations  Insight:                         Fair   Judgment:                   Intact   Orientation:                 All  Attention/concentration:          Fair     Medication Review:   No changes to current psychiatric medication(s)    Current Outpatient Medications   Medication Sig Dispense Refill    doxycycline hyclate (VIBRAMYCIN) 100 MG capsule Take 1 capsule (100 mg) by mouth 2 times daily 28 capsule 0    lactobacillus rhamnosus, GG, (CULTURELL) capsule Take 1 capsule by mouth 2 times daily      Prenatal Vit-Fe Fumarate-FA (PRENATAL MULTIVITAMIN  PLUS IRON) 27-1 MG TABS Take 1 tablet by mouth daily       Current Facility-Administered Medications   Medication Dose Route Frequency Provider Last Rate Last Admin    triamcinolone acetonide (KENALOG-10) injection 10 mg  10 mg INTRA-ARTICULAR Once Mason Marte DPM             Medication Compliance:   NA     Changes in Health Issues:   None reported     Chemical Use Review:   Substance Use: Chemical use reviewed, no active concerns identified      Tobacco Use: No current tobacco use.      Diagnosis:  1. Generalized anxiety disorder    2. Trauma and stressor-related disorder      Collateral Reports Completed:   Not Applicable    PLAN: (Patient Tasks / Therapist Tasks / Other)    Pt was encouraged to continue utilizing healthy boundary setting and assertive communication, considerations for decreasing compounded stressors that is within her control.      Rocío Potts, [facility]  12/9/24                                                       ______________________________________________________________________    Individual Treatment Plan    Patient's Name: Albetrina Rojas  YOB: 1985    Date of Creation:  12/9/24  Date Treatment Plan Last Reviewed/Revised: NA    DSM5 Diagnoses:   300.02 (F41.1) Generalized Anxiety Disorder with depressive sx and panic attacks  309.9 (F43.9) Unspecified Trauma and Stressor Related Disorder     Psychosocial / Contextual Factors:   Trauma history, relational stressors    PROMIS (reviewed every 90 days): 11/3/24    PROMIS 10-Global Health (only subscores and total score):       7/20/2023     2:35 PM 11/1/2023     2:05 PM 11/29/2023     2:56 PM 3/7/2024     2:26 PM 3/21/2024     9:23 AM 7/13/2024     9:40 PM 11/3/2024     5:27 PM   PROMIS-10 Scores Only   Global Mental Health Score 12 12 13 12 12 13 12    Global Physical Health Score 11 15 16 16 16 14 16    PROMIS TOTAL - SUBSCORES 23 27 29 28 28 27 28        Patient-reported        Referral / Collaboration:  Referral to another professional/service is not indicated at this time..    Anticipated number of session for this episode of care: 6-9 sessions  Anticipation frequency of session: Every other week  Anticipated Duration of each session: 38-52 minutes  Treatment plan will be reviewed in 90 days or when goals have been changed.       MeasurableTreatment Goal(s) related to diagnosis / functional impairment(s)    Goal:  Patient will reduce symptoms and impacts of anxiety - generalized anxiety; effectively reduce anxiety symptoms as evidenced by a reduced GAD7 score of 5 or less with the occurrence of several days or less.    Objective #A:  will experience a reduction in anxiety, will develop  more effective coping skills to manage anxiety symptoms, will develop healthy cognitive patterns and beliefs and will increase ability to function adaptively              Client will use cognitive strategies identified in therapy to challenge anxious thoughts.  Status: New - Date: 12/9/24      Objective #B:  will experience a reduction in anxiety, will develop more effective coping skills to manage anxiety symptoms, will develop healthy cognitive  patterns and beliefs and will increase ability to function adaptively  Client will use relaxation strategies many times per day to reduce the physical symptoms of anxiety.  Status: New - Date: 12/9/24     Goal: Patient will experience a reduction in trauma sx; pt will identify and utilize healthy strategies to better manage trauma sx.    Objective #A (Patient Action)    Patient will identify 3 healthy coping strategies to manage panic sx.  Status: New - Date: 12/9/24     Objective #B  Patient will identify 3 sleep hygiene practices.  Status: New - Date: 12/9/24     Objective #C  Patient will identify 3 strategies to more effectively address stressors.  Status: New - Date: 12/9/24     Intervention(s)  Psycho-education regarding mental health diagnoses and treatment options    Skills training  Explore skills useful to client in current situation  Skills include assertiveness, communication, conflict management, problem-solving, relaxation, etc.    Solution-Focused Therapy  Explore patterns in patient's relationships and discussed options for new behaviors  Explore patterns in patient's actions and choices and discussed options for new behaviors    Cognitive-behavioral Therapy  Discuss common cognitive distortions, identified them in patient's life  Explore ways to challenge, replace, and act against these cognitions    Acceptance and Commitment Therapy  Explore and identified important values in patient's life  Discuss ways to commit to behavioral activation around these values    Psychodynamic psychotherapy  Discuss patient's emotional dynamics and issues and how they impact behaviors  Explore patient's history of relationships and how they impact present behaviors  Explore how to work with and make changes in these schemas and patterns    Behavioral Activation  Discuss steps patient can take to become more involved in meaningful activity  Identify barriers to these activities and explored possible  solutions    Mindfulness-Based Strategies  Discuss skills based on development and application of mindfulness  Skills drawn from dialectical behavior therapy, mindfulness-based stress reduction, mindfulness-based cognitive therapy, etc.       Rocío Potts, [facility]  December 9, 2024

## 2024-12-23 ENCOUNTER — OFFICE VISIT (OUTPATIENT)
Dept: PSYCHOLOGY | Facility: CLINIC | Age: 39
End: 2024-12-23
Payer: OTHER GOVERNMENT

## 2024-12-23 DIAGNOSIS — F43.9 TRAUMA AND STRESSOR-RELATED DISORDER: ICD-10-CM

## 2024-12-23 DIAGNOSIS — F41.1 GAD (GENERALIZED ANXIETY DISORDER): ICD-10-CM

## 2024-12-23 DIAGNOSIS — F41.1 GENERALIZED ANXIETY DISORDER: Primary | ICD-10-CM

## 2024-12-23 PROCEDURE — 90837 PSYTX W PT 60 MINUTES: CPT

## 2024-12-23 PROCEDURE — 90785 PSYTX COMPLEX INTERACTIVE: CPT

## 2024-12-23 ASSESSMENT — ANXIETY QUESTIONNAIRES
1. FEELING NERVOUS, ANXIOUS, OR ON EDGE: MORE THAN HALF THE DAYS
8. IF YOU CHECKED OFF ANY PROBLEMS, HOW DIFFICULT HAVE THESE MADE IT FOR YOU TO DO YOUR WORK, TAKE CARE OF THINGS AT HOME, OR GET ALONG WITH OTHER PEOPLE?: SOMEWHAT DIFFICULT
GAD7 TOTAL SCORE: 7
7. FEELING AFRAID AS IF SOMETHING AWFUL MIGHT HAPPEN: NOT AT ALL
IF YOU CHECKED OFF ANY PROBLEMS ON THIS QUESTIONNAIRE, HOW DIFFICULT HAVE THESE PROBLEMS MADE IT FOR YOU TO DO YOUR WORK, TAKE CARE OF THINGS AT HOME, OR GET ALONG WITH OTHER PEOPLE: SOMEWHAT DIFFICULT
GAD7 TOTAL SCORE: 7
3. WORRYING TOO MUCH ABOUT DIFFERENT THINGS: NOT AT ALL
4. TROUBLE RELAXING: SEVERAL DAYS
GAD7 TOTAL SCORE: 7
2. NOT BEING ABLE TO STOP OR CONTROL WORRYING: MORE THAN HALF THE DAYS
7. FEELING AFRAID AS IF SOMETHING AWFUL MIGHT HAPPEN: NOT AT ALL
5. BEING SO RESTLESS THAT IT IS HARD TO SIT STILL: NOT AT ALL
6. BECOMING EASILY ANNOYED OR IRRITABLE: MORE THAN HALF THE DAYS

## 2024-12-23 NOTE — PROGRESS NOTES
[facility]                                     Progress Note    Patient Name: Albertina Rojas  Date: 12/23/24         Service Type: Individual      Session Start Time: 8:32A  Session End Time: 932A     Session Length: 60 minutes    Session #: 3    Attendees: Client attended alone    Service Modality:  In-person    DATA  Interactive Complexity: Yes, visit entailed Interactive Complexity evidenced by:  -The need to manage maladaptive communication (related to, e.g., high anxiety, high reactivity, repeated questions, or disagreement) among participants that complicates delivery of care  Crisis: No        Progress Since Last Session (Related to Symptoms / Goals / Homework):   Symptoms: No change maintains hyperverbalizations and hyperactivity; difficulties sleeping and compounded stressors, difficulty to focus and complete tasks    Homework: Completed in session      Episode of Care Goals: No improvement - PREPARATION (Decided to change - considering how); Intervened by negotiating a change plan and determining options / strategies for behavior change, identifying triggers, exploring social supports, and working towards setting a date to begin behavior change     Current / Ongoing Stressors and Concerns:     Pt reported continued and increased strain with the co-parenting dynamics with her ex and Owen. Pt was tearful throughout session and reflected on sense of helplessness regarding communication struggles in their relationship, generalizing this to the rest of Owen's childhood and how much anxiety, worry, and frustration it will continue to cause. Pt is interested in her son receiving therapy, processed the continued conflict with pt son's dad about needing therapy and what this looks like, including dynamics of most recent PCP visit for son with Dad on the phone for appt. Pt spoke about identified differences in the two households, writer and pt processed through how this can impact pt's son  and what she can do to try and support him that is within her control. Pt reflected on her job, increased stressors and shifts in roles and recruiting/retention. Pt reports not getting good sleep and how she is trying to utilize healthy self-care and advocacy strategies, assertive communication where she can. Pt expressed how she is helping herself relax, allocate her responsibilities and cope with compounded things out of her control where she can, her partner supporting her where he can. Writer and pt worked on cognitive restructuring, practicing reframing the negative thought through acceptance and acknowledging where the appreciation in the topic can be seen to help feel senses of control and gratitude.      Treatment Objective(s) Addressed in This Session:   use cognitive strategies identified in therapy to challenge anxious thoughts  Increase interest, engagement, and pleasure in doing things  Decrease frequency and intensity of feeling down, depressed, hopeless  Improve quantity and quality of night time sleep / decrease daytime naps  Feel less tired and more energy during the day   Identify negative self-talk and behaviors: challenge core beliefs, myths, and actions  Improve concentration, focus, and mindfulness in daily activities   Feel less fidgety, restless or slow in daily activities / interpersonal interactions  Trauma sx mgmt     Intervention:   CBT: reframe negative thinking; challenge anxious thoughts  Interpersonal Therapy: healthy boundary setting, assertive communication    Motivational Interviewing    MI Intervention: Expressed Empathy/Understanding, Supported Autonomy, Collaboration, Evocation, Permission to raise concern or advise, Open-ended questions, and Reflections: simple and complex     Change Talk Expressed by the Patient: Desire to change Ability to change Reasons to change Need to change Committment to change    Provider Response to Change Talk: E - Evoked more info from patient about  behavior change, A - Affirmed patient's thoughts, decisions, or attempts at behavior change, R - Reflected patient's change talk, and S - Summarized patient's change talk statements    Assessments completed prior to visit:    PHQ2:       12/23/2024     8:03 AM 12/8/2024     1:23 PM 7/18/2024     2:33 PM 4/18/2024     2:25 PM 1/3/2024     2:11 PM 11/29/2023     2:55 PM 11/1/2023     2:04 PM   PHQ-2 ( 1999 [employer])   Q1: Little interest or pleasure in doing things 0 0 0 0 0 0 0   Q2: Feeling down, depressed or hopeless 1 0 0 0 0 0 0   PHQ-2 Score 1  0  0 0 0 0 0   Q1: Little interest or pleasure in doing things Not at all Not at all Not at all Not at all Not at all Not at all Not at all   Q2: Feeling down, depressed or hopeless Several days Not at all Not at all Not at all Not at all Not at all Not at all   PHQ-2 Score 1 0 0 0 0 0 0       Patient-reported      GAD2:       6/6/2024    10:07 AM 7/13/2024     9:39 PM 8/20/2024     2:36 PM 10/8/2024     2:59 PM 11/3/2024     5:27 PM 12/4/2024    10:39 AM 12/23/2024     8:04 AM   AMELIA-2   Feeling nervous, anxious, or on edge 1 2 2 2 1 1 2   Not being able to stop or control worrying 1 2 2 2 2 1 2   AMELIA-2 Total Score 2 4 4 4 3  2  4        Patient-reported      ASSESSMENT: Current Emotional / Mental Status (status of significant symptoms):   Risk status (Self / Other harm or suicidal ideation)  Patient denies current homicidal ideation and behaviors.  Patient denies current self-injurious ideation and behaviors.    Patient denied risk behaviors associated with substance use.  Patient denies any high risk behaviors associated with mental health symptoms.  Patient reports the following current concerns for their personal safety: None.  Patient reports there are firearms in the house.     yes, they are secured. The firearms are secured in a locked space.   Recommended that patient call 911 or go to the local ED should there be a change in any of these risk factors    Current  Mental Status Exam:   Appearance:                Appropriate    Eye Contact:               Good   Psychomotor:              Normal       Gait / station:           seated  Attitude / Demeanor:   Cooperative  Pleasant  Speech      Rate / Production:   Normal/ Responsive      Volume:                   Normal  volume      Language:               intact  Mood:                          Anxious   Affect:                          Tearful   Thought Content:        Clear   Thought Process:        Coherent       Associations:           No loosening of associations  Insight:                         Fair   Judgment:                   Intact   Orientation:                 All  Attention/concentration:          Fair     Medication Review:   No changes to current psychiatric medication(s)    Current Outpatient Medications   Medication Sig Dispense Refill    doxycycline hyclate (VIBRAMYCIN) 100 MG capsule Take 1 capsule (100 mg) by mouth 2 times daily 28 capsule 0    lactobacillus rhamnosus, GG, (CULTURELL) capsule Take 1 capsule by mouth 2 times daily      Prenatal Vit-Fe Fumarate-FA (PRENATAL MULTIVITAMIN  PLUS IRON) 27-1 MG TABS Take 1 tablet by mouth daily       Current Facility-Administered Medications   Medication Dose Route Frequency Provider Last Rate Last Admin    triamcinolone acetonide (KENALOG-10) injection 10 mg  10 mg INTRA-ARTICULAR Once Mason Marte DPM             Medication Compliance:   NA     Changes in Health Issues:   None reported     Chemical Use Review:   Substance Use: Chemical use reviewed, no active concerns identified      Tobacco Use: No current tobacco use.      Diagnosis:  1. Generalized anxiety disorder    2. Trauma and stressor-related disorder    3. AMELIA (generalized anxiety disorder)      Collateral Reports Completed:   Not Applicable    PLAN: (Patient Tasks / Therapist Tasks / Other)    Pt was encouraged to continue utilizing healthy boundary setting and assertive communication,  considerations for decreasing compounded stressors that is within her control.      Rocío Potts, LICSW  12/23/24                                                       ______________________________________________________________________    Individual Treatment Plan    Patient's Name: Albertina Rojas  YOB: 1985    Date of Creation: 12/9/24  Date Treatment Plan Last Reviewed/Revised: NA    DSM5 Diagnoses:   300.02 (F41.1) Generalized Anxiety Disorder with depressive sx and panic attacks  309.9 (F43.9) Unspecified Trauma and Stressor Related Disorder     Psychosocial / Contextual Factors:   Trauma history, relational stressors    PROMIS (reviewed every 90 days): 11/3/24    PROMIS 10-Global Health (only subscores and total score):       7/20/2023     2:35 PM 11/1/2023     2:05 PM 11/29/2023     2:56 PM 3/7/2024     2:26 PM 3/21/2024     9:23 AM 7/13/2024     9:40 PM 11/3/2024     5:27 PM   PROMIS-10 Scores Only   Global Mental Health Score 12 12 13 12 12 13 12    Global Physical Health Score 11 15 16 16 16 14 16    PROMIS TOTAL - SUBSCORES 23 27 29 28 28 27 28        Patient-reported        Referral / Collaboration:  Referral to another professional/service is not indicated at this time..    Anticipated number of session for this episode of care: 6-9 sessions  Anticipation frequency of session: Every other week  Anticipated Duration of each session: 38-52 minutes  Treatment plan will be reviewed in 90 days or when goals have been changed.       MeasurableTreatment Goal(s) related to diagnosis / functional impairment(s)    Goal:  Patient will reduce symptoms and impacts of anxiety - generalized anxiety; effectively reduce anxiety symptoms as evidenced by a reduced GAD7 score of 5 or less with the occurrence of several days or less.    Objective #A:  will experience a reduction in anxiety, will develop  more effective coping skills to manage anxiety symptoms, will develop healthy cognitive patterns  and beliefs and will increase ability to function adaptively              Client will use cognitive strategies identified in therapy to challenge anxious thoughts.  Status: New - Date: 12/9/24      Objective #B:  will experience a reduction in anxiety, will develop more effective coping skills to manage anxiety symptoms, will develop healthy cognitive patterns and beliefs and will increase ability to function adaptively  Client will use relaxation strategies many times per day to reduce the physical symptoms of anxiety.  Status: New - Date: 12/9/24     Goal: Patient will experience a reduction in trauma sx; pt will identify and utilize healthy strategies to better manage trauma sx.    Objective #A (Patient Action)    Patient will identify 3 healthy coping strategies to manage panic sx.  Status: New - Date: 12/9/24     Objective #B  Patient will identify 3 sleep hygiene practices.  Status: New - Date: 12/9/24     Objective #C  Patient will identify 3 strategies to more effectively address stressors.  Status: New - Date: 12/9/24     Intervention(s)  Psycho-education regarding mental health diagnoses and treatment options    Skills training  Explore skills useful to client in current situation  Skills include assertiveness, communication, conflict management, problem-solving, relaxation, etc.    Solution-Focused Therapy  Explore patterns in patient's relationships and discussed options for new behaviors  Explore patterns in patient's actions and choices and discussed options for new behaviors    Cognitive-behavioral Therapy  Discuss common cognitive distortions, identified them in patient's life  Explore ways to challenge, replace, and act against these cognitions    Acceptance and Commitment Therapy  Explore and identified important values in patient's life  Discuss ways to commit to behavioral activation around these values    Psychodynamic psychotherapy  Discuss patient's emotional dynamics and issues and how they  impact behaviors  Explore patient's history of relationships and how they impact present behaviors  Explore how to work with and make changes in these schemas and patterns    Behavioral Activation  Discuss steps patient can take to become more involved in meaningful activity  Identify barriers to these activities and explored possible solutions    Mindfulness-Based Strategies  Discuss skills based on development and application of mindfulness  Skills drawn from dialectical behavior therapy, mindfulness-based stress reduction, mindfulness-based cognitive therapy, etc.       Rocío Potts, [facility]  December 9, 2024

## 2025-01-14 LAB
GLUCOSE, 1 HR. OB: 101 MG/DL (ref 60–139)
HEMOGLOBIN (EXTERNAL): 12.4 G/DL (ref 12–15)
TREPONEMA PALLIDUM ANTIBODY (EXTERNAL): NONREACTIVE

## 2025-01-22 ASSESSMENT — ANXIETY QUESTIONNAIRES
4. TROUBLE RELAXING: NEARLY EVERY DAY
7. FEELING AFRAID AS IF SOMETHING AWFUL MIGHT HAPPEN: NEARLY EVERY DAY
6. BECOMING EASILY ANNOYED OR IRRITABLE: MORE THAN HALF THE DAYS
5. BEING SO RESTLESS THAT IT IS HARD TO SIT STILL: NOT AT ALL
3. WORRYING TOO MUCH ABOUT DIFFERENT THINGS: NOT AT ALL
1. FEELING NERVOUS, ANXIOUS, OR ON EDGE: NEARLY EVERY DAY
GAD7 TOTAL SCORE: 14
2. NOT BEING ABLE TO STOP OR CONTROL WORRYING: NEARLY EVERY DAY
8. IF YOU CHECKED OFF ANY PROBLEMS, HOW DIFFICULT HAVE THESE MADE IT FOR YOU TO DO YOUR WORK, TAKE CARE OF THINGS AT HOME, OR GET ALONG WITH OTHER PEOPLE?: VERY DIFFICULT
IF YOU CHECKED OFF ANY PROBLEMS ON THIS QUESTIONNAIRE, HOW DIFFICULT HAVE THESE PROBLEMS MADE IT FOR YOU TO DO YOUR WORK, TAKE CARE OF THINGS AT HOME, OR GET ALONG WITH OTHER PEOPLE: VERY DIFFICULT
GAD7 TOTAL SCORE: 14

## 2025-01-27 ENCOUNTER — OFFICE VISIT (OUTPATIENT)
Dept: PSYCHOLOGY | Facility: CLINIC | Age: 40
End: 2025-01-27
Payer: COMMERCIAL

## 2025-01-27 DIAGNOSIS — F41.1 GENERALIZED ANXIETY DISORDER: Primary | ICD-10-CM

## 2025-01-27 DIAGNOSIS — F43.9 TRAUMA AND STRESSOR-RELATED DISORDER: ICD-10-CM

## 2025-01-27 PROCEDURE — 90837 PSYTX W PT 60 MINUTES: CPT

## 2025-01-27 PROCEDURE — 90785 PSYTX COMPLEX INTERACTIVE: CPT

## 2025-01-27 NOTE — PROGRESS NOTES
[facility]                                     Progress Note    Patient Name: Albertina Rojas  Date: 1/27/25         Service Type: Individual      Session Start Time: 8:32A  Session End Time: 934A     Session Length: 62 minutes    Session #: 4    Attendees: Client attended alone    Service Modality:  In-person    DATA  Interactive Complexity: Yes, visit entailed Interactive Complexity evidenced by:  -The need to manage maladaptive communication (related to, e.g., high anxiety, high reactivity, repeated questions, or disagreement) among participants that complicates delivery of care  Crisis: No  Extended Session (60+ minutes): PROLONGED SERVICE IN THE OUTPATIENT SETTING REQUIRING DIRECT (FACE-TO-FACE) PATIENT CONTACT BEYOND THE USUAL SERVICE:    - Patient's presenting concerns require more intensive intervention than could be completed within the usual service    - High distress and under complex circumstances.  See Data section for details          Progress Since Last Session (Related to Symptoms / Goals / Homework):   Symptoms: No change maintains hyperverbalizations and hyperactivity; difficulties sleeping and compounded stressors, difficulty to focus and complete tasks    Homework: Completed in session      Episode of Care Goals: No improvement - PREPARATION (Decided to change - considering how); Intervened by negotiating a change plan and determining options / strategies for behavior change, identifying triggers, exploring social supports, and working towards setting a date to begin behavior change     Current / Ongoing Stressors and Concerns:    Pt reported continued and increased strain with the co-parenting dynamics with her ex and Owen. Pt said her ex is trying to return to court to change the custody arrangement and pt reflected on these increased worries and stressors. Pt was tearful throughout session and required much compassionate presence and contemplative silence by writer  alongside validation of verbalized feelings. Pt reflected on sense of helplessness regarding communication struggles in their relationship, generalizing this to the rest of Owen's childhood and how much anxiety, worry, and frustration it will continue to cause. Pt went into further family dynamics this session and self-identity difficulties, sense of belonging and feeling accepted/understood by particular family members. Pt reports not getting good sleep and how she is trying to utilize healthy self-care and advocacy strategies, assertive communication where she can. Pt expressed how she is helping herself relax, allocate her responsibilities and cope with compounded things out of her control where she can, her partner supporting her where he can. Pt was able to express example of more optimistic thinking displayed at work she commended herself for. Writer and pt worked on cognitive restructuring, practicing reframing the negative thought through acceptance and acknowledging where the appreciation in the topic can be seen to help feel senses of control and gratitude.      Treatment Objective(s) Addressed in This Session:   use cognitive strategies identified in therapy to challenge anxious thoughts  Increase interest, engagement, and pleasure in doing things  Decrease frequency and intensity of feeling down, depressed, hopeless  Improve quantity and quality of night time sleep / decrease daytime naps  Feel less tired and more energy during the day   Identify negative self-talk and behaviors: challenge core beliefs, myths, and actions  Improve concentration, focus, and mindfulness in daily activities   Feel less fidgety, restless or slow in daily activities / interpersonal interactions  Trauma sx mgmt     Intervention:   CBT: reframe negative thinking; challenge anxious thoughts  Interpersonal Therapy: healthy boundary setting, assertive communication    Motivational Interviewing    MI Intervention: Expressed  Empathy/Understanding, Supported Autonomy, Collaboration, Evocation, Permission to raise concern or advise, Open-ended questions, and Reflections: simple and complex     Change Talk Expressed by the Patient: Desire to change Ability to change Reasons to change Need to change Committment to change    Provider Response to Change Talk: E - Evoked more info from patient about behavior change, A - Affirmed patient's thoughts, decisions, or attempts at behavior change, R - Reflected patient's change talk, and S - Summarized patient's change talk statements    Assessments completed prior to visit:    PHQ2:       12/23/2024     8:03 AM 12/8/2024     1:23 PM 7/18/2024     2:33 PM 4/18/2024     2:25 PM 1/3/2024     2:11 PM 11/29/2023     2:55 PM 11/1/2023     2:04 PM   PHQ-2 ( 1999 [employer])   Q1: Little interest or pleasure in doing things 0 0 0 0 0 0 0   Q2: Feeling down, depressed or hopeless 1 0 0 0 0 0 0   PHQ-2 Score 1  0  0 0 0 0 0   Q1: Little interest or pleasure in doing things Not at all Not at all Not at all Not at all Not at all Not at all Not at all   Q2: Feeling down, depressed or hopeless Several days Not at all Not at all Not at all Not at all Not at all Not at all   PHQ-2 Score 1 0 0 0 0 0 0       Patient-reported      GAD2:       7/13/2024     9:39 PM 8/20/2024     2:36 PM 10/8/2024     2:59 PM 11/3/2024     5:27 PM 12/4/2024    10:39 AM 12/23/2024     8:04 AM 1/22/2025     9:16 AM   AMELIA-2   Feeling nervous, anxious, or on edge 2 2 2 1 1 2 3   Not being able to stop or control worrying 2 2 2 2 1 2 3   AMELIA-2 Total Score 4 4 4 3  2  4  6        Patient-reported      ASSESSMENT: Current Emotional / Mental Status (status of significant symptoms):   Risk status (Self / Other harm or suicidal ideation)  Patient denies current homicidal ideation and behaviors.  Patient denies current self-injurious ideation and behaviors.    Patient denied risk behaviors associated with substance use.  Patient denies any high risk  behaviors associated with mental health symptoms.  Patient reports the following current concerns for their personal safety: None.  Patient reports there are firearms in the house.     yes, they are secured. The firearms are secured in a locked space.   Recommended that patient call 911 or go to the local ED should there be a change in any of these risk factors    Current Mental Status Exam:   Appearance:                Appropriate    Eye Contact:               Good   Psychomotor:              Normal       Gait / station:           seated  Attitude / Demeanor:   Cooperative  Pleasant  Speech      Rate / Production:   Normal/ Responsive      Volume:                   Normal  volume      Language:               intact  Mood:                          Anxious   Affect:                          Tearful   Thought Content:        Clear   Thought Process:        Coherent       Associations:           No loosening of associations  Insight:                         Fair   Judgment:                   Intact   Orientation:                 All  Attention/concentration:          Fair     Medication Review:   No changes to current psychiatric medication(s)    Current Outpatient Medications   Medication Sig Dispense Refill    doxycycline hyclate (VIBRAMYCIN) 100 MG capsule Take 1 capsule (100 mg) by mouth 2 times daily 28 capsule 0    lactobacillus rhamnosus, GG, (CULTURELL) capsule Take 1 capsule by mouth 2 times daily      Prenatal Vit-Fe Fumarate-FA (PRENATAL MULTIVITAMIN  PLUS IRON) 27-1 MG TABS Take 1 tablet by mouth daily       Current Facility-Administered Medications   Medication Dose Route Frequency Provider Last Rate Last Admin    triamcinolone acetonide (KENALOG-10) injection 10 mg  10 mg INTRA-ARTICULAR Once Mason Marte DPM             Medication Compliance:   NA     Changes in Health Issues:   None reported     Chemical Use Review:   Substance Use: Chemical use reviewed, no active concerns identified       Tobacco Use: No current tobacco use.      Diagnosis:  1. Generalized anxiety disorder    2. Trauma and stressor-related disorder      Collateral Reports Completed:   Not Applicable    PLAN: (Patient Tasks / Therapist Tasks / Other)    Pt was encouraged to continue utilizing healthy boundary setting and assertive communication, considerations for decreasing compounded stressors that is within her control. Writer also encouraged pt to practice self-care, self-josé and self-acceptance, leaning on chosen healthy supports in her life and remind self what she has control over, accept what can't be controlled.    Rocío Potts, [facility]  1/27/25                                                       ______________________________________________________________________    Individual Treatment Plan    Patient's Name: Albertina Rojas  YOB: 1985    Date of Creation: 12/9/24  Date Treatment Plan Last Reviewed/Revised: NA    DSM5 Diagnoses:   300.02 (F41.1) Generalized Anxiety Disorder with depressive sx and panic attacks  309.9 (F43.9) Unspecified Trauma and Stressor Related Disorder     Psychosocial / Contextual Factors:   Trauma history, relational stressors    PROMIS (reviewed every 90 days): 11/3/24    PROMIS 10-Global Health (only subscores and total score):       7/20/2023     2:35 PM 11/1/2023     2:05 PM 11/29/2023     2:56 PM 3/7/2024     2:26 PM 3/21/2024     9:23 AM 7/13/2024     9:40 PM 11/3/2024     5:27 PM   PROMIS-10 Scores Only   Global Mental Health Score 12 12 13 12 12 13 12    Global Physical Health Score 11 15 16 16 16 14 16    PROMIS TOTAL - SUBSCORES 23 27 29 28 28 27 28        Patient-reported        Referral / Collaboration:  Referral to another professional/service is not indicated at this time..    Anticipated number of session for this episode of care: 6-9 sessions  Anticipation frequency of session: Every other week  Anticipated Duration of each session: 38-52  minutes  Treatment plan will be reviewed in 90 days or when goals have been changed.       MeasurableTreatment Goal(s) related to diagnosis / functional impairment(s)    Goal:  Patient will reduce symptoms and impacts of anxiety - generalized anxiety; effectively reduce anxiety symptoms as evidenced by a reduced GAD7 score of 5 or less with the occurrence of several days or less.    Objective #A:  will experience a reduction in anxiety, will develop  more effective coping skills to manage anxiety symptoms, will develop healthy cognitive patterns and beliefs and will increase ability to function adaptively              Client will use cognitive strategies identified in therapy to challenge anxious thoughts.  Status: New - Date: 12/9/24      Objective #B:  will experience a reduction in anxiety, will develop more effective coping skills to manage anxiety symptoms, will develop healthy cognitive patterns and beliefs and will increase ability to function adaptively  Client will use relaxation strategies many times per day to reduce the physical symptoms of anxiety.  Status: New - Date: 12/9/24     Goal: Patient will experience a reduction in trauma sx; pt will identify and utilize healthy strategies to better manage trauma sx.    Objective #A (Patient Action)    Patient will identify 3 healthy coping strategies to manage panic sx.  Status: New - Date: 12/9/24     Objective #B  Patient will identify 3 sleep hygiene practices.  Status: New - Date: 12/9/24     Objective #C  Patient will identify 3 strategies to more effectively address stressors.  Status: New - Date: 12/9/24     Intervention(s)  Psycho-education regarding mental health diagnoses and treatment options    Skills training  Explore skills useful to client in current situation  Skills include assertiveness, communication, conflict management, problem-solving, relaxation, etc.    Solution-Focused Therapy  Explore patterns in patient's relationships and discussed  options for new behaviors  Explore patterns in patient's actions and choices and discussed options for new behaviors    Cognitive-behavioral Therapy  Discuss common cognitive distortions, identified them in patient's life  Explore ways to challenge, replace, and act against these cognitions    Acceptance and Commitment Therapy  Explore and identified important values in patient's life  Discuss ways to commit to behavioral activation around these values    Psychodynamic psychotherapy  Discuss patient's emotional dynamics and issues and how they impact behaviors  Explore patient's history of relationships and how they impact present behaviors  Explore how to work with and make changes in these schemas and patterns    Behavioral Activation  Discuss steps patient can take to become more involved in meaningful activity  Identify barriers to these activities and explored possible solutions    Mindfulness-Based Strategies  Discuss skills based on development and application of mindfulness  Skills drawn from dialectical behavior therapy, mindfulness-based stress reduction, mindfulness-based cognitive therapy, etc.       Rocío Potts, [facility]  December 9, 2024

## 2025-02-10 ENCOUNTER — OFFICE VISIT (OUTPATIENT)
Dept: PSYCHOLOGY | Facility: CLINIC | Age: 40
End: 2025-02-10
Payer: COMMERCIAL

## 2025-02-10 DIAGNOSIS — F41.1 GENERALIZED ANXIETY DISORDER: Primary | ICD-10-CM

## 2025-02-10 DIAGNOSIS — F43.9 TRAUMA AND STRESSOR-RELATED DISORDER: ICD-10-CM

## 2025-02-10 PROCEDURE — 90837 PSYTX W PT 60 MINUTES: CPT

## 2025-02-11 NOTE — PROGRESS NOTES
[facility]                                     Progress Note    Patient Name: Albertina Rojas  Date: 2/10/25         Service Type: Individual      Session Start Time: 8:30A  Session End Time: 930A     Session Length: 60 minutes    Session #: 5    Attendees: Client attended alone    Service Modality:  In-person    DATA  Interactive Complexity: No  Crisis: No  Extended Session (60+ minutes): PROLONGED SERVICE IN THE OUTPATIENT SETTING REQUIRING DIRECT (FACE-TO-FACE) PATIENT CONTACT BEYOND THE USUAL SERVICE:    - Patient's presenting concerns require more intensive intervention than could be completed within the usual service    - High distress and under complex circumstances.  See Data section for details          Progress Since Last Session (Related to Symptoms / Goals / Homework):   Symptoms: No change maintains hyperverbalizations and hyperactivity; difficulties sleeping and compounded stressors, difficulty to focus and complete tasks    Homework: Completed in session      Episode of Care Goals: No improvement - PREPARATION (Decided to change - considering how); Intervened by negotiating a change plan and determining options / strategies for behavior change, identifying triggers, exploring social supports, and working towards setting a date to begin behavior change     Current / Ongoing Stressors and Concerns:    Pt reported continued and increased strain with the co-parenting dynamics with her ex and Owen. Pt said her ex is trying to return to court to change the custody arrangement and pt reflected on these increased worries and stressors before the year of last arrangement was made. Pt states there has been some disruption in school with Owen and pt tries to reiterate to her ex, per pt, about trying to get him in therapy for consistent emotional support. Pt reflected on sense of helplessness regarding communication struggles in their relationship, generalizing this to the rest of  Owen's childhood and how much anxiety, worry, and frustration it will continue to cause. Pt went into further family dynamics this session and self-identity difficulties, sense of belonging and feeling accepted/understood by particular family members. Pt expressed how she is helping herself relax, allocate her responsibilities and cope with compounded things out of her control where she can, her partner supporting her where he can. Writer and pt had longer session due to diagnostic clarification of OCPD sx. Pt was able to express example of more optimistic thinking displayed at work she commended herself for. Writer and pt worked on cognitive restructuring, practicing reframing the negative thought through acceptance and acknowledging where the appreciation in the topic can be seen to help feel senses of control and gratitude.      Treatment Objective(s) Addressed in This Session:   use cognitive strategies identified in therapy to challenge anxious thoughts  Increase interest, engagement, and pleasure in doing things  Decrease frequency and intensity of feeling down, depressed, hopeless  Improve quantity and quality of night time sleep / decrease daytime naps  Feel less tired and more energy during the day   Identify negative self-talk and behaviors: challenge core beliefs, myths, and actions  Improve concentration, focus, and mindfulness in daily activities   Feel less fidgety, restless or slow in daily activities / interpersonal interactions  Trauma sx mgmt     Intervention:   CBT: reframe negative thinking; challenge anxious thoughts  Interpersonal Therapy: healthy boundary setting, assertive communication    Motivational Interviewing    MI Intervention: Expressed Empathy/Understanding, Supported Autonomy, Collaboration, Evocation, Permission to raise concern or advise, Open-ended questions, and Reflections: simple and complex     Change Talk Expressed by the Patient: Desire to change Ability to change Reasons  to change Need to change Committment to change    Provider Response to Change Talk: E - Evoked more info from patient about behavior change, A - Affirmed patient's thoughts, decisions, or attempts at behavior change, R - Reflected patient's change talk, and S - Summarized patient's change talk statements    Assessments completed prior to visit:    PHQ2:       12/23/2024     8:03 AM 12/8/2024     1:23 PM 7/18/2024     2:33 PM 4/18/2024     2:25 PM 1/3/2024     2:11 PM 11/29/2023     2:55 PM 11/1/2023     2:04 PM   PHQ-2 ( 1999 [employer])   Q1: Little interest or pleasure in doing things 0 0 0 0 0 0 0   Q2: Feeling down, depressed or hopeless 1 0 0 0 0 0 0   PHQ-2 Score 1  0  0 0 0 0 0   Q1: Little interest or pleasure in doing things Not at all Not at all Not at all Not at all Not at all Not at all Not at all   Q2: Feeling down, depressed or hopeless Several days Not at all Not at all Not at all Not at all Not at all Not at all   PHQ-2 Score 1 0 0 0 0 0 0       Patient-reported      GAD2:       8/20/2024     2:36 PM 10/8/2024     2:59 PM 11/3/2024     5:27 PM 12/4/2024    10:39 AM 12/23/2024     8:04 AM 1/22/2025     9:16 AM 2/9/2025     9:38 AM   AMELIA-2   Feeling nervous, anxious, or on edge 2 2 1 1 2 3 1   Not being able to stop or control worrying 2 2 2 1 2 3 1   AMELIA-2 Total Score 4 4 3  2  4  6  2        Patient-reported      ASSESSMENT: Current Emotional / Mental Status (status of significant symptoms):   Risk status (Self / Other harm or suicidal ideation)  Patient denies current homicidal ideation and behaviors.  Patient denies current self-injurious ideation and behaviors.    Patient denied risk behaviors associated with substance use.  Patient denies any high risk behaviors associated with mental health symptoms.  Patient reports the following current concerns for their personal safety: None.  Patient reports there are firearms in the house.     yes, they are secured. The firearms are secured in a locked  space.   Recommended that patient call 911 or go to the local ED should there be a change in any of these risk factors    Current Mental Status Exam:   Appearance:                Appropriate    Eye Contact:               Good   Psychomotor:              Normal       Gait / station:           seated  Attitude / Demeanor:   Cooperative  Pleasant  Speech      Rate / Production:   Normal/ Responsive      Volume:                   Normal  volume      Language:               intact  Mood:                          Anxious   Affect:                          Tearful   Thought Content:        Clear   Thought Process:        Coherent       Associations:           No loosening of associations  Insight:                         Fair   Judgment:                   Intact   Orientation:                 All  Attention/concentration:          Fair     Medication Review:   No changes to current psychiatric medication(s)    Current Outpatient Medications   Medication Sig Dispense Refill    doxycycline hyclate (VIBRAMYCIN) 100 MG capsule Take 1 capsule (100 mg) by mouth 2 times daily 28 capsule 0    lactobacillus rhamnosus, GG, (CULTURELL) capsule Take 1 capsule by mouth 2 times daily      Prenatal Vit-Fe Fumarate-FA (PRENATAL MULTIVITAMIN  PLUS IRON) 27-1 MG TABS Take 1 tablet by mouth daily       Current Facility-Administered Medications   Medication Dose Route Frequency Provider Last Rate Last Admin    triamcinolone acetonide (KENALOG-10) injection 10 mg  10 mg INTRA-ARTICULAR Once Mason Marte DPM             Medication Compliance:   NA     Changes in Health Issues:   None reported     Chemical Use Review:   Substance Use: Chemical use reviewed, no active concerns identified      Tobacco Use: No current tobacco use.      Diagnosis:  1. Generalized anxiety disorder    2. Trauma and stressor-related disorder      Collateral Reports Completed:   Not Applicable    PLAN: (Patient Tasks / Therapist Tasks / Other)    Pt was  encouraged to continue utilizing healthy boundary setting and assertive communication, considerations for decreasing compounded stressors that is within her control. Writer also encouraged pt to practice self-care, self-josé and self-acceptance, leaning on chosen healthy supports in her life and remind self what she has control over, accept what can't be controlled. Writer encourged pt to continue looking into possible OCPD sx to process further next session.    Rocío Potts, [facility] HospitalSW  2/10/25                                                       ______________________________________________________________________    Individual Treatment Plan    Patient's Name: Albertina Rojas  YOB: 1985    Date of Creation: 12/9/24  Date Treatment Plan Last Reviewed/Revised: NA    DSM5 Diagnoses:   300.02 (F41.1) Generalized Anxiety Disorder with depressive sx and panic attacks  309.9 (F43.9) Unspecified Trauma and Stressor Related Disorder     Psychosocial / Contextual Factors:   Trauma history, relational stressors    PROMIS (reviewed every 90 days): 2/9/25    PROMIS 10-Global Health (only subscores and total score):       11/1/2023     2:05 PM 11/29/2023     2:56 PM 3/7/2024     2:26 PM 3/21/2024     9:23 AM 7/13/2024     9:40 PM 11/3/2024     5:27 PM 2/9/2025     9:39 AM   PROMIS-10 Scores Only   Global Mental Health Score 12 13 12 12 13 12  11    Global Physical Health Score 15 16 16 16 14 16  16    PROMIS TOTAL - SUBSCORES 27 29 28 28 27 28  27        Patient-reported        Referral / Collaboration:  Referral to another professional/service is not indicated at this time..    Anticipated number of session for this episode of care: 6-9 sessions  Anticipation frequency of session: Every other week  Anticipated Duration of each session: 38-52 minutes  Treatment plan will be reviewed in 90 days or when goals have been changed.       MeasurableTreatment Goal(s) related to diagnosis / functional  impairment(s)    Goal:  Patient will reduce symptoms and impacts of anxiety - generalized anxiety; effectively reduce anxiety symptoms as evidenced by a reduced GAD7 score of 5 or less with the occurrence of several days or less.    Objective #A:  will experience a reduction in anxiety, will develop  more effective coping skills to manage anxiety symptoms, will develop healthy cognitive patterns and beliefs and will increase ability to function adaptively              Client will use cognitive strategies identified in therapy to challenge anxious thoughts.  Status: New - Date: 12/9/24      Objective #B:  will experience a reduction in anxiety, will develop more effective coping skills to manage anxiety symptoms, will develop healthy cognitive patterns and beliefs and will increase ability to function adaptively  Client will use relaxation strategies many times per day to reduce the physical symptoms of anxiety.  Status: New - Date: 12/9/24     Goal: Patient will experience a reduction in trauma sx; pt will identify and utilize healthy strategies to better manage trauma sx.    Objective #A (Patient Action)    Patient will identify 3 healthy coping strategies to manage panic sx.  Status: New - Date: 12/9/24     Objective #B  Patient will identify 3 sleep hygiene practices.  Status: New - Date: 12/9/24     Objective #C  Patient will identify 3 strategies to more effectively address stressors.  Status: New - Date: 12/9/24     Intervention(s)  Psycho-education regarding mental health diagnoses and treatment options    Skills training  Explore skills useful to client in current situation  Skills include assertiveness, communication, conflict management, problem-solving, relaxation, etc.    Solution-Focused Therapy  Explore patterns in patient's relationships and discussed options for new behaviors  Explore patterns in patient's actions and choices and discussed options for new behaviors    Cognitive-behavioral  Therapy  Discuss common cognitive distortions, identified them in patient's life  Explore ways to challenge, replace, and act against these cognitions    Acceptance and Commitment Therapy  Explore and identified important values in patient's life  Discuss ways to commit to behavioral activation around these values    Psychodynamic psychotherapy  Discuss patient's emotional dynamics and issues and how they impact behaviors  Explore patient's history of relationships and how they impact present behaviors  Explore how to work with and make changes in these schemas and patterns    Behavioral Activation  Discuss steps patient can take to become more involved in meaningful activity  Identify barriers to these activities and explored possible solutions    Mindfulness-Based Strategies  Discuss skills based on development and application of mindfulness  Skills drawn from dialectical behavior therapy, mindfulness-based stress reduction, mindfulness-based cognitive therapy, etc.       Rocío Potts, [facility] Care[facility]  December 9, 2024

## 2025-02-12 ENCOUNTER — LAB REQUISITION (OUTPATIENT)
Dept: LAB | Facility: CLINIC | Age: 40
End: 2025-02-12
Payer: COMMERCIAL

## 2025-02-12 DIAGNOSIS — Z36.89 ENCOUNTER FOR OTHER SPECIFIED ANTENATAL SCREENING: ICD-10-CM

## 2025-02-12 PROCEDURE — 86780 TREPONEMA PALLIDUM: CPT | Performed by: ADVANCED PRACTICE MIDWIFE

## 2025-02-12 PROCEDURE — 86780 TREPONEMA PALLIDUM: CPT | Mod: ORL | Performed by: ADVANCED PRACTICE MIDWIFE

## 2025-02-13 LAB — T PALLIDUM AB SER QL: NONREACTIVE

## 2025-02-24 ENCOUNTER — OFFICE VISIT (OUTPATIENT)
Dept: PSYCHOLOGY | Facility: CLINIC | Age: 40
End: 2025-02-24
Payer: COMMERCIAL

## 2025-02-24 DIAGNOSIS — F43.9 TRAUMA AND STRESSOR-RELATED DISORDER: ICD-10-CM

## 2025-02-24 DIAGNOSIS — F41.1 GENERALIZED ANXIETY DISORDER: Primary | ICD-10-CM

## 2025-02-24 PROCEDURE — 90837 PSYTX W PT 60 MINUTES: CPT

## 2025-02-24 NOTE — PROGRESS NOTES
[facility]                                     Progress Note    Patient Name: Albertina Rojas  Date: 2/24/25         Service Type: Individual      Session Start Time: 9:30A  Session End Time: 10:25A     Session Length: 55 minutes    Session #: 6    Attendees: Client attended alone    Service Modality:  In-person    DATA  Interactive Complexity: No  Crisis: No  Extended Session (60+ minutes): PROLONGED SERVICE IN THE OUTPATIENT SETTING REQUIRING DIRECT (FACE-TO-FACE) PATIENT CONTACT BEYOND THE USUAL SERVICE:    - Patient's presenting concerns require more intensive intervention than could be completed within the usual service    - High distress and under complex circumstances.  See Data section for details          Progress Since Last Session (Related to Symptoms / Goals / Homework):   Symptoms: No change maintains hyperverbalizations and hyperactivity; difficulties sleeping and compounded stressors, difficulty to focus and complete tasks    Homework: Completed in session      Episode of Care Goals: No improvement - PREPARATION (Decided to change - considering how); Intervened by negotiating a change plan and determining options / strategies for behavior change, identifying triggers, exploring social supports, and working towards setting a date to begin behavior change     Current / Ongoing Stressors and Concerns:    Pt reported continued and increased strain with the co-parenting dynamics with her ex and Owen. Pt said her ex is trying to return to court to change the custody arrangement and pt reflected on these increased worries and stressors before the year of last arrangement was made. Pt reflected on sense of helplessness regarding communication struggles in their relationship, generalizing this to the rest of Owen's childhood and how much anxiety, worry, and frustration it will continue to cause. Pt states added frustrations include work requiring remote workers to be in person, and  this impacts her Fridays with caring for Owen. Extended session was processing these feelings and weighing pros and cons with options, resources, and feasibility as she anticipates her baby coming soon as well. Pt expressed how she is helping herself relax, allocate her responsibilities and cope with compounded things out of her control where she can, her partner supporting her where he can.  Writer and pt worked on cognitive restructuring, practicing reframing the negative thought through acceptance and acknowledging where the appreciation in the topic can be seen to help feel senses of control and gratitude.      Treatment Objective(s) Addressed in This Session:   use cognitive strategies identified in therapy to challenge anxious thoughts  Increase interest, engagement, and pleasure in doing things  Decrease frequency and intensity of feeling down, depressed, hopeless  Improve quantity and quality of night time sleep / decrease daytime naps  Feel less tired and more energy during the day   Identify negative self-talk and behaviors: challenge core beliefs, myths, and actions  Improve concentration, focus, and mindfulness in daily activities   Feel less fidgety, restless or slow in daily activities / interpersonal interactions  Trauma sx mgmt     Intervention:   CBT: reframe negative thinking; challenge anxious thoughts  Interpersonal Therapy: healthy boundary setting, assertive communication    Motivational Interviewing    MI Intervention: Expressed Empathy/Understanding, Supported Autonomy, Collaboration, Evocation, Permission to raise concern or advise, Open-ended questions, and Reflections: simple and complex     Change Talk Expressed by the Patient: Desire to change Ability to change Reasons to change Need to change Committment to change    Provider Response to Change Talk: E - Evoked more info from patient about behavior change, A - Affirmed patient's thoughts, decisions, or attempts at behavior change, R -  Reflected patient's change talk, and S - Summarized patient's change talk statements    Assessments completed prior to visit:    PHQ2:       12/23/2024     8:03 AM 12/8/2024     1:23 PM 7/18/2024     2:33 PM 4/18/2024     2:25 PM 1/3/2024     2:11 PM 11/29/2023     2:55 PM 11/1/2023     2:04 PM   PHQ-2 ( 1999 [employer])   Q1: Little interest or pleasure in doing things 0 0 0 0 0 0 0   Q2: Feeling down, depressed or hopeless 1 0 0 0 0 0 0   PHQ-2 Score 1  0  0 0 0 0 0   Q1: Little interest or pleasure in doing things Not at all Not at all Not at all Not at all Not at all Not at all Not at all   Q2: Feeling down, depressed or hopeless Several days Not at all Not at all Not at all Not at all Not at all Not at all   PHQ-2 Score 1 0 0 0 0 0 0       Patient-reported      GAD2:       10/8/2024     2:59 PM 11/3/2024     5:27 PM 12/4/2024    10:39 AM 12/23/2024     8:04 AM 1/22/2025     9:16 AM 2/9/2025     9:38 AM 2/19/2025    11:11 AM   AMELIA-2   Feeling nervous, anxious, or on edge 2 1 1 2 3 1 1   Not being able to stop or control worrying 2 2 1 2 3 1 1   AMELIA-2 Total Score 4 3  2  4  6  2  2        Patient-reported      ASSESSMENT: Current Emotional / Mental Status (status of significant symptoms):   Risk status (Self / Other harm or suicidal ideation)  Patient denies current homicidal ideation and behaviors.  Patient denies current self-injurious ideation and behaviors.    Patient denied risk behaviors associated with substance use.  Patient denies any high risk behaviors associated with mental health symptoms.  Patient reports the following current concerns for their personal safety: None.  Patient reports there are firearms in the house.     yes, they are secured. The firearms are secured in a locked space.   Recommended that patient call 911 or go to the local ED should there be a change in any of these risk factors    Current Mental Status Exam:   Appearance:                Appropriate    Eye Contact:               Good    Psychomotor:              Normal       Gait / station:           seated  Attitude / Demeanor:   Cooperative  Pleasant  Speech      Rate / Production:   Normal/ Responsive      Volume:                   Normal  volume      Language:               intact  Mood:                          Anxious   Affect:                          Tearful   Thought Content:        Clear   Thought Process:        Coherent       Associations:           No loosening of associations  Insight:                         Fair   Judgment:                   Intact   Orientation:                 All  Attention/concentration:          Fair     Medication Review:   No changes to current psychiatric medication(s)    Current Outpatient Medications   Medication Sig Dispense Refill    doxycycline hyclate (VIBRAMYCIN) 100 MG capsule Take 1 capsule (100 mg) by mouth 2 times daily 28 capsule 0    lactobacillus rhamnosus, GG, (CULTURELL) capsule Take 1 capsule by mouth 2 times daily      Prenatal Vit-Fe Fumarate-FA (PRENATAL MULTIVITAMIN  PLUS IRON) 27-1 MG TABS Take 1 tablet by mouth daily       Current Facility-Administered Medications   Medication Dose Route Frequency Provider Last Rate Last Admin    triamcinolone acetonide (KENALOG-10) injection 10 mg  10 mg INTRA-ARTICULAR Once Mason Marte DPM             Medication Compliance:   NA     Changes in Health Issues:   None reported     Chemical Use Review:   Substance Use: Chemical use reviewed, no active concerns identified      Tobacco Use: No current tobacco use.      Diagnosis:  1. Generalized anxiety disorder    2. Trauma and stressor-related disorder      Collateral Reports Completed:   Not Applicable    PLAN: (Patient Tasks / Therapist Tasks / Other)    Pt was encouraged to continue utilizing healthy boundary setting and assertive communication, considerations for decreasing compounded stressors that is within her control. Writer also encouraged pt to practice self-care, self-josé and  self-acceptance, leaning on chosen healthy supports in her life and remind self what she has control over, accept what can't be controlled. Writer encourged pt to continue looking into resources and strategies to improve co-parenting relationship structure of responsibilities with Owen's care and advocating for herself and accommodation at work where necessary to improve quality of life and co-parenting.    Rocío Potts, [facility]  2/24/25                                                       ______________________________________________________________________    Individual Treatment Plan    Patient's Name: Albertina Rojas  YOB: 1985    Date of Creation: 12/9/24  Date Treatment Plan Last Reviewed/Revised: NA    DSM5 Diagnoses:   300.02 (F41.1) Generalized Anxiety Disorder with depressive sx and panic attacks  309.9 (F43.9) Unspecified Trauma and Stressor Related Disorder     Psychosocial / Contextual Factors:   Trauma history, relational stressors    PROMIS (reviewed every 90 days): 2/9/25    PROMIS 10-Global Health (only subscores and total score):       11/29/2023     2:56 PM 3/7/2024     2:26 PM 3/21/2024     9:23 AM 7/13/2024     9:40 PM 11/3/2024     5:27 PM 2/9/2025     9:39 AM 2/19/2025    11:12 AM   PROMIS-10 Scores Only   Global Mental Health Score 13 12 12 13 12  11  14    Global Physical Health Score 16 16 16 14 16  16  13    PROMIS TOTAL - SUBSCORES 29 28 28 27 28  27  27        Patient-reported        Referral / Collaboration:  Referral to another professional/service is not indicated at this time..    Anticipated number of session for this episode of care: 6-9 sessions  Anticipation frequency of session: Every other week  Anticipated Duration of each session: 38-52 minutes  Treatment plan will be reviewed in 90 days or when goals have been changed.       MeasurableTreatment Goal(s) related to diagnosis / functional impairment(s)    Goal:  Patient will reduce symptoms and impacts of  anxiety - generalized anxiety; effectively reduce anxiety symptoms as evidenced by a reduced GAD7 score of 5 or less with the occurrence of several days or less.    Objective #A:  will experience a reduction in anxiety, will develop  more effective coping skills to manage anxiety symptoms, will develop healthy cognitive patterns and beliefs and will increase ability to function adaptively              Client will use cognitive strategies identified in therapy to challenge anxious thoughts.  Status: New - Date: 12/9/24      Objective #B:  will experience a reduction in anxiety, will develop more effective coping skills to manage anxiety symptoms, will develop healthy cognitive patterns and beliefs and will increase ability to function adaptively  Client will use relaxation strategies many times per day to reduce the physical symptoms of anxiety.  Status: New - Date: 12/9/24     Goal: Patient will experience a reduction in trauma sx; pt will identify and utilize healthy strategies to better manage trauma sx.    Objective #A (Patient Action)    Patient will identify 3 healthy coping strategies to manage panic sx.  Status: New - Date: 12/9/24     Objective #B  Patient will identify 3 sleep hygiene practices.  Status: New - Date: 12/9/24     Objective #C  Patient will identify 3 strategies to more effectively address stressors.  Status: New - Date: 12/9/24     Intervention(s)  Psycho-education regarding mental health diagnoses and treatment options    Skills training  Explore skills useful to client in current situation  Skills include assertiveness, communication, conflict management, problem-solving, relaxation, etc.    Solution-Focused Therapy  Explore patterns in patient's relationships and discussed options for new behaviors  Explore patterns in patient's actions and choices and discussed options for new behaviors    Cognitive-behavioral Therapy  Discuss common cognitive distortions, identified them in patient's  life  Explore ways to challenge, replace, and act against these cognitions    Acceptance and Commitment Therapy  Explore and identified important values in patient's life  Discuss ways to commit to behavioral activation around these values    Psychodynamic psychotherapy  Discuss patient's emotional dynamics and issues and how they impact behaviors  Explore patient's history of relationships and how they impact present behaviors  Explore how to work with and make changes in these schemas and patterns    Behavioral Activation  Discuss steps patient can take to become more involved in meaningful activity  Identify barriers to these activities and explored possible solutions    Mindfulness-Based Strategies  Discuss skills based on development and application of mindfulness  Skills drawn from dialectical behavior therapy, mindfulness-based stress reduction, mindfulness-based cognitive therapy, etc.       Rocío Potts, [facility]  December 9, 2024

## 2025-03-03 ENCOUNTER — TELEPHONE (OUTPATIENT)
Dept: OBGYN | Facility: CLINIC | Age: 40
End: 2025-03-03
Payer: COMMERCIAL

## 2025-03-03 NOTE — TELEPHONE ENCOUNTER
Called pt back, scheduled to see Dr. Alfaro for First Provider OB appointment BABAK this week on 3/06/2025.    Routing to RN inbasket to work into Intake Schedule prior to 3/06/2025.    Tiffani SPAIN RN -  OB/GYN group

## 2025-03-03 NOTE — TELEPHONE ENCOUNTER
Spoke with pt and I will work pt in for an OB intake on Wednesday, 3/5, at 10 am. However, I was not able to put her on the intake schedule as it is held due to LPN that typically does our intakes on Wednesday is on vacation.    I will need to have our clinic manager open this up so it will let me schedule but she is gone for the afternoon.  Will check with her tomorrow.  However, pt is aware that her intake is on Wednesday, 3/5, at 10 am.    Elisabeth Martines, RN- OB/GYN group

## 2025-03-03 NOTE — TELEPHONE ENCOUNTER
Health Call Center    Phone Message    May a detailed message be left on voicemail: yes     Reason for Call: Other: Patient requesting external BABAK from [facility] pt would like to transfer due to not wanting to deliver with [facility] group - LMP: 7/27/2024 DANIEL: 05/03/2025 GA: 31w 2d. Patient is scheduled to see current prenatal care group on 3/12/2025. Sending TE to clinic per protocol.     Action Taken: Message routed to:  [id number]    Travel Screening: Not Applicable     Date of Service:

## 2025-03-04 NOTE — TELEPHONE ENCOUNTER
Pt is now scheduled for an OB intake tomorrow, 3/5, at 10 am.    Elisabeth Martines RN- OB/GYN group

## 2025-03-05 ENCOUNTER — VIRTUAL VISIT (OUTPATIENT)
Dept: OBGYN | Facility: CLINIC | Age: 40
End: 2025-03-05
Payer: COMMERCIAL

## 2025-03-05 DIAGNOSIS — Z34.80 PRENATAL CARE, SUBSEQUENT PREGNANCY, UNSPECIFIED TRIMESTER: Primary | ICD-10-CM

## 2025-03-05 PROCEDURE — 99207 PR NO CHARGE NURSE ONLY: CPT | Mod: 93

## 2025-03-05 RX ORDER — ASPIRIN 81 MG/1
81 TABLET, CHEWABLE ORAL DAILY
COMMUNITY

## 2025-03-05 NOTE — PROGRESS NOTES
Telephone visit with patient for New Prenatal Intake and Education. Pt is a BABAK from [facility].  Her last OV with [facility] was on 2/26.    This is patient's 4th pregnancy. Scheduled for New Prenatal with Dr. Alfaro on 3/6/25.       Prenatal OB Questionnaire  Patient supplied answers from flow sheet for:  Prenatal OB Questionnaire.  Past Medical History  Have you ever recieved care for your mental health? : (!) (Patient-Rptd) Yes  Have you ever been in a major accident or suffered serious trauma?: (Patient-Rptd) No  Within the last year, has anyone hit, slapped, kicked or otherwise hurt you?: (Patient-Rptd) No  In the last year, has anyone forced you to have sex when you didn't want to?: (Patient-Rptd) No    Past Medical History 2   Have you ever received a blood transfusion?: (Patient-Rptd) No  Would you accept a blood transfusion if was medically recommended?: (Patient-Rptd) Yes  Does anyone in your home smoke?: (Patient-Rptd) No   Is your blood type Rh negative?: (Patient-Rptd) No  Have you ever ?: (!) (Patient-Rptd) Yes  Have you been hospitalized for a nonsurgical reason excluding normal delivery?: (Patient-Rptd) No  Have you ever had an abnormal pap smear?: (!) (Patient-Rptd) Yes    Past Medical History (Continued)  Do you have a history of abnormalities of the uterus?: (Patient-Rptd) No  Did your mother take ELLIOTT or any other hormones when she was pregnant with you?: (Patient-Rptd) No  Do you have any other problems we have not asked about which you feel may be important to this pregnancy?: (Patient-Rptd) No        Allergies as of 3/5/2025:    Allergies as of 03/05/2025 - Reviewed 08/08/2024   Allergen Reaction Noted    Seasonal allergies Other (See Comments) 04/19/2021       Current medications are:  Current Outpatient Medications   Medication Sig Dispense Refill    doxycycline hyclate (VIBRAMYCIN) 100 MG capsule Take 1 capsule (100 mg) by mouth 2 times daily 28 capsule 0    lactobacillus  rhamnosus, GG, (CULTURELL) capsule Take 1 capsule by mouth 2 times daily      Prenatal Vit-Fe Fumarate-FA (PRENATAL MULTIVITAMIN  PLUS IRON) 27-1 MG TABS Take 1 tablet by mouth daily           Elisabeth Martines RN-[facility]

## 2025-03-05 NOTE — PATIENT INSTRUCTIONS
"Weeks 30 to 32 of Your Pregnancy: Care Instructions  Your baby is growing more every day. Its eyes can open and close, and it may have hair on its head. Your baby may sleep 20 to 45 minutes at a time and is more active at certain times.    You should feel your baby move several times every day. Your baby now turns less and kicks more.   This is a good time to tour your hospital or birthing center. You may also want to find childcare if needed.         To ease heartburn   Avoid foods that make your symptoms worse, such as chocolate, spicy foods, and caffeine.  Avoid bending over or lying down after meals.  Do not eat for 2 hours before bedtime.  Take antacids like Tums, but don't take ones that have sodium bicarbonate, magnesium trisilicate, or aspirin.        To care for large, swollen veins (varicose veins)   Try to avoid standing for long periods of time.  Sit with your feet propped up.  Wear support hose.  Get some exercise every day, like walking or swimming.  Counting your baby's kicks  Your doctor may ask you to count your baby's movements, such as kicks, flutters, or rolls.    Find a quiet place, and get comfortable. Write down your start time. Count your baby's movements (except hiccups). When your baby has moved 10 times, you can stop counting. Write down how many minutes it took.   If an hour goes by and you don't feel 10 movements, have something to eat or drink. Count for another hour. If you don't feel at least 10 movements in the 2-hour period, call your doctor.   Follow-up care is a key part of your treatment and safety. Be sure to make and go to all appointments, and call your doctor if you are having problems. It's also a good idea to know your test results and keep a list of the medicines you take.  Where can you learn more?  Go to https://www.[employer].wise.net/patiented  Enter X471 in the search box to learn more about \"Weeks 30 to 32 of Your Pregnancy: Care Instructions.\"  Current as of: April 30, " "2024  Content Version: 14.3    2024 [employer].   Care instructions adapted under license by your healthcare professional. If you have questions about a medical condition or this instruction, always ask your healthcare professional. [employer] disclaims any warranty or liability for your use of this information.    Learning About Birth Control After Childbirth  Birth control is any method used to prevent pregnancy. If you have vaginal sex without birth control, you could get pregnant--even if you haven't started having periods again. You're less likely to get pregnant while breastfeeding, but it's still possible. Finding birth control that works for you can help avoid an unplanned pregnancy.  There are many kinds of birth control. Each has pros and cons. Find what works for you. Talk to your doctor if you've just given birth or are breastfeeding.    Long-acting reversible contraception (LARC). These are placed inside your body by a doctor. They can prevent pregnancy for years.  Examples include:  An implant (hormonal).  Copper intrauterine device (IUD).  Hormonal IUDs.    Short-acting hormonal methods. These release hormones. Examples include:  Combination birth control pills (\"the pill\").  Skin patches.  A vaginal ring.  A shot.  Mini-pills. Choose progestin-only options soon after giving birth.    Barrier methods. Use these every time you have vaginal sex.  Examples include:  External (male) condoms.  Internal (female) condoms.  Diaphragms.  Cervical caps.  Sponges.    Spermicides. These kill sperm or stop sperm from moving. They can be gels, creams, foams, films, or tablets. Use them before vaginal sex.  Examples include:  Nonoxynol-9.  pH regulator gel.    Permanent birth control (sterilization). This can be an option if you're sure that you don't want to get pregnant later.  Examples include:  Vasectomy.  Having tubes tied (tubal ligation).    Emergency contraception. This is a backup " "method. Use it if you didn't use birth control or your birth control method failed.  Examples include:  Copper and hormonal IUDs.  Emergency contraceptive pills.    Fertility awareness. You'll learn when you are most likely to become pregnant (are fertile). You can avoid vaginal sex at that time.  It's also called:  Natural family planning.  The rhythm method.    Breastfeeding. This is most effective when all of these are true:  Your baby is younger than 6 months old.  You're breastfeeding and not bottle-feeding at all.  You aren't having periods.  Follow-up care is a key part of your treatment and safety. Be sure to make and go to all appointments, and call your doctor if you are having problems. It's also a good idea to know your test results and keep a list of the medicines you take.  Where can you learn more?  Go to https://www.[employer].net/patiented  Enter X408 in the search box to learn more about \"Learning About Birth Control After Childbirth.\"  Current as of: April 30, 2024  Content Version: 14.3    2024 [employer].   Care instructions adapted under license by your healthcare professional. If you have questions about a medical condition or this instruction, always ask your healthcare professional. [employer] disclaims any warranty or liability for your use of this information.                                                         If you have any questions regarding your visit, Please contact your care team.     [employer] [facility]: [phone]    To Schedule an Appointment 24/7  Call: [address] [place] [address] HOURS TELEPHONE NUMBER     Yony Alfaro MD  Medical Director        Hemanth Barber-ELÍAS Rincon-Surgery Scheduler  Luz Elena-Surgery Scheduler               Tuesday-[place]  7:30 a.m-4:30 p.m    Thursday-[place]  7:30 a.m-4:30 p.m    Typical Surgery Days: Tuesday or Friday [facility]  [address]   [place] " [address]  PH: [phone]    Imaging Scheduling all locations  PH: [phone]     [facility]  [address] [place] [address]  PH: [phone]    [facility]  [address] [place] [address]  PH: [phone] Fax      **Surgeries** Our Surgery Schedulers will contact you to schedule. If you do not receive a call within 3 business days, please call [phone].    Urgent Care locations:  [facility] Monday-Friday  10 am - 8 pm  Saturday and Sunday   9 am - 5 pm  Monday-Friday   10 am - 8 pm  Saturday and Sunday   9 am - 5 pm   [phone]   If you need a medication refill, please contact your pharmacy. Please allow 3 business days for your refill to be completed.  As always, Thank you for trusting us with your healthcare needs!    see additional instructions from your care team below

## 2025-03-06 ENCOUNTER — TELEPHONE (OUTPATIENT)
Dept: OBGYN | Facility: CLINIC | Age: 40
End: 2025-03-06

## 2025-03-06 ENCOUNTER — MYC MEDICAL ADVICE (OUTPATIENT)
Dept: OBGYN | Facility: CLINIC | Age: 40
End: 2025-03-06

## 2025-03-06 ENCOUNTER — PRENATAL OFFICE VISIT (OUTPATIENT)
Dept: OBGYN | Facility: CLINIC | Age: 40
End: 2025-03-06
Payer: COMMERCIAL

## 2025-03-06 VITALS
HEART RATE: 95 BPM | BODY MASS INDEX: 38.71 KG/M2 | OXYGEN SATURATION: 98 % | SYSTOLIC BLOOD PRESSURE: 108 MMHG | WEIGHT: 236.2 LBS | DIASTOLIC BLOOD PRESSURE: 64 MMHG

## 2025-03-06 DIAGNOSIS — O09.523 MULTIGRAVIDA OF ADVANCED MATERNAL AGE IN THIRD TRIMESTER: Primary | ICD-10-CM

## 2025-03-06 NOTE — TELEPHONE ENCOUNTER
Sent pt a [employer] message. I did attempt to reach her by phone/computer was cutting out when I was talking to her.    Elisabeth Martines RN-[facility]

## 2025-03-06 NOTE — TELEPHONE ENCOUNTER
Closing encounter as pt has responded to the "[employer]" message sent to her.  See 3/6 "[employer] encounter for further communication.    Elisabeth Martines RN-[facility]

## 2025-03-06 NOTE — TELEPHONE ENCOUNTER
[facility]    Phone Message    May a detailed message be left on voicemail: yes     Reason for Call: Other: Pt wants to know if it is okay to be seen 4 days later, 4/24 and then 4/28 or if she can be squeezed in at all for a later date that week. Pt does not want to travel to another clinic. Please advise.      Action Taken: Message routed to:  Other: [facility]    Travel Screening: Not Applicable     Date of Service:

## 2025-03-06 NOTE — PROGRESS NOTES
Patient presents for routine prenatal visit at 31w5d.  Patient without complaint. Reviewed our group , call, hospital coverage, etc.  Discussed IOL 39-40 weeks due to ADVANCED MATERNAL AGE.   PE: See OB vitals  Body mass index is 38.71 kg/m .    Doing well.  No concerns today.  No vaginal bleeding, loss of fluid, or contractions  Questions asked and answered.      Yony Alfaro MD FACOG

## 2025-03-10 ENCOUNTER — OFFICE VISIT (OUTPATIENT)
Dept: PSYCHOLOGY | Facility: CLINIC | Age: 40
End: 2025-03-10
Payer: COMMERCIAL

## 2025-03-10 DIAGNOSIS — F43.9 TRAUMA AND STRESSOR-RELATED DISORDER: ICD-10-CM

## 2025-03-10 DIAGNOSIS — F41.1 GENERALIZED ANXIETY DISORDER: Primary | ICD-10-CM

## 2025-03-10 PROCEDURE — G2211 COMPLEX E/M VISIT ADD ON: HCPCS

## 2025-03-10 PROCEDURE — 99215 OFFICE O/P EST HI 40 MIN: CPT

## 2025-03-10 PROCEDURE — 90785 PSYTX COMPLEX INTERACTIVE: CPT

## 2025-03-10 NOTE — PROGRESS NOTES
[facility]                                     Progress Note    Patient Name: Albertina Rojas  Date: 3/10/25         Service Type: Individual      Session Start Time: 836A  Session End Time: 9:30A     Session Length: 54 minutes    Session #: 7    Attendees: Client attended alone    Service Modality:  In-person    DATA  Interactive Complexity: -The need to manage maladaptive communication (related to, e.g., high anxiety, high reactivity, repeated questions, or disagreement) among participants that complicates delivery of care  Crisis: No  Extended Session (60+ minutes): PROLONGED SERVICE IN THE OUTPATIENT SETTING REQUIRING DIRECT (FACE-TO-FACE) PATIENT CONTACT BEYOND THE USUAL SERVICE:    - Patient's presenting concerns require more intensive intervention than could be completed within the usual service    - High distress and under complex circumstances.  See Data section for details          Progress Since Last Session (Related to Symptoms / Goals / Homework):   Symptoms: No change maintains hyperverbalizations and hyperactivity; difficulties sleeping and compounded stressors, difficulty to focus and complete tasks    Homework: Completed in session      Episode of Care Goals: No improvement - PREPARATION (Decided to change - considering how); Intervened by negotiating a change plan and determining options / strategies for behavior change, identifying triggers, exploring social supports, and working towards setting a date to begin behavior change     Current / Ongoing Stressors and Concerns:    Pt reported continued and increased strain with the co-parenting dynamics with her ex and Owen. Pt said her ex is trying to return to court to change the custody arrangement and pt reflected on these increased worries and stressors before the year of last arrangement was made. Pt reflected on sense of helplessness regarding communication struggles in their relationship, generalizing this to the rest of  Owen's childhood and how much anxiety, worry, and frustration it will continue to cause. Pt spent part of session reflecting on dynamics and difficulties with key coworkers that aren't performing well and negatively impacting her and the team. Writer and pt worked on cognitive restructuring, practicing reframing the negative thought through acceptance and acknowledging where the appreciation in the topic can be seen to help feel senses of control and gratitude. Extended session and interactive complexity due to emotional reactivity, frequently tearfulness and relaxation strategies utilized within session to re-regulate.     Treatment Objective(s) Addressed in This Session:   use cognitive strategies identified in therapy to challenge anxious thoughts  Increase interest, engagement, and pleasure in doing things  Decrease frequency and intensity of feeling down, depressed, hopeless  Improve quantity and quality of night time sleep / decrease daytime naps  Feel less tired and more energy during the day   Identify negative self-talk and behaviors: challenge core beliefs, myths, and actions  Improve concentration, focus, and mindfulness in daily activities   Feel less fidgety, restless or slow in daily activities / interpersonal interactions  Trauma sx mgmt     Intervention:   CBT: reframe negative thinking; challenge anxious thoughts  Interpersonal Therapy: healthy boundary setting, assertive communication    Motivational Interviewing    MI Intervention: Expressed Empathy/Understanding, Supported Autonomy, Collaboration, Evocation, Permission to raise concern or advise, Open-ended questions, and Reflections: simple and complex     Change Talk Expressed by the Patient: Desire to change Ability to change Reasons to change Need to change Committment to change    Provider Response to Change Talk: E - Evoked more info from patient about behavior change, A - Affirmed patient's thoughts, decisions, or attempts at behavior  change, R - Reflected patient's change talk, and S - Summarized patient's change talk statements    Assessments completed prior to visit:    PHQ2:       3/6/2025    10:38 AM 3/4/2025     4:57 PM 12/23/2024     8:03 AM 12/8/2024     1:23 PM 7/18/2024     2:33 PM 4/18/2024     2:25 PM 1/3/2024     2:11 PM   PHQ-2 ( 1999 [employer])   Q1: Little interest or pleasure in doing things 0 0 0 0 0 0 0   Q2: Feeling down, depressed or hopeless 0 0 1 0 0 0 0   PHQ-2 Score 0  0  1  0  0 0 0   Q1: Little interest or pleasure in doing things Not at all Not at all Not at all Not at all Not at all Not at all Not at all   Q2: Feeling down, depressed or hopeless Not at all Not at all Several days Not at all Not at all Not at all Not at all   PHQ-2 Score 0 0 1 0 0 0 0       Patient-reported      GAD2:       11/3/2024     5:27 PM 12/4/2024    10:39 AM 12/23/2024     8:04 AM 1/22/2025     9:16 AM 2/9/2025     9:38 AM 2/19/2025    11:11 AM 3/10/2025     8:08 AM   AMELIA-2   Feeling nervous, anxious, or on edge 1 1 2 3 1 1 1   Not being able to stop or control worrying 2 1 2 3 1 1 1   AMELIA-2 Total Score 3  2  4  6  2  2  2        Patient-reported      ASSESSMENT: Current Emotional / Mental Status (status of significant symptoms):   Risk status (Self / Other harm or suicidal ideation)  Patient denies current homicidal ideation and behaviors.  Patient denies current self-injurious ideation and behaviors.    Patient denied risk behaviors associated with substance use.  Patient denies any high risk behaviors associated with mental health symptoms.  Patient reports the following current concerns for their personal safety: None.  Patient reports there are firearms in the house.     yes, they are secured. The firearms are secured in a locked space.   Recommended that patient call 911 or go to the local ED should there be a change in any of these risk factors    Current Mental Status Exam:   Appearance:                Appropriate    Eye Contact:                Good   Psychomotor:              Normal       Gait / station:           seated  Attitude / Demeanor:   Cooperative  Pleasant  Speech      Rate / Production:   Normal/ Responsive      Volume:                   Normal  volume      Language:               intact  Mood:                          Anxious   Affect:                          Tearful   Thought Content:        Clear   Thought Process:        Coherent       Associations:           No loosening of associations  Insight:                         Fair   Judgment:                   Intact   Orientation:                 All  Attention/concentration:          Fair     Medication Review:   No changes to current psychiatric medication(s)    Current Outpatient Medications   Medication Sig Dispense Refill    aspirin (ASA) 81 MG chewable tablet Take 81 mg by mouth daily.      calcium carbonate 750 MG CHEW Take 750 mg by mouth daily.      Cyanocobalamin (VITAMIN B-12) 5000 MCG SUBL       lactobacillus rhamnosus, GG, (CULTURELL) capsule Take 1 capsule by mouth 2 times daily      Omeprazole Magnesium (PRILOSEC PO) 20 mg every morning.      Prenatal Vit-Fe Fumarate-FA (PRENATAL MULTIVITAMIN  PLUS IRON) 27-1 MG TABS Take 1 tablet by mouth daily       No current facility-administered medications for this visit.         Medication Compliance:   NA     Changes in Health Issues:   None reported     Chemical Use Review:   Substance Use: Chemical use reviewed, no active concerns identified      Tobacco Use: No current tobacco use.      Diagnosis:  1. Generalized anxiety disorder    2. Trauma and stressor-related disorder      Collateral Reports Completed:   Not Applicable    PLAN: (Patient Tasks / Therapist Tasks / Other)    Pt was encouraged to CONTINUE utilizing healthy boundary setting and assertive communication, considerations for decreasing compounded stressors that is within her control. Writer also encouraged pt to practice self-care, self-josé and self-acceptance,  leaning on chosen healthy supports in her life and remind self what she has control over, accept what can't be controlled. Writer encourged pt to continue looking into resources and strategies to improve co-parenting relationship structure of responsibilities with Owen's care and advocating for herself and accommodation at work where necessary to improve quality of life and co-parenting.    Rocío Potts, LICSW  3/10/25                                                       ______________________________________________________________________    Individual Treatment Plan    Patient's Name: Albertina Rojas  YOB: 1985    Date of Creation: 12/9/24  Date Treatment Plan Last Reviewed/Revised: 3/10/25    DSM5 Diagnoses:   300.02 (F41.1) Generalized Anxiety Disorder with depressive sx and panic attacks  309.9 (F43.9) Unspecified Trauma and Stressor Related Disorder     Psychosocial / Contextual Factors:   Trauma history, relational stressors    PROMIS (reviewed every 90 days): 3/10/25    PROMIS 10-Global Health (only subscores and total score):       3/7/2024     2:26 PM 3/21/2024     9:23 AM 7/13/2024     9:40 PM 11/3/2024     5:27 PM 2/9/2025     9:39 AM 2/19/2025    11:12 AM 3/10/2025     8:09 AM   PROMIS-10 Scores Only   Global Mental Health Score 12 12 13 12  11  14  12    Global Physical Health Score 16 16 14 16  16  13  15    PROMIS TOTAL - SUBSCORES 28 28 27 28  27  27  27        Patient-reported        Referral / Collaboration:  Referral to another professional/service is not indicated at this time..    Anticipated number of session for this episode of care: 6-9 sessions  Anticipation frequency of session: Every other week  Anticipated Duration of each session: 38-52 minutes  Treatment plan will be reviewed in 90 days or when goals have been changed.       MeasurableTreatment Goal(s) related to diagnosis / functional impairment(s)    Goal:  Patient will reduce symptoms and impacts of anxiety  - generalized anxiety; effectively reduce anxiety symptoms as evidenced by a reduced GAD7 score of 5 or less with the occurrence of several days or less.    Objective #A:  will experience a reduction in anxiety, will develop  more effective coping skills to manage anxiety symptoms, will develop healthy cognitive patterns and beliefs and will increase ability to function adaptively              Client will use cognitive strategies identified in therapy to challenge anxious thoughts.  Status: CONTINUED 3/10/25    Objective #B:  will experience a reduction in anxiety, will develop more effective coping skills to manage anxiety symptoms, will develop healthy cognitive patterns and beliefs and will increase ability to function adaptively  Client will use relaxation strategies many times per day to reduce the physical symptoms of anxiety.  Status: CONTINUED 3/10/25    Goal: Patient will experience a reduction in trauma sx; pt will identify and utilize healthy strategies to better manage trauma sx.    Objective #A (Patient Action)    Patient will identify 3 healthy coping strategies to manage panic sx.  Status: CONTINUED 3/10/25    Objective #B  Patient will identify 3 sleep hygiene practices.  Status: CONTINUED 3/10/25    Objective #C  Patient will identify 3 strategies to more effectively address stressors.  Status: CONTINUED 3/10/25    Intervention(s)  Psycho-education regarding mental health diagnoses and treatment options    Skills training  Explore skills useful to client in current situation  Skills include assertiveness, communication, conflict management, problem-solving, relaxation, etc.    Solution-Focused Therapy  Explore patterns in patient's relationships and discussed options for new behaviors  Explore patterns in patient's actions and choices and discussed options for new behaviors    Cognitive-behavioral Therapy  Discuss common cognitive distortions, identified them in patient's life  Explore ways to  challenge, replace, and act against these cognitions    Acceptance and Commitment Therapy  Explore and identified important values in patient's life  Discuss ways to commit to behavioral activation around these values    Psychodynamic psychotherapy  Discuss patient's emotional dynamics and issues and how they impact behaviors  Explore patient's history of relationships and how they impact present behaviors  Explore how to work with and make changes in these schemas and patterns    Behavioral Activation  Discuss steps patient can take to become more involved in meaningful activity  Identify barriers to these activities and explored possible solutions    Mindfulness-Based Strategies  Discuss skills based on development and application of mindfulness  Skills drawn from dialectical behavior therapy, mindfulness-based stress reduction, mindfulness-based cognitive therapy, etc.       Rocío Potts, [facility]  3/10/25

## 2025-03-24 ENCOUNTER — OFFICE VISIT (OUTPATIENT)
Dept: PSYCHOLOGY | Facility: CLINIC | Age: 40
End: 2025-03-24
Payer: COMMERCIAL

## 2025-03-24 DIAGNOSIS — F43.9 TRAUMA AND STRESSOR-RELATED DISORDER: ICD-10-CM

## 2025-03-24 DIAGNOSIS — F41.1 GENERALIZED ANXIETY DISORDER: Primary | ICD-10-CM

## 2025-03-24 PROCEDURE — 90834 PSYTX W PT 45 MINUTES: CPT

## 2025-03-24 NOTE — PROGRESS NOTES
[facility]                                     Progress Note    Patient Name: Albertina Rojas  Date: 3/24/25         Service Type: Individual      Session Start Time: 833A  Session End Time: 9:20A     Session Length: 47 minutes    Session #: 8    Attendees: Client attended alone    Service Modality:  In-person    DATA  Interactive Complexity: No  Crisis: No  Extended Session (60+ minutes): No          Progress Since Last Session (Related to Symptoms / Goals / Homework):   Symptoms: No change maintains hyperverbalizations and hyperactivity; difficulties sleeping and compounded stressors, difficulty to focus and complete tasks    Homework: Completed in session      Episode of Care Goals: No improvement - PREPARATION (Decided to change - considering how); Intervened by negotiating a change plan and determining options / strategies for behavior change, identifying triggers, exploring social supports, and working towards setting a date to begin behavior change     Current / Ongoing Stressors and Concerns:    Pt reflected on her increased physical discomfort with progressing pregnancy and limited abilities in completing tasks and household maintenance. This has led to prolonged expectation of partner doing more than he typically does, and cause at times strain in the relationship with work/tasks not being as equal. Pt described issues with work, how she is approaching it, considered additional strategies in navigating compounded stressors and anxiety. Pt was tearful during session regarding her difficulties accepting lack of control and how she tries to accept where she can and operate within her controllables. Writer and pt worked on cognitive restructuring, practicing reframing the negative thought through acceptance and acknowledging where the appreciation in the topic can be seen to help feel senses of control and gratitude.      Treatment Objective(s) Addressed in This Session:   use cognitive  strategies identified in therapy to challenge anxious thoughts  Increase interest, engagement, and pleasure in doing things  Decrease frequency and intensity of feeling down, depressed, hopeless  Improve quantity and quality of night time sleep / decrease daytime naps  Feel less tired and more energy during the day   Identify negative self-talk and behaviors: challenge core beliefs, myths, and actions  Improve concentration, focus, and mindfulness in daily activities   Feel less fidgety, restless or slow in daily activities / interpersonal interactions  Trauma sx mgmt     Intervention:   CBT: reframe negative thinking; challenge anxious thoughts  Interpersonal Therapy: healthy boundary setting, assertive communication    Motivational Interviewing    MI Intervention: Expressed Empathy/Understanding, Supported Autonomy, Collaboration, Evocation, Permission to raise concern or advise, Open-ended questions, and Reflections: simple and complex     Change Talk Expressed by the Patient: Desire to change Ability to change Reasons to change Need to change Committment to change    Provider Response to Change Talk: E - Evoked more info from patient about behavior change, A - Affirmed patient's thoughts, decisions, or attempts at behavior change, R - Reflected patient's change talk, and S - Summarized patient's change talk statements    Assessments completed prior to visit:    PHQ2:       3/6/2025    10:38 AM 3/4/2025     4:57 PM 12/23/2024     8:03 AM 12/8/2024     1:23 PM 7/18/2024     2:33 PM 4/18/2024     2:25 PM 1/3/2024     2:11 PM   PHQ-2 ( 1999 [employer])   Q1: Little interest or pleasure in doing things 0 0 0 0 0 0 0   Q2: Feeling down, depressed or hopeless 0 0 1 0 0 0 0   PHQ-2 Score 0  0  1  0  0 0 0   Q1: Little interest or pleasure in doing things Not at all Not at all Not at all Not at all Not at all Not at all Not at all   Q2: Feeling down, depressed or hopeless Not at all Not at all Several days Not at all Not at  all Not at all Not at all   PHQ-2 Score 0 0 1 0 0 0 0       Patient-reported      GAD2:       11/3/2024     5:27 PM 12/4/2024    10:39 AM 12/23/2024     8:04 AM 1/22/2025     9:16 AM 2/9/2025     9:38 AM 2/19/2025    11:11 AM 3/10/2025     8:08 AM   AMELIA-2   Feeling nervous, anxious, or on edge 1 1 2 3 1 1 1   Not being able to stop or control worrying 2 1 2 3 1 1 1   AMELIA-2 Total Score 3  2  4  6  2  2  2        Patient-reported      ASSESSMENT: Current Emotional / Mental Status (status of significant symptoms):   Risk status (Self / Other harm or suicidal ideation)  Patient denies current homicidal ideation and behaviors.  Patient denies current self-injurious ideation and behaviors.    Patient denied risk behaviors associated with substance use.  Patient denies any high risk behaviors associated with mental health symptoms.  Patient reports the following current concerns for their personal safety: None.  Patient reports there are firearms in the house.     yes, they are secured. The firearms are secured in a locked space.   Recommended that patient call 911 or go to the local ED should there be a change in any of these risk factors    Current Mental Status Exam:   Appearance:                Appropriate    Eye Contact:               Good   Psychomotor:              Normal       Gait / station:           seated  Attitude / Demeanor:   Cooperative  Pleasant  Speech      Rate / Production:   Normal/ Responsive      Volume:                   Normal  volume      Language:               intact  Mood:                          Anxious   Affect:                          Tearful   Thought Content:        Clear   Thought Process:        Coherent       Associations:           No loosening of associations  Insight:                         Fair   Judgment:                   Intact   Orientation:                 All  Attention/concentration:          Fair     Medication Review:   No changes to current psychiatric  medication(s)    Current Outpatient Medications   Medication Sig Dispense Refill    aspirin (ASA) 81 MG chewable tablet Take 81 mg by mouth daily.      calcium carbonate 750 MG CHEW Take 750 mg by mouth daily.      Cyanocobalamin (VITAMIN B-12) 5000 MCG SUBL       lactobacillus rhamnosus, GG, (CULTURELL) capsule Take 1 capsule by mouth 2 times daily      Omeprazole Magnesium (PRILOSEC PO) 20 mg every morning.      Prenatal Vit-Fe Fumarate-FA (PRENATAL MULTIVITAMIN  PLUS IRON) 27-1 MG TABS Take 1 tablet by mouth daily       No current facility-administered medications for this visit.         Medication Compliance:   NA     Changes in Health Issues:   None reported     Chemical Use Review:   Substance Use: Chemical use reviewed, no active concerns identified      Tobacco Use: No current tobacco use.      Diagnosis:  1. Generalized anxiety disorder    2. Trauma and stressor-related disorder      Collateral Reports Completed:   Not Applicable    PLAN: (Patient Tasks / Therapist Tasks / Other)    Pt was encouraged to CONTINUE utilizing healthy boundary setting and assertive communication, considerations for decreasing compounded stressors that is within her control. Writer also encouraged pt to practice self-care, self-josé and self-acceptance, leaning on chosen healthy supports in her life and remind self what she has control over, accept what can't be controlled. Writer encourged pt to continue looking into resources and strategies to improve co-parenting relationship structure of responsibilities with Owen's care and advocating for herself and accommodation at work where necessary to improve quality of life and co-parenting.    Rocío Potts, LICSW  3/24/25                                                       ______________________________________________________________________    Individual Treatment Plan    Patient's Name: Albertina Rojas  YOB: 1985    Date of Creation: 12/9/24  Date  Treatment Plan Last Reviewed/Revised: 3/10/25    DSM5 Diagnoses:   300.02 (F41.1) Generalized Anxiety Disorder with depressive sx and panic attacks  309.9 (F43.9) Unspecified Trauma and Stressor Related Disorder     Psychosocial / Contextual Factors:   Trauma history, relational stressors    PROMIS (reviewed every 90 days): 3/10/25    PROMIS 10-Global Health (only subscores and total score):       3/7/2024     2:26 PM 3/21/2024     9:23 AM 7/13/2024     9:40 PM 11/3/2024     5:27 PM 2/9/2025     9:39 AM 2/19/2025    11:12 AM 3/10/2025     8:09 AM   PROMIS-10 Scores Only   Global Mental Health Score 12 12 13 12  11  14  12    Global Physical Health Score 16 16 14 16  16  13  15    PROMIS TOTAL - SUBSCORES 28 28 27 28  27  27  27        Patient-reported        Referral / Collaboration:  Referral to another professional/service is not indicated at this time..    Anticipated number of session for this episode of care: 6-9 sessions  Anticipation frequency of session: Every other week  Anticipated Duration of each session: 38-52 minutes  Treatment plan will be reviewed in 90 days or when goals have been changed.       MeasurableTreatment Goal(s) related to diagnosis / functional impairment(s)    Goal:  Patient will reduce symptoms and impacts of anxiety - generalized anxiety; effectively reduce anxiety symptoms as evidenced by a reduced GAD7 score of 5 or less with the occurrence of several days or less.    Objective #A:  will experience a reduction in anxiety, will develop  more effective coping skills to manage anxiety symptoms, will develop healthy cognitive patterns and beliefs and will increase ability to function adaptively              Client will use cognitive strategies identified in therapy to challenge anxious thoughts.  Status: CONTINUED 3/10/25    Objective #B:  will experience a reduction in anxiety, will develop more effective coping skills to manage anxiety symptoms, will develop healthy cognitive patterns  and beliefs and will increase ability to function adaptively  Client will use relaxation strategies many times per day to reduce the physical symptoms of anxiety.  Status: CONTINUED 3/10/25    Goal: Patient will experience a reduction in trauma sx; pt will identify and utilize healthy strategies to better manage trauma sx.    Objective #A (Patient Action)    Patient will identify 3 healthy coping strategies to manage panic sx.  Status: CONTINUED 3/10/25    Objective #B  Patient will identify 3 sleep hygiene practices.  Status: CONTINUED 3/10/25    Objective #C  Patient will identify 3 strategies to more effectively address stressors.  Status: CONTINUED 3/10/25    Intervention(s)  Psycho-education regarding mental health diagnoses and treatment options    Skills training  Explore skills useful to client in current situation  Skills include assertiveness, communication, conflict management, problem-solving, relaxation, etc.    Solution-Focused Therapy  Explore patterns in patient's relationships and discussed options for new behaviors  Explore patterns in patient's actions and choices and discussed options for new behaviors    Cognitive-behavioral Therapy  Discuss common cognitive distortions, identified them in patient's life  Explore ways to challenge, replace, and act against these cognitions    Acceptance and Commitment Therapy  Explore and identified important values in patient's life  Discuss ways to commit to behavioral activation around these values    Psychodynamic psychotherapy  Discuss patient's emotional dynamics and issues and how they impact behaviors  Explore patient's history of relationships and how they impact present behaviors  Explore how to work with and make changes in these schemas and patterns    Behavioral Activation  Discuss steps patient can take to become more involved in meaningful activity  Identify barriers to these activities and explored possible solutions    Mindfulness-Based  Strategies  Discuss skills based on development and application of mindfulness  Skills drawn from dialectical behavior therapy, mindfulness-based stress reduction, mindfulness-based cognitive therapy, etc.       Rocío Potts, [facility]  3/10/25

## 2025-03-26 ENCOUNTER — PRENATAL OFFICE VISIT (OUTPATIENT)
Dept: OBGYN | Facility: CLINIC | Age: 40
End: 2025-03-26
Payer: COMMERCIAL

## 2025-03-26 VITALS
HEART RATE: 104 BPM | BODY MASS INDEX: 39.3 KG/M2 | SYSTOLIC BLOOD PRESSURE: 117 MMHG | DIASTOLIC BLOOD PRESSURE: 71 MMHG | WEIGHT: 239.8 LBS

## 2025-03-26 DIAGNOSIS — O09.523 MULTIGRAVIDA OF ADVANCED MATERNAL AGE IN THIRD TRIMESTER: Primary | ICD-10-CM

## 2025-03-26 PROCEDURE — 3078F DIAST BP <80 MM HG: CPT | Performed by: OBSTETRICS & GYNECOLOGY

## 2025-03-26 PROCEDURE — 99207 PR PRENATAL VISIT: CPT | Performed by: OBSTETRICS & GYNECOLOGY

## 2025-03-26 PROCEDURE — 0502F SUBSEQUENT PRENATAL CARE: CPT | Performed by: OBSTETRICS & GYNECOLOGY

## 2025-03-26 PROCEDURE — 3074F SYST BP LT 130 MM HG: CPT | Performed by: OBSTETRICS & GYNECOLOGY

## 2025-03-26 NOTE — PROGRESS NOTES
Presents for routine  appointment.    Pelvic pressure  Wondering about work restrictions, remote work     /71   Pulse 104   Wt 108.8 kg (239 lb 12.8 oz)   LMP 2024 (Exact Date)   BMI 39.30 kg/m        A/P:  39 year old  at 34w4d KENZIE    Pregnancy Complications:  -AMA. NIPT Neg s/p L2  -RNI, MMR PP  -Done having children, hoping partner has vasectomy but not sure if he will. If needs an urgent/unplanned CS, would like a TL. FTP signed today. If no TL, then likely to have IUD placed at PP visit    Routine Prenatal Care:  -Signs and symptoms of  labor discussed as well as fetal kick counts  -Group B Strep at 36+ weeks   -Work note given    Follow up in 2 weeks.    Jinny Zhou, DO

## 2025-03-26 NOTE — LETTER
3/26/2025      Albertina Rojas  [address] [address]        To Whom It May Concern,    Albertina Rojas is under my care for pregnancy. It is my recommendation that she be permitted to work remotely, whenever her job responsibilities and schedule allow, for the remainder of her pregnancy.  Please contact our office with any questions.      Sincerely,        Jinny Zhou, DO    Electronically signed

## 2025-04-03 NOTE — PATIENT INSTRUCTIONS
If you have any questions regarding your visit, Please contact your care team.     [facility]: [phone]    To Schedule an Appointment 24/7  Call: [phone] HOU[facility]LEPHONE NUMBER     Yony Alfaro MD  Medical Director        Mita-ELÍAS Barber-RN  Emily Rincon-Surgery Scheduler  Luz Elena-Surgery Scheduler               Tuesday-[place]  7:30 a.m-4:30 p.m    Thursday-[place]  7:30 a.m-4:30 p.m    Typical Surgery Days: Tuesday or Friday [facility]  [address] [place] [address]  PH: [phone]    Imaging Scheduling all locations  PH: [phone]     [facility] Delivery  [address] [place] [address] [place] [address]  PH: [phone]    [facility]  [address] [place] [address]  PH: [phone] Fax      **Surgeries** Our Surgery Schedulers will contact you to schedule. If you do not receive a call within 3 business days, please call [phone].    Urgent Care locations:  [facility] Monday-Friday  10 am - 8 pm  Saturday and Sunday   9 am - 5 pm  Monday-Friday   10 am - 8 pm  Saturday and Sunday   9 am - 5 pm   [phone]   If you need a medication refill, please contact your pharmacy. Please allow 3 business days for your refill to be completed.  As always, Thank you for trusting us with your healthcare needs!    see additional instructions from your care team below

## 2025-04-07 ENCOUNTER — PRENATAL OFFICE VISIT (OUTPATIENT)
Dept: OBGYN | Facility: CLINIC | Age: 40
End: 2025-04-07
Payer: COMMERCIAL

## 2025-04-07 VITALS
SYSTOLIC BLOOD PRESSURE: 118 MMHG | HEART RATE: 97 BPM | DIASTOLIC BLOOD PRESSURE: 83 MMHG | BODY MASS INDEX: 39.72 KG/M2 | OXYGEN SATURATION: 98 % | WEIGHT: 242.4 LBS

## 2025-04-07 DIAGNOSIS — O09.523 MULTIGRAVIDA OF ADVANCED MATERNAL AGE IN THIRD TRIMESTER: Primary | ICD-10-CM

## 2025-04-07 PROCEDURE — 3079F DIAST BP 80-89 MM HG: CPT | Performed by: OBSTETRICS & GYNECOLOGY

## 2025-04-07 PROCEDURE — 3074F SYST BP LT 130 MM HG: CPT | Performed by: OBSTETRICS & GYNECOLOGY

## 2025-04-07 PROCEDURE — 0502F SUBSEQUENT PRENATAL CARE: CPT | Performed by: OBSTETRICS & GYNECOLOGY

## 2025-04-07 PROCEDURE — 99207 PR PRENATAL VISIT: CPT | Performed by: OBSTETRICS & GYNECOLOGY

## 2025-04-07 PROCEDURE — 87653 STREP B DNA AMP PROBE: CPT | Performed by: OBSTETRICS & GYNECOLOGY

## 2025-04-07 NOTE — PROGRESS NOTES
Patient presents for routine prenatal visit at 36w2d.  Patient without complaint.   PE: See OB vitals  Body mass index is 39.72 kg/m .  Doing well.  No concerns today.  No vaginal bleeding, LOF, contractions.  No HA, RUQ pain, N/V, visual changes.    Group B Strep was done  Transabdominal ultrasound was performed to determine presentation.  A viable intrauterine pregnancy was seen.  The fetus is noted in VERTEX .  Fetal heart motion was visualized at 150 bpm.  Cervix: cl/th/-3  Normal Amniotic Fluid Volume is present.  Questions asked and answered.    Yony Alfaro MD FACOG

## 2025-04-08 LAB — GP B STREP DNA SPEC QL NAA+PROBE: NEGATIVE

## 2025-04-14 ENCOUNTER — OFFICE VISIT (OUTPATIENT)
Dept: PSYCHOLOGY | Facility: CLINIC | Age: 40
End: 2025-04-14
Payer: COMMERCIAL

## 2025-04-14 DIAGNOSIS — F43.9 TRAUMA AND STRESSOR-RELATED DISORDER: ICD-10-CM

## 2025-04-14 DIAGNOSIS — F41.1 GENERALIZED ANXIETY DISORDER: Primary | ICD-10-CM

## 2025-04-14 PROCEDURE — 90834 PSYTX W PT 45 MINUTES: CPT

## 2025-04-14 NOTE — PROGRESS NOTES
[facility]                                     Progress Note    Patient Name: Albertina Rojas  Date 4/14/25         Service Type: Individual      Session Start Time: 833A  Session End Time: 9:20A     Session Length: 47 minutes    Session #: 9    Attendees: Client attended alone    Service Modality:  In-person    DATA  Interactive Complexity: No  Crisis: No  Extended Session (60+ minutes): No          Progress Since Last Session (Related to Symptoms / Goals / Homework):   Symptoms: No change maintains hyperverbalizations and hyperactivity; difficulties sleeping and compounded stressors, difficulty to focus and complete tasks    Homework: Completed in session      Episode of Care Goals: No improvement - PREPARATION (Decided to change - considering how); Intervened by negotiating a change plan and determining options / strategies for behavior change, identifying triggers, exploring social supports, and working towards setting a date to begin behavior change     Current / Ongoing Stressors and Concerns:    Pt this session had a brighter affect compared to others, expressed feelings encompassing giving birth soon. Pt also again reflected on her increased physical discomfort with progressing pregnancy and limited abilities in completing tasks and household maintenance. This has led to prolonged expectation of partner doing more than he typically does, and cause at times strain in the relationship with work/tasks not being as equal. Pt described issues with work, how she is approaching it, considered additional strategies in navigating compounded stressors and anxiety. Pt was tearful during session regarding her difficulties accepting lack of control and how she tries to accept where she can and operate within her controllables. Pt reflects on her dynamics with her ex spouse and co-parenting/custody with their son. Writer and pt worked on cognitive restructuring, practicing reframing the negative  thought through acceptance and acknowledging where the appreciation in the topic can be seen to help feel senses of control and gratitude.      Treatment Objective(s) Addressed in This Session:   use cognitive strategies identified in therapy to challenge anxious thoughts  Increase interest, engagement, and pleasure in doing things  Decrease frequency and intensity of feeling down, depressed, hopeless  Improve quantity and quality of night time sleep / decrease daytime naps  Feel less tired and more energy during the day   Identify negative self-talk and behaviors: challenge core beliefs, myths, and actions  Improve concentration, focus, and mindfulness in daily activities   Feel less fidgety, restless or slow in daily activities / interpersonal interactions  Trauma sx mgmt     Intervention:   CBT: reframe negative thinking; challenge anxious thoughts  Interpersonal Therapy: healthy boundary setting, assertive communication    Motivational Interviewing    MI Intervention: Expressed Empathy/Understanding, Supported Autonomy, Collaboration, Evocation, Permission to raise concern or advise, Open-ended questions, and Reflections: simple and complex     Change Talk Expressed by the Patient: Desire to change Ability to change Reasons to change Need to change Committment to change    Provider Response to Change Talk: E - Evoked more info from patient about behavior change, A - Affirmed patient's thoughts, decisions, or attempts at behavior change, R - Reflected patient's change talk, and S - Summarized patient's change talk statements    Assessments completed prior to visit:    PHQ2:       3/6/2025    10:38 AM 3/4/2025     4:57 PM 12/23/2024     8:03 AM 12/8/2024     1:23 PM 7/18/2024     2:33 PM 4/18/2024     2:25 PM 1/3/2024     2:11 PM   PHQ-2 ( 1999 [employer])   Q1: Little interest or pleasure in doing things 0 0 0 0 0 0 0   Q2: Feeling down, depressed or hopeless 0 0 1 0 0 0 0   PHQ-2 Score 0  0  1  0  0 0 0   Q1: Little  interest or pleasure in doing things Not at all Not at all Not at all Not at all Not at all Not at all Not at all   Q2: Feeling down, depressed or hopeless Not at all Not at all Several days Not at all Not at all Not at all Not at all   PHQ-2 Score 0 0 1 0 0 0 0       Patient-reported      GAD2:       12/4/2024    10:39 AM 12/23/2024     8:04 AM 1/22/2025     9:16 AM 2/9/2025     9:38 AM 2/19/2025    11:11 AM 3/10/2025     8:08 AM 4/11/2025     6:22 PM   AMELIA-2   Feeling nervous, anxious, or on edge 1 2 3 1 1 1 1   Not being able to stop or control worrying 1 2 3 1 1 1 1   AMELIA-2 Total Score 2  4  6  2  2  2  2        Patient-reported      ASSESSMENT: Current Emotional / Mental Status (status of significant symptoms):   Risk status (Self / Other harm or suicidal ideation)  Patient denies current homicidal ideation and behaviors.  Patient denies current self-injurious ideation and behaviors.    Patient denied risk behaviors associated with substance use.  Patient denies any high risk behaviors associated with mental health symptoms.  Patient reports the following current concerns for their personal safety: None.  Patient reports there are firearms in the house.     yes, they are secured. The firearms are secured in a locked space.   Recommended that patient call 911 or go to the local ED should there be a change in any of these risk factors    Current Mental Status Exam:   Appearance:                Appropriate    Eye Contact:               Good   Psychomotor:              Normal       Gait / station:           seated  Attitude / Demeanor:   Cooperative  Pleasant  Speech      Rate / Production:   Normal/ Responsive      Volume:                   Normal  volume      Language:               intact  Mood:                          Anxious   Affect:                          Tearful   Thought Content:        Clear   Thought Process:        Coherent       Associations:           No loosening of associations  Insight:                          Fair   Judgment:                   Intact   Orientation:                 All  Attention/concentration:          Fair     Medication Review:   No changes to current psychiatric medication(s)    Current Outpatient Medications   Medication Sig Dispense Refill    aspirin (ASA) 81 MG chewable tablet Take 81 mg by mouth daily.      calcium carbonate 750 MG CHEW Take 750 mg by mouth daily.      Cyanocobalamin (VITAMIN B-12) 5000 MCG SUBL       lactobacillus rhamnosus, GG, (CULTURELL) capsule Take 1 capsule by mouth 2 times daily      Omeprazole Magnesium (PRILOSEC PO) 20 mg every morning.      Prenatal Vit-Fe Fumarate-FA (PRENATAL MULTIVITAMIN  PLUS IRON) 27-1 MG TABS Take 1 tablet by mouth daily       No current facility-administered medications for this visit.         Medication Compliance:   NA     Changes in Health Issues:   None reported     Chemical Use Review:   Substance Use: Chemical use reviewed, no active concerns identified      Tobacco Use: No current tobacco use.      Diagnosis:  1. Generalized anxiety disorder    2. Trauma and stressor-related disorder      Collateral Reports Completed:   Not Applicable    PLAN: (Patient Tasks / Therapist Tasks / Other)    Pt was encouraged to CONTINUE utilizing healthy boundary setting and assertive communication, considerations for decreasing compounded stressors that is within her control. Writer also encouraged pt to practice self-care, self-josé and self-acceptance, leaning on chosen healthy supports in her life and remind self what she has control over, accept what can't be controlled. Writer encourged pt to continue looking into resources and strategies to improve co-parenting relationship structure of responsibilities with Owen's care and advocating for herself and accommodation at work where necessary to improve quality of life and co-parenting.    Rocío Potts, Health system  4/14/25                                                        ______________________________________________________________________    Individual Treatment Plan    Patient's Name: Albertina Rojas  YOB: 1985    Date of Creation: 12/9/24  Date Treatment Plan Last Reviewed/Revised: 3/10/25    DSM5 Diagnoses:   300.02 (F41.1) Generalized Anxiety Disorder with depressive sx and panic attacks  309.9 (F43.9) Unspecified Trauma and Stressor Related Disorder     Psychosocial / Contextual Factors:   Trauma history, relational stressors    PROMIS (reviewed every 90 days): 3/10/25    PROMIS 10-Global Health (only subscores and total score):       3/7/2024     2:26 PM 3/21/2024     9:23 AM 7/13/2024     9:40 PM 11/3/2024     5:27 PM 2/9/2025     9:39 AM 2/19/2025    11:12 AM 3/10/2025     8:09 AM   PROMIS-10 Scores Only   Global Mental Health Score 12 12 13 12  11  14  12    Global Physical Health Score 16 16 14 16  16  13  15    PROMIS TOTAL - SUBSCORES 28 28 27 28  27  27  27        Patient-reported        Referral / Collaboration:  Referral to another professional/service is not indicated at this time..    Anticipated number of session for this episode of care: 6-9 sessions  Anticipation frequency of session: Every other week  Anticipated Duration of each session: 38-52 minutes  Treatment plan will be reviewed in 90 days or when goals have been changed.       MeasurableTreatment Goal(s) related to diagnosis / functional impairment(s)    Goal:  Patient will reduce symptoms and impacts of anxiety - generalized anxiety; effectively reduce anxiety symptoms as evidenced by a reduced GAD7 score of 5 or less with the occurrence of several days or less.    Objective #A:  will experience a reduction in anxiety, will develop  more effective coping skills to manage anxiety symptoms, will develop healthy cognitive patterns and beliefs and will increase ability to function adaptively              Client will use cognitive strategies identified in therapy to challenge anxious  thoughts.  Status: CONTINUED 3/10/25    Objective #B:  will experience a reduction in anxiety, will develop more effective coping skills to manage anxiety symptoms, will develop healthy cognitive patterns and beliefs and will increase ability to function adaptively  Client will use relaxation strategies many times per day to reduce the physical symptoms of anxiety.  Status: CONTINUED 3/10/25    Goal: Patient will experience a reduction in trauma sx; pt will identify and utilize healthy strategies to better manage trauma sx.    Objective #A (Patient Action)    Patient will identify 3 healthy coping strategies to manage panic sx.  Status: CONTINUED 3/10/25    Objective #B  Patient will identify 3 sleep hygiene practices.  Status: CONTINUED 3/10/25    Objective #C  Patient will identify 3 strategies to more effectively address stressors.  Status: CONTINUED 3/10/25    Intervention(s)  Psycho-education regarding mental health diagnoses and treatment options    Skills training  Explore skills useful to client in current situation  Skills include assertiveness, communication, conflict management, problem-solving, relaxation, etc.    Solution-Focused Therapy  Explore patterns in patient's relationships and discussed options for new behaviors  Explore patterns in patient's actions and choices and discussed options for new behaviors    Cognitive-behavioral Therapy  Discuss common cognitive distortions, identified them in patient's life  Explore ways to challenge, replace, and act against these cognitions    Acceptance and Commitment Therapy  Explore and identified important values in patient's life  Discuss ways to commit to behavioral activation around these values    Psychodynamic psychotherapy  Discuss patient's emotional dynamics and issues and how they impact behaviors  Explore patient's history of relationships and how they impact present behaviors  Explore how to work with and make changes in these schemas and  patterns    Behavioral Activation  Discuss steps patient can take to become more involved in meaningful activity  Identify barriers to these activities and explored possible solutions    Mindfulness-Based Strategies  Discuss skills based on development and application of mindfulness  Skills drawn from dialectical behavior therapy, mindfulness-based stress reduction, mindfulness-based cognitive therapy, etc.       Rocío Potts, [facility]  3/10/25

## 2025-04-16 ENCOUNTER — PRENATAL OFFICE VISIT (OUTPATIENT)
Dept: OBGYN | Facility: CLINIC | Age: 40
End: 2025-04-16
Payer: COMMERCIAL

## 2025-04-16 VITALS
HEART RATE: 84 BPM | BODY MASS INDEX: 40.54 KG/M2 | DIASTOLIC BLOOD PRESSURE: 81 MMHG | SYSTOLIC BLOOD PRESSURE: 119 MMHG | WEIGHT: 247.4 LBS

## 2025-04-16 DIAGNOSIS — O09.523 MULTIGRAVIDA OF ADVANCED MATERNAL AGE IN THIRD TRIMESTER: Primary | ICD-10-CM

## 2025-04-16 PROCEDURE — 99207 PR PRENATAL VISIT: CPT | Performed by: OBSTETRICS & GYNECOLOGY

## 2025-04-16 PROCEDURE — 0502F SUBSEQUENT PRENATAL CARE: CPT | Performed by: OBSTETRICS & GYNECOLOGY

## 2025-04-16 PROCEDURE — 3079F DIAST BP 80-89 MM HG: CPT | Performed by: OBSTETRICS & GYNECOLOGY

## 2025-04-16 PROCEDURE — 3074F SYST BP LT 130 MM HG: CPT | Performed by: OBSTETRICS & GYNECOLOGY

## 2025-04-16 NOTE — PROGRESS NOTES
Presents for routine  appointment.      /81   Pulse 84   Wt 112.2 kg (247 lb 6.4 oz)   LMP 2024 (Exact Date)   BMI 40.54 kg/m        A/P:  39 year old  at 37w4d KENZIE    Pregnancy Complications:  -AMA. NIPT Neg s/p L2. Desires IOL in the 39th week, discussed types of induction and timing.   -RNI, MMR PP  -Done having children, hoping partner has vasectomy but not sure if he will. If needs an urgent/unplanned CS, would like a TL. FTP signed at 34wks. If no TL, then likely to have IUD placed at PP visit    Routine Prenatal Care:  -Signs and symptoms of  labor discussed as well as fetal kick counts  -Group B Strep neg    Follow up in 1 week.    Jinny Zhou, DO

## 2025-04-21 NOTE — PATIENT INSTRUCTIONS
Week 38 of Your Pregnancy: Care Instructions  Believe it or not, your baby is almost here. You may notice how your baby responds to sounds, warmth, cold, and light. You may even know what kind of music your baby likes.    Even if you expect a vaginal birth, it's a good idea to learn about  section ().  is the delivery of a baby through a cut (incision) in your belly. It's a major surgery, so it has more risks than a vaginal birth.   During the first 2 weeks after the birth, limit visitors. Don't allow visitors who have colds or infections. Ask visitors to wash their hands before they touch your baby. And never let anyone smoke around your baby.     Know about unplanned C-sections.  Reasons for an unplanned  include labor that slows or stops, signs of distress in your baby, and high blood pressure or other problems for you.     Know about planned C-sections.  If your baby isn't in a head-down position for delivery (breech position), your doctor may plan a . Or you may have a planned  if you're having twins or more.     Get as much help as you can while you're in the hospital.  You can learn about feeding, diapering, and bathing your baby.     Talk to your doctor or midwife about how to care for the umbilical cord stump.  If your baby will be circumcised, also ask about how to care for that.     Ask friends or family for help, as you need it.  If you can, have another adult in your home for at least 2 or 3 days after the birth.     Try to nap when your baby naps.  This may be your best chance to get some sleep.     Watch for changes in your mental health.  For the first 1 to 2 weeks after birth, it's common to cry or feel sad or irritable. If these feelings last for more than 2 weeks, you may have postpartum depression. Ask your doctor for help. Postpartum depression can be treated.   Follow-up care is a key part of your treatment and safety. Be sure to make and go  "to all appointments, and call your doctor if you are having problems. It's also a good idea to know your test results and keep a list of the medicines you take.  Where can you learn more?  Go to https://www.[employer].net/patiented  Enter B044 in the search box to learn more about \"Week 38 of Your Pregnancy: Care Instructions.\"  Current as of: April 30, 2024  Content Version: 14.4    0092-0807 [employer].   Care instructions adapted under license by your healthcare professional. If you have questions about a medical condition or this instruction, always ask your healthcare professional. [employer] disclaims any warranty or liability for your use of this information.                                                         If you have any questions regarding your visit, Please contact your care team.     [employer]: [phone]    To Schedule an Appointment 24/7  Call: [phone] HOURS TELEPHONE NUMBER     Yony Alfaro MD  Medical Director        [facility]-Surgery Scheduler  Luz Elena-Surgery Scheduler               Tuesday-[place]  7:30 a.m-4:30 p.m    Thursday-[place]  7:30 a.m-4:30 p.m    Typical Surgery Days: Tuesday or Friday [facility]  [address] [place] [address]  PH: [phone]    Imaging Scheduling all locations  PH: [phone]     [facility]  [address] [place] [address]  PH: [phone]    [facility]  [address]  [place] [address]  PH: [phone] Fax      **Surgeries** Our Surgery Schedulers will contact you to schedule. If you do not receive a call within 3 business days, please call [phone].    Urgent Care locations:  [facility] Monday-Friday  10 am - 8 pm  Saturday and Sunday   9 am - 5 pm  Monday-Friday   10 am - 8 pm  Saturday and Sunday   9 am - 5 pm   [phone]   If you need a medication refill, please contact your pharmacy. Please allow 3 business days for your refill to be completed.  As always, Thank you for trusting us with your healthcare needs!    see additional instructions from your care team below

## 2025-04-24 ENCOUNTER — PRENATAL OFFICE VISIT (OUTPATIENT)
Dept: OBGYN | Facility: CLINIC | Age: 40
End: 2025-04-24
Payer: COMMERCIAL

## 2025-04-24 VITALS
HEART RATE: 100 BPM | DIASTOLIC BLOOD PRESSURE: 78 MMHG | WEIGHT: 245.6 LBS | BODY MASS INDEX: 40.25 KG/M2 | OXYGEN SATURATION: 96 % | SYSTOLIC BLOOD PRESSURE: 116 MMHG

## 2025-04-24 DIAGNOSIS — O09.523 MULTIGRAVIDA OF ADVANCED MATERNAL AGE IN THIRD TRIMESTER: Primary | ICD-10-CM

## 2025-04-24 NOTE — PROGRESS NOTES
Patient presents for routine prenatal visit at 38w5d.  Patient without complaint.   PE: See OB vitals  There is no height or weight on file to calculate BMI.  No vaginal bleeding, LOF, contractions.  No HA, RUQ pain, N/V, visual changes.  Cervix is External os 1cm int closed, soft,-3.  Questions asked and answered.      Yony Alfaro MD FACOG

## 2025-04-24 NOTE — PATIENT INSTRUCTIONS
"Week 39 of Your Pregnancy: Care Instructions     babies can look different than what you see in pictures or movies. Their heads can be a strange shape right after birth. And they may have swollen eyes and red marks on their faces.   You can still get pregnant even if you are breastfeeding. If you don't want to get pregnant, talk to your doctor about birth control.   Tips for week 39 of pregnancy  If you plan to breastfeed, get prepared.       Continue to eat healthy foods.  Keep taking your prenatal vitamins during breastfeeding if your doctor recommends it.  Talk to your doctor before taking any medicines or supplements.  Choose the right birth control for you.       Intrauterine devices (IUDs) are placed in the uterus. Sometimes the IUD can be placed right after giving birth. They work for years.  Hormonal implants are placed under the skin of the arm. They also work for years.  Depo-Provera is a shot. You get it every 3 months.  Birth control pills can be used. They're taken every day.  Tubal ligation (tying your tubes) and vasectomy are surgeries. They're permanent.  Diaphragms, spermicide, and condoms must be used each time you have sex. If you used a diaphragm before, you should get refitted after the baby is born.  A birth control patch or ring can be used. These just can't be started until several weeks after you give birth.  Follow-up care is a key part of your treatment and safety. Be sure to make and go to all appointments, and call your doctor if you are having problems. It's also a good idea to know your test results and keep a list of the medicines you take.  Where can you learn more?  Go to https://www.[employer]wheel.net/patiented  Enter A811 in the search box to learn more about \"Week 39 of Your Pregnancy: Care Instructions.\"  Current as of: 2024  Content Version: 14.4    1535-8347 [employer].   Care instructions adapted under license by your healthcare professional. If you " have questions about a medical condition or this instruction, always ask your healthcare professional. [employer] disclaims any warranty or liability for your use of this information.                                                         If you have any questions regarding your visit, Please contact your care team.     [employer]: [phone]    To Schedule an Appointment 24/7  Call: [phone]. [facility] HOURS TELEPHONE NUMBER     Yony Alfaro MD  Medical Director        [facility]-Surgery Scheduler  Luz Elena-Surgery Scheduler               Tuesday-[place]  7:30 a.m-4:30 p.m    Thursday-[place]  7:30 a.m-4:30 p.m    Typical Surgery Days: Tuesday or Friday [facility] [place]  [address] [place] [address]  PH: [phone]    Imaging Scheduling all locations  PH: [phone]     [facility]  [address] [place] [address]  PH: [phone]    [facility]  [address] [place] [address]  PH: [phone] Fax      **Surgeries** Our Surgery Schedulers will contact you to schedule. If you do not receive a call within 3 business days, please call [phone].    Urgent Care locations:  [facility] Monday-Friday  10 am - 8 pm  Saturday and Sunday   9 am - 5 pm  Monday-Friday   10 am - 8 pm  Saturday and Sunday   9 am - 5 pm   [phone]   If you need a medication refill, please contact your pharmacy. Please allow 3 business days for your refill to be completed.  As always, Thank you for trusting us with your healthcare needs!    see additional instructions from your care team below

## 2025-04-28 ENCOUNTER — PRENATAL OFFICE VISIT (OUTPATIENT)
Dept: OBGYN | Facility: CLINIC | Age: 40
End: 2025-04-28
Payer: COMMERCIAL

## 2025-04-28 VITALS
OXYGEN SATURATION: 97 % | BODY MASS INDEX: 40.15 KG/M2 | SYSTOLIC BLOOD PRESSURE: 115 MMHG | HEART RATE: 87 BPM | WEIGHT: 245 LBS | DIASTOLIC BLOOD PRESSURE: 81 MMHG

## 2025-04-28 DIAGNOSIS — O09.523 MULTIGRAVIDA OF ADVANCED MATERNAL AGE IN THIRD TRIMESTER: Primary | ICD-10-CM

## 2025-04-28 PROCEDURE — 3074F SYST BP LT 130 MM HG: CPT | Performed by: OBSTETRICS & GYNECOLOGY

## 2025-04-28 PROCEDURE — 0502F SUBSEQUENT PRENATAL CARE: CPT | Performed by: OBSTETRICS & GYNECOLOGY

## 2025-04-28 PROCEDURE — 3079F DIAST BP 80-89 MM HG: CPT | Performed by: OBSTETRICS & GYNECOLOGY

## 2025-04-28 PROCEDURE — 99207 PR PRENATAL VISIT: CPT | Performed by: OBSTETRICS & GYNECOLOGY

## 2025-04-28 NOTE — PROGRESS NOTES
Patient presents for routine prenatal visit at 39w2d.  Patient without complaint.   PE: See OB vitals  Body mass index is 40.15 kg/m .  Doing well.  No concerns today.  No vaginal bleeding, LOF, contractions.  No HA, RUQ pain, N/V, visual changes.  Cervix is posterior, long, closed and firm.  Discussed indications, risks, complications and failure rate of inductions.  Questions asked and answered.  Dilipan 4/29 at 4 pm and IOL 4/30 at 6 am    Yony Alfaro MD FACOG

## 2025-05-01 PROBLEM — Z98.51 S/P TUBAL LIGATION: Status: ACTIVE | Noted: 2025-05-01

## 2025-05-05 ENCOUNTER — TELEPHONE (OUTPATIENT)
Dept: OBGYN | Facility: CLINIC | Age: 40
End: 2025-05-05
Payer: COMMERCIAL

## 2025-05-05 NOTE — TELEPHONE ENCOUNTER
Unable to reach patient via phone. Left message to call back at [phone].    Elisabeth Martines RN-[facility]

## 2025-05-05 NOTE — TELEPHONE ENCOUNTER
Relayed result note below from Dr. Washington to the pt.    Pt has no further questions or concerns.    Elisabeth Martines RN- OB/[facility]N group

## 2025-05-05 NOTE — TELEPHONE ENCOUNTER
Specimens:   A) - Fallopian Tube Left, Sterilization                                                              B) - Fallopian Tube Right, Sterilization                                                  Final Diagnosis   A.Left fallopian tube, salpingectomy: Fimbriated fallopian tube without atypia or malignancy.  B.  Right fallopian tube, salpingectomy: Fimbriated fallopian tube with paratubal cyst.  Negative for atypia or malignancy.       Please call patient with her normal pathology results from surgery.  Let her know that there are no signs of cancer. Thanks!    ~Ai Washington, DO

## 2025-05-14 ENCOUNTER — VIRTUAL VISIT (OUTPATIENT)
Dept: PSYCHOLOGY | Facility: CLINIC | Age: 40
End: 2025-05-14
Payer: COMMERCIAL

## 2025-05-14 DIAGNOSIS — F43.9 TRAUMA AND STRESSOR-RELATED DISORDER: ICD-10-CM

## 2025-05-14 DIAGNOSIS — F41.1 GENERALIZED ANXIETY DISORDER: Primary | ICD-10-CM

## 2025-05-14 PROCEDURE — 90834 PSYTX W PT 45 MINUTES: CPT | Mod: 95

## 2025-05-14 NOTE — PROGRESS NOTES
"[facility]                                     Progress Note    Patient Name: Albertina Rojas  Date 5/14/25         Service Type: Individual      Session Start Time: 8:36A  Session End Time: 9:16A     Session Length: 40 minutes    Session #: 10    Attendees: Client attended alone    Service Modality:  In-person    DATA  Interactive Complexity: No  Crisis: No  Extended Session (60+ minutes): No          Progress Since Last Session (Related to Symptoms / Goals / Homework):   Symptoms: No change maintains hyperverbalizations and hyperactivity; difficulties sleeping and compounded stressors, difficulty to focus and complete tasks    Homework: Completed in session      Episode of Care Goals: No improvement - PREPARATION (Decided to change - considering how); Intervened by negotiating a change plan and determining options / strategies for behavior change, identifying triggers, exploring social supports, and working towards setting a date to begin behavior change     Current / Ongoing Stressors and Concerns:    Pt just had her baby a couple weeks ago. Pt reflected on her time away from work, being away from work, the labor and delivery, support she has right now and improving daily functioning. 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 Pt says she is waking up to feed every couple hours, articulates strategies in managing stress and anxiety. Umbilical cord loosely wrapped around her neck and stomach. Shamir \"has been wonderful\" regarding support, took a month off. Pt still hosted her garage sale and spoke of these dynamics. Pt also processed the co-parenting and Owen during this time, how Stefano is processing this transition. Pt reflected on her mood post partum, previous \"pregnancy-rage\" house is messier and sleep deprived. Writer and pt worked on cognitive restructuring, practicing reframing the negative thought through acceptance and acknowledging where the " appreciation in the topic can be seen to help feel senses of control and gratitude.      Treatment Objective(s) Addressed in This Session:   use cognitive strategies identified in therapy to challenge anxious thoughts  Increase interest, engagement, and pleasure in doing things  Decrease frequency and intensity of feeling down, depressed, hopeless  Improve quantity and quality of night time sleep / decrease daytime naps  Feel less tired and more energy during the day   Identify negative self-talk and behaviors: challenge core beliefs, myths, and actions  Improve concentration, focus, and mindfulness in daily activities   Feel less fidgety, restless or slow in daily activities / interpersonal interactions  Trauma sx mgmt     Intervention:   CBT: reframe negative thinking; challenge anxious thoughts  Interpersonal Therapy: healthy boundary setting, assertive communication    Motivational Interviewing    MI Intervention: Expressed Empathy/Understanding, Supported Autonomy, Collaboration, Evocation, Permission to raise concern or advise, Open-ended questions, and Reflections: simple and complex     Change Talk Expressed by the Patient: Desire to change Ability to change Reasons to change Need to change Committment to change    Provider Response to Change Talk: E - Evoked more info from patient about behavior change, A - Affirmed patient's thoughts, decisions, or attempts at behavior change, R - Reflected patient's change talk, and S - Summarized patient's change talk statements    Assessments completed prior to visit:    PHQ2:       3/6/2025    10:38 AM 3/4/2025     4:57 PM 12/23/2024     8:03 AM 12/8/2024     1:23 PM 7/18/2024     2:33 PM 4/18/2024     2:25 PM 1/3/2024     2:11 PM   PHQ-2 ( 1999 [employer])   Q1: Little interest or pleasure in doing things 0 0 0 0 0 0 0   Q2: Feeling down, depressed or hopeless 0 0 1 0 0 0 0   PHQ-2 Score 0  0  1  0  0 0 0   Q1: Little interest or pleasure in doing things Not at all Not at  all Not at all Not at all Not at all Not at all Not at all   Q2: Feeling down, depressed or hopeless Not at all Not at all Several days Not at all Not at all Not at all Not at all   PHQ-2 Score 0 0 1 0 0 0 0       Patient-reported      GAD2:       12/23/2024     8:04 AM 1/22/2025     9:16 AM 2/9/2025     9:38 AM 2/19/2025    11:11 AM 3/10/2025     8:08 AM 4/11/2025     6:22 PM 5/13/2025     8:56 AM   AMELIA-2   Feeling nervous, anxious, or on edge 2 3 1 1 1 1 1   Not being able to stop or control worrying 2 3 1 1 1 1 1   AMELIA-2 Total Score 4  6  2  2  2  2  2        Patient-reported      ASSESSMENT: Current Emotional / Mental Status (status of significant symptoms):   Risk status (Self / Other harm or suicidal ideation)  Patient denies current homicidal ideation and behaviors.  Patient denies current self-injurious ideation and behaviors.    Patient denied risk behaviors associated with substance use.  Patient denies any high risk behaviors associated with mental health symptoms.  Patient reports the following current concerns for their personal safety: None.  Patient reports there are firearms in the house.     yes, they are secured. The firearms are secured in a locked space.   Recommended that patient call 911 or go to the local ED should there be a change in any of these risk factors    Current Mental Status Exam:   Appearance:                Appropriate    Eye Contact:               Good   Psychomotor:              Normal       Gait / station:           seated  Attitude / Demeanor:   Cooperative  Pleasant  Speech      Rate / Production:   Normal/ Responsive      Volume:                   Normal  volume      Language:               intact  Mood:                          Anxious   Affect:                          Tearful   Thought Content:        Clear   Thought Process:        Coherent       Associations:           No loosening of associations  Insight:                         Fair   Judgment:                    Intact   Orientation:                 All  Attention/concentration:          Fair     Medication Review:   No changes to current psychiatric medication(s)    Current Outpatient Medications   Medication Sig Dispense Refill    aspirin (ASA) 81 MG chewable tablet Take 81 mg by mouth daily.      calcium carbonate 750 MG CHEW Take 750 mg by mouth daily.      Cyanocobalamin (VITAMIN B-12) 5000 MCG SUBL       lactobacillus rhamnosus, GG, (CULTURELL) capsule Take 1 capsule by mouth 2 times daily      Omeprazole Magnesium (PRILOSEC PO) 20 mg every morning.      Prenatal Vit-Fe Fumarate-FA (PRENATAL MULTIVITAMIN  PLUS IRON) 27-1 MG TABS Take 1 tablet by mouth daily       No current facility-administered medications for this visit.         Medication Compliance:   NA     Changes in Health Issues:   None reported     Chemical Use Review:   Substance Use: Chemical use reviewed, no active concerns identified      Tobacco Use: No current tobacco use.      Diagnosis:  1. Generalized anxiety disorder    2. Trauma and stressor-related disorder      Collateral Reports Completed:   Not Applicable    PLAN: (Patient Tasks / Therapist Tasks / Other)    Pt was encouraged to CONTINUE utilizing healthy boundary setting and assertive communication, considerations for decreasing compounded stressors that is within her control. Writer also encouraged pt to practice self-care, self-josé and self-acceptance, leaning on chosen healthy supports in her life and remind self what she has control over, accept what can't be controlled. Writer encourged pt to continue looking into resources and strategies to improve co-parenting relationship structure of responsibilities with Owen's care and advocating for herself and accommodation at work where necessary to improve quality of life and co-parenting.    Rocío Potts, [facility]  5/14/25                                                        ______________________________________________________________________    Individual Treatment Plan    Patient's Name: Albertina Rojas  YOB: 1985    Date of Creation: 12/9/24  Date Treatment Plan Last Reviewed/Revised: 3/10/25    DSM5 Diagnoses:   300.02 (F41.1) Generalized Anxiety Disorder with depressive sx and panic attacks  309.9 (F43.9) Unspecified Trauma and Stressor Related Disorder     Psychosocial / Contextual Factors:   Trauma history, relational stressors    PROMIS (reviewed every 90 days): 3/10/25    PROMIS 10-Global Health (only subscores and total score):       3/7/2024     2:26 PM 3/21/2024     9:23 AM 7/13/2024     9:40 PM 11/3/2024     5:27 PM 2/9/2025     9:39 AM 2/19/2025    11:12 AM 3/10/2025     8:09 AM   PROMIS-10 Scores Only   Global Mental Health Score 12 12 13 12  11  14  12    Global Physical Health Score 16 16 14 16  16  13  15    PROMIS TOTAL - SUBSCORES 28 28 27 28  27  27  27        Patient-reported        Referral / Collaboration:  Referral to another professional/service is not indicated at this time..    Anticipated number of session for this episode of care: 6-9 sessions  Anticipation frequency of session: Every other week  Anticipated Duration of each session: 38-52 minutes  Treatment plan will be reviewed in 90 days or when goals have been changed.       MeasurableTreatment Goal(s) related to diagnosis / functional impairment(s)    Goal:  Patient will reduce symptoms and impacts of anxiety - generalized anxiety; effectively reduce anxiety symptoms as evidenced by a reduced GAD7 score of 5 or less with the occurrence of several days or less.    Objective #A:  will experience a reduction in anxiety, will develop  more effective coping skills to manage anxiety symptoms, will develop healthy cognitive patterns and beliefs and will increase ability to function adaptively              Client will use cognitive strategies identified in therapy to challenge anxious  thoughts.  Status: CONTINUED 3/10/25    Objective #B:  will experience a reduction in anxiety, will develop more effective coping skills to manage anxiety symptoms, will develop healthy cognitive patterns and beliefs and will increase ability to function adaptively  Client will use relaxation strategies many times per day to reduce the physical symptoms of anxiety.  Status: CONTINUED 3/10/25    Goal: Patient will experience a reduction in trauma sx; pt will identify and utilize healthy strategies to better manage trauma sx.    Objective #A (Patient Action)    Patient will identify 3 healthy coping strategies to manage panic sx.  Status: CONTINUED 3/10/25    Objective #B  Patient will identify 3 sleep hygiene practices.  Status: CONTINUED 3/10/25    Objective #C  Patient will identify 3 strategies to more effectively address stressors.  Status: CONTINUED 3/10/25    Intervention(s)  Psycho-education regarding mental health diagnoses and treatment options    Skills training  Explore skills useful to client in current situation  Skills include assertiveness, communication, conflict management, problem-solving, relaxation, etc.    Solution-Focused Therapy  Explore patterns in patient's relationships and discussed options for new behaviors  Explore patterns in patient's actions and choices and discussed options for new behaviors    Cognitive-behavioral Therapy  Discuss common cognitive distortions, identified them in patient's life  Explore ways to challenge, replace, and act against these cognitions    Acceptance and Commitment Therapy  Explore and identified important values in patient's life  Discuss ways to commit to behavioral activation around these values    Psychodynamic psychotherapy  Discuss patient's emotional dynamics and issues and how they impact behaviors  Explore patient's history of relationships and how they impact present behaviors  Explore how to work with and make changes in these schemas and  patterns    Behavioral Activation  Discuss steps patient can take to become more involved in meaningful activity  Identify barriers to these activities and explored possible solutions    Mindfulness-Based Strategies  Discuss skills based on development and application of mindfulness  Skills drawn from dialectical behavior therapy, mindfulness-based stress reduction, mindfulness-based cognitive therapy, etc.       Rocío Potts, [facility]  3/10/25

## 2025-05-27 ENCOUNTER — VIRTUAL VISIT (OUTPATIENT)
Dept: PSYCHOLOGY | Facility: CLINIC | Age: 40
End: 2025-05-27
Payer: COMMERCIAL

## 2025-05-27 DIAGNOSIS — F43.9 TRAUMA AND STRESSOR-RELATED DISORDER: ICD-10-CM

## 2025-05-27 DIAGNOSIS — F41.1 GENERALIZED ANXIETY DISORDER: Primary | ICD-10-CM

## 2025-05-27 PROCEDURE — 90832 PSYTX W PT 30 MINUTES: CPT | Mod: 95

## 2025-05-27 NOTE — PROGRESS NOTES
"[facility]                                     Progress Note    Patient Name: Albertina Rojas  Date 5/27/25         Service Type: Individual      Session Start Time: 2:07P  Session End Time: 2:42P     Session Length: 35 minutes    Session #: 11    Attendees: Client attended alone    Service Modality:  In-person    DATA  Interactive Complexity: No  Crisis: No  Extended Session (60+ minutes): No          Progress Since Last Session (Related to Symptoms / Goals / Homework):   Symptoms: No change maintains hyperverbalizations and hyperactivity; difficulties sleeping and compounded stressors, difficulty to focus and complete tasks    Homework: Completed in session      Episode of Care Goals: No improvement - PREPARATION (Decided to change - considering how); Intervened by negotiating a change plan and determining options / strategies for behavior change, identifying triggers, exploring social supports, and working towards setting a date to begin behavior change     Current / Ongoing Stressors and Concerns:    Pt finds the sleeping is normalizing sleep deprivation. Pt reports ways her and partner are supporting one another during this time, maintaining effective communication and reflecting on these dynamics. Pt reflected on her mood post partum, previous \"pregnancy-rage\" house is messier and sleep deprived. Pt processed adjustment with Owen how they are navigating this transition. Pt feels anxiety and overall mood has been \"good.\" Trusting partner to manage baby's cries can be heightened emotions for writer. Writer and pt worked on cognitive restructuring, reframing semantics/generalizations, negative thoughts through acceptance and acknowledging where the appreciation in the topic can be seen to help feel senses of control and gratitude.      Treatment Objective(s) Addressed in This Session:   use cognitive strategies identified in therapy to challenge anxious thoughts  Increase interest, " engagement, and pleasure in doing things  Decrease frequency and intensity of feeling down, depressed, hopeless  Improve quantity and quality of night time sleep / decrease daytime naps  Feel less tired and more energy during the day   Identify negative self-talk and behaviors: challenge core beliefs, myths, and actions  Improve concentration, focus, and mindfulness in daily activities   Feel less fidgety, restless or slow in daily activities / interpersonal interactions  Trauma sx mgmt     Intervention:   CBT: reframe negative thinking; challenge anxious thoughts  Interpersonal Therapy: healthy boundary setting, assertive communication    Motivational Interviewing    MI Intervention: Expressed Empathy/Understanding, Supported Autonomy, Collaboration, Evocation, Permission to raise concern or advise, Open-ended questions, and Reflections: simple and complex     Change Talk Expressed by the Patient: Desire to change Ability to change Reasons to change Need to change Committment to change    Provider Response to Change Talk: E - Evoked more info from patient about behavior change, A - Affirmed patient's thoughts, decisions, or attempts at behavior change, R - Reflected patient's change talk, and S - Summarized patient's change talk statements    Assessments completed prior to visit:    PHQ2:       3/6/2025    10:38 AM 3/4/2025     4:57 PM 12/23/2024     8:03 AM 12/8/2024     1:23 PM 7/18/2024     2:33 PM 4/18/2024     2:25 PM 1/3/2024     2:11 PM   PHQ-2 ( 1999 [employer])   Q1: Little interest or pleasure in doing things 0 0 0 0 0 0 0   Q2: Feeling down, depressed or hopeless 0 0 1 0 0 0 0   PHQ-2 Score 0  0  1  0  0 0 0   Q1: Little interest or pleasure in doing things Not at all Not at all Not at all Not at all Not at all Not at all Not at all   Q2: Feeling down, depressed or hopeless Not at all Not at all Several days Not at all Not at all Not at all Not at all   PHQ-2 Score 0 0 1 0 0 0 0       Patient-reported       GAD2:       12/23/2024     8:04 AM 1/22/2025     9:16 AM 2/9/2025     9:38 AM 2/19/2025    11:11 AM 3/10/2025     8:08 AM 4/11/2025     6:22 PM 5/13/2025     8:56 AM   AMELIA-2   Feeling nervous, anxious, or on edge 2 3 1 1 1 1 1   Not being able to stop or control worrying 2 3 1 1 1 1 1   AMELIA-2 Total Score 4  6  2  2  2  2  2        Patient-reported      ASSESSMENT: Current Emotional / Mental Status (status of significant symptoms):   Risk status (Self / Other harm or suicidal ideation)  Patient denies current homicidal ideation and behaviors.  Patient denies current self-injurious ideation and behaviors.    Patient denied risk behaviors associated with substance use.  Patient denies any high risk behaviors associated with mental health symptoms.  Patient reports the following current concerns for their personal safety: None.  Patient reports there are firearms in the house.     yes, they are secured. The firearms are secured in a locked space.   Recommended that patient call 911 or go to the local ED should there be a change in any of these risk factors    Current Mental Status Exam:   Appearance:                Appropriate    Eye Contact:               Good   Psychomotor:              Normal       Gait / station:           seated  Attitude / Demeanor:   Cooperative  Pleasant  Speech      Rate / Production:   Normal/ Responsive      Volume:                   Normal  volume      Language:               intact  Mood:                          Anxious   Affect:                          Tearful   Thought Content:        Clear   Thought Process:        Coherent       Associations:           No loosening of associations  Insight:                         Fair   Judgment:                   Intact   Orientation:                 All  Attention/concentration:          Fair     Medication Review:   No changes to current psychiatric medication(s)    Current Outpatient Medications   Medication Sig Dispense Refill    aspirin (ASA)  81 MG chewable tablet Take 81 mg by mouth daily.      calcium carbonate 750 MG CHEW Take 750 mg by mouth daily.      Cyanocobalamin (VITAMIN B-12) 5000 MCG SUBL       lactobacillus rhamnosus, GG, (CULTURELL) capsule Take 1 capsule by mouth 2 times daily      Omeprazole Magnesium (PRILOSEC PO) 20 mg every morning.      Prenatal Vit-Fe Fumarate-FA (PRENATAL MULTIVITAMIN  PLUS IRON) 27-1 MG TABS Take 1 tablet by mouth daily       No current facility-administered medications for this visit.         Medication Compliance:   NA     Changes in Health Issues:   None reported     Chemical Use Review:   Substance Use: Chemical use reviewed, no active concerns identified      Tobacco Use: No current tobacco use.      Diagnosis:  1. Generalized anxiety disorder    2. Trauma and stressor-related disorder      Collateral Reports Completed:   Not Applicable    PLAN: (Patient Tasks / Therapist Tasks / Other)    Pt was encouraged to CONTINUE utilizing healthy boundary setting and assertive communication, considerations for decreasing compounded stressors that is within her control. Writer also encouraged pt to practice self-care, self-josé and self-acceptance, leaning on chosen healthy supports in her life and remind self what she has control over, accept what can't be controlled. Writer encourged pt to continue looking into resources and strategies to improve co-parenting relationship structure of responsibilities with Owen's care and advocating for herself and accommodation at work where necessary to improve quality of life and co-parenting.    Rocío Potts, [facility]  5/27/25                                                       ______________________________________________________________________    Individual Treatment Plan    Patient's Name: Albertina Rojas  YOB: 1985    Date of Creation: 12/9/24  Date Treatment Plan Last Reviewed/Revised: 3/10/25    DSM5 Diagnoses:   300.02 (F41.1) Generalized Anxiety  Disorder with depressive sx and panic attacks  309.9 (F43.9) Unspecified Trauma and Stressor Related Disorder     Psychosocial / Contextual Factors:   Trauma history, relational stressors    PROMIS (reviewed every 90 days): 3/10/25    PROMIS 10-Global Health (only subscores and total score):       3/7/2024     2:26 PM 3/21/2024     9:23 AM 7/13/2024     9:40 PM 11/3/2024     5:27 PM 2/9/2025     9:39 AM 2/19/2025    11:12 AM 3/10/2025     8:09 AM   PROMIS-10 Scores Only   Global Mental Health Score 12 12 13 12  11  14  12    Global Physical Health Score 16 16 14 16  16  13  15    PROMIS TOTAL - SUBSCORES 28 28 27 28  27  27  27        Patient-reported        Referral / Collaboration:  Referral to another professional/service is not indicated at this time..    Anticipated number of session for this episode of care: 6-9 sessions  Anticipation frequency of session: Every other week  Anticipated Duration of each session: 38-52 minutes  Treatment plan will be reviewed in 90 days or when goals have been changed.       MeasurableTreatment Goal(s) related to diagnosis / functional impairment(s)    Goal:  Patient will reduce symptoms and impacts of anxiety - generalized anxiety; effectively reduce anxiety symptoms as evidenced by a reduced GAD7 score of 5 or less with the occurrence of several days or less.    Objective #A:  will experience a reduction in anxiety, will develop  more effective coping skills to manage anxiety symptoms, will develop healthy cognitive patterns and beliefs and will increase ability to function adaptively              Client will use cognitive strategies identified in therapy to challenge anxious thoughts.  Status: CONTINUED 3/10/25    Objective #B:  will experience a reduction in anxiety, will develop more effective coping skills to manage anxiety symptoms, will develop healthy cognitive patterns and beliefs and will increase ability to function adaptively  Client will use relaxation strategies  many times per day to reduce the physical symptoms of anxiety.  Status: CONTINUED 3/10/25    Goal: Patient will experience a reduction in trauma sx; pt will identify and utilize healthy strategies to better manage trauma sx.    Objective #A (Patient Action)    Patient will identify 3 healthy coping strategies to manage panic sx.  Status: CONTINUED 3/10/25    Objective #B  Patient will identify 3 sleep hygiene practices.  Status: CONTINUED 3/10/25    Objective #C  Patient will identify 3 strategies to more effectively address stressors.  Status: CONTINUED 3/10/25    Intervention(s)  Psycho-education regarding mental health diagnoses and treatment options    Skills training  Explore skills useful to client in current situation  Skills include assertiveness, communication, conflict management, problem-solving, relaxation, etc.    Solution-Focused Therapy  Explore patterns in patient's relationships and discussed options for new behaviors  Explore patterns in patient's actions and choices and discussed options for new behaviors    Cognitive-behavioral Therapy  Discuss common cognitive distortions, identified them in patient's life  Explore ways to challenge, replace, and act against these cognitions    Acceptance and Commitment Therapy  Explore and identified important values in patient's life  Discuss ways to commit to behavioral activation around these values    Psychodynamic psychotherapy  Discuss patient's emotional dynamics and issues and how they impact behaviors  Explore patient's history of relationships and how they impact present behaviors  Explore how to work with and make changes in these schemas and patterns    Behavioral Activation  Discuss steps patient can take to become more involved in meaningful activity  Identify barriers to these activities and explored possible solutions    Mindfulness-Based Strategies  Discuss skills based on development and application of mindfulness  Skills drawn from  dialectical behavior therapy, mindfulness-based stress reduction, mindfulness-based cognitive therapy, etc.       Rocío Potts, [facility]  3/10/25

## 2025-06-10 ENCOUNTER — VIRTUAL VISIT (OUTPATIENT)
Dept: PSYCHOLOGY | Facility: CLINIC | Age: 40
End: 2025-06-10
Payer: COMMERCIAL

## 2025-06-10 DIAGNOSIS — F43.9 TRAUMA AND STRESSOR-RELATED DISORDER: ICD-10-CM

## 2025-06-10 DIAGNOSIS — F41.1 GENERALIZED ANXIETY DISORDER: Primary | ICD-10-CM

## 2025-06-10 PROCEDURE — 90834 PSYTX W PT 45 MINUTES: CPT | Mod: 95

## 2025-06-10 NOTE — PROGRESS NOTES
[facility]                                     Progress Note    Patient Name: Albertina Rojas  Date 6/10/25         Service Type: Individual      Session Start Time: 10:04A  Session End Time: 10:47A     Session Length: 43 minutes    Session #: 12    Attendees: Client attended alone    Service Modality:  Video Visit:      Provider verified identity through the following two step process.  Patient provided:  Patient is known previously to provider    Telemedicine Visit: The patient's condition can be safely assessed and treated via synchronous audio and visual telemedicine encounter.      Reason for Telemedicine Visit: Patient has requested telehealth visit    Originating Site (Patient Location): Patient's home    Distant Site (Provider Location): Provider Remote Setting- Home Office    Consent:  The patient/guardian has verbally consented to: the potential risks and benefits of telemedicine (video visit) versus in person care; bill my insurance or make self-payment for services provided; and responsibility for payment of non-covered services.     Patient would like the video invitation sent by:  My Chart    Mode of Communication:  Video Conference via [employer]    Distant Location (Provider):  Off-site    As the provider I attest to compliance with applicable laws and regulations related to telemedicine.    DATA  Interactive Complexity: No  Crisis: No  Extended Session (60+ minutes): No       Progress Since Last Session (Related to Symptoms / Goals / Homework):   Symptoms: No change maintains hyperverbalizations and hyperactivity; difficulties sleeping and compounded stressors, difficulty to focus and complete tasks    Homework: Completed in session      Episode of Care Goals: Satisfactory progress - ACTION (Actively working towards change); Intervened by reinforcing change plan / affirming steps taken     Current / Ongoing Stressors and Concerns:    Pt expresses satisfying adjustment to new baby  with schedule and needs, how her and partner are supporting baby and one another. Pt spent session reflecting on difficulties and increased strain with Mason, how Owen is adjusting to new baby. Further exploration of differing parenting styles between her and Shamir. Writer and pt worked on cognitive restructuring, reframing semantics/generalizations, negative thoughts through acceptance and acknowledging where the appreciation in the topic can be seen to help feel senses of control and gratitude. Writer and pt explored ways to improve milk supply, post partum support and additional resource information sent via [employer]. Pt discussed her newer fasciitis pain in other foot she had surgery on which exacerbates pain with holding baby (increased weight).     Treatment Objective(s) Addressed in This Session:   use cognitive strategies identified in therapy to challenge anxious thoughts  Increase interest, engagement, and pleasure in doing things  Decrease frequency and intensity of feeling down, depressed, hopeless  Improve quantity and quality of night time sleep / decrease daytime naps  Feel less tired and more energy during the day   Identify negative self-talk and behaviors: challenge core beliefs, myths, and actions  Improve concentration, focus, and mindfulness in daily activities   Feel less fidgety, restless or slow in daily activities / interpersonal interactions  Trauma sx mgmt     Intervention:   CBT: reframe negative thinking; challenge anxious thoughts  Interpersonal Therapy: healthy boundary setting, assertive communication    Motivational Interviewing    MI Intervention: Expressed Empathy/Understanding, Supported Autonomy, Collaboration, Evocation, Permission to raise concern or advise, Open-ended questions, and Reflections: simple and complex     Change Talk Expressed by the Patient: Desire to change Ability to change Reasons to change Need to change Committment to change    Provider Response to Change  Talk: E - Evoked more info from patient about behavior change, A - Affirmed patient's thoughts, decisions, or attempts at behavior change, R - Reflected patient's change talk, and S - Summarized patient's change talk statements    Assessments completed prior to visit:    PHQ2:       6/10/2025     7:37 AM 3/6/2025    10:38 AM 3/4/2025     4:57 PM 12/23/2024     8:03 AM 12/8/2024     1:23 PM 7/18/2024     2:33 PM 4/18/2024     2:25 PM   PHQ-2 ( 1999 [employer])   Q1: Little interest or pleasure in doing things 0 0 0 0 0 0 0   Q2: Feeling down, depressed or hopeless 0 0 0 1 0 0 0   PHQ-2 Score 0  0  0  1  0  0 0   Q1: Little interest or pleasure in doing things Not at all Not at all Not at all Not at all Not at all Not at all Not at all   Q2: Feeling down, depressed or hopeless Not at all Not at all Not at all Several days Not at all Not at all Not at all   PHQ-2 Score 0 0 0 1 0 0 0       Patient-reported      GAD2:       12/23/2024     8:04 AM 1/22/2025     9:16 AM 2/9/2025     9:38 AM 2/19/2025    11:11 AM 3/10/2025     8:08 AM 4/11/2025     6:22 PM 5/13/2025     8:56 AM   AMELIA-2   Feeling nervous, anxious, or on edge 2 3 1 1 1 1 1   Not being able to stop or control worrying 2 3 1 1 1 1 1   AMELIA-2 Total Score 4  6  2  2  2  2  2        Patient-reported      ASSESSMENT: Current Emotional / Mental Status (status of significant symptoms):   Risk status (Self / Other harm or suicidal ideation)  Patient denies current homicidal ideation and behaviors.  Patient denies current self-injurious ideation and behaviors.    Patient denied risk behaviors associated with substance use.  Patient denies any high risk behaviors associated with mental health symptoms.  Patient reports the following current concerns for their personal safety: None.  Patient reports there are firearms in the house.     yes, they are secured. The firearms are secured in a locked space.   Recommended that patient call 911 or go to the local ED should there be a  change in any of these risk factors    Current Mental Status Exam:   Appearance:                Appropriate    Eye Contact:               Good   Psychomotor:              Normal       Gait / station:           seated  Attitude / Demeanor:   Cooperative  Pleasant  Speech      Rate / Production:   Normal/ Responsive      Volume:                   Normal  volume      Language:               intact  Mood:                          Anxious   Affect:                          Tearful   Thought Content:        Clear   Thought Process:        Coherent       Associations:           No loosening of associations  Insight:                         Fair   Judgment:                   Intact   Orientation:                 All  Attention/concentration:          Fair     Medication Review:   No changes to current psychiatric medication(s)    Current Outpatient Medications   Medication Sig Dispense Refill    aspirin (ASA) 81 MG chewable tablet Take 81 mg by mouth daily.      calcium carbonate 750 MG CHEW Take 750 mg by mouth daily.      Cyanocobalamin (VITAMIN B-12) 5000 MCG SUBL       lactobacillus rhamnosus, GG, (CULTURELL) capsule Take 1 capsule by mouth 2 times daily      Omeprazole Magnesium (PRILOSEC PO) 20 mg every morning.      Prenatal Vit-Fe Fumarate-FA (PRENATAL MULTIVITAMIN  PLUS IRON) 27-1 MG TABS Take 1 tablet by mouth daily       No current facility-administered medications for this visit.         Medication Compliance:   NA     Changes in Health Issues:   None reported     Chemical Use Review:   Substance Use: Chemical use reviewed, no active concerns identified      Tobacco Use: No current tobacco use.      Diagnosis:  1. Generalized anxiety disorder    2. Trauma and stressor-related disorder      Collateral Reports Completed:   Not Applicable    PLAN: (Patient Tasks / Therapist Tasks / Other)    Pt was encouraged to CONTINUE utilizing healthy boundary setting and assertive communication, considerations for decreasing  compounded stressors that is within her control. Writer also encouraged pt to practice self-care, self-josé and self-acceptance, leaning on chosen healthy supports in her life and remind self what she has control over, accept what can't be controlled. Writer encourged pt to continue looking into resources and strategies to improve co-parenting relationship structure of responsibilities with Owen's care and advocating for herself and accommodation at work where necessary to improve quality of life and co-parenting. Writer sent pt post partum support and additional resource information sent via [email or web address].    Rocío Potts, [facility]  6/10/25                                                      ______________________________________________________________________    Individual Treatment Plan    Patient's Name: Albertina Rojas  YOB: 1985    Date of Creation: 12/9/24  Date Treatment Plan Last Reviewed/Revised: 6/10/25    DSM5 Diagnoses:   300.02 (F41.1) Generalized Anxiety Disorder with depressive sx and panic attacks  309.9 (F43.9) Unspecified Trauma and Stressor Related Disorder     Psychosocial / Contextual Factors:   Trauma history, relational stressors    PROMIS (reviewed every 90 days): 6/10/25    PROMIS 10-Global Health (only subscores and total score):       3/21/2024     9:23 AM 7/13/2024     9:40 PM 11/3/2024     5:27 PM 2/9/2025     9:39 AM 2/19/2025    11:12 AM 3/10/2025     8:09 AM 6/10/2025     7:38 AM   PROMIS-10 Scores Only   Global Mental Health Score 12 13 12  11  14  12  14    Global Physical Health Score 16 14 16  16  13  15  11    PROMIS TOTAL - SUBSCORES 28 27 28  27  27  27  25        Patient-reported        Referral / Collaboration:  Referral to another professional/service is not indicated at this time..    Anticipated number of session for this episode of care: 6-9 sessions  Anticipation frequency of session: Every other week  Anticipated Duration of each session: 38-52  minutes  Treatment plan will be reviewed in 90 days or when goals have been changed.       MeasurableTreatment Goal(s) related to diagnosis / functional impairment(s)    Goal:  Patient will reduce symptoms and impacts of anxiety - generalized anxiety; effectively reduce anxiety symptoms as evidenced by a reduced GAD7 score of 5 or less with the occurrence of several days or less.    Objective #A:  will experience a reduction in anxiety, will develop  more effective coping skills to manage anxiety symptoms, will develop healthy cognitive patterns and beliefs and will increase ability to function adaptively              Client will use cognitive strategies identified in therapy to challenge anxious thoughts.  Status: CONTINUED 6/10/25    Objective #B:  will experience a reduction in anxiety, will develop more effective coping skills to manage anxiety symptoms, will develop healthy cognitive patterns and beliefs and will increase ability to function adaptively  Client will use relaxation strategies many times per day to reduce the physical symptoms of anxiety.  Status: CONTINUED 6/10/25    Goal: Patient will experience a reduction in trauma sx; pt will identify and utilize healthy strategies to better manage trauma sx.    Objective #A (Patient Action)    Patient will identify 3 healthy coping strategies to manage panic sx.  Status: CONTINUED 6/10/25    Objective #B  Patient will identify 3 sleep hygiene practices.  Status: CONTINUED 6/10/25    Objective #C  Patient will identify 3 strategies to more effectively address stressors.  Status: CONTINUED 6/10/25    Intervention(s)  Psycho-education regarding mental health diagnoses and treatment options    Skills training  Explore skills useful to client in current situation  Skills include assertiveness, communication, conflict management, problem-solving, relaxation, etc.    Solution-Focused Therapy  Explore patterns in patient's relationships and discussed options for new  behaviors  Explore patterns in patient's actions and choices and discussed options for new behaviors    Cognitive-behavioral Therapy  Discuss common cognitive distortions, identified them in patient's life  Explore ways to challenge, replace, and act against these cognitions    Acceptance and Commitment Therapy  Explore and identified important values in patient's life  Discuss ways to commit to behavioral activation around these values    Psychodynamic psychotherapy  Discuss patient's emotional dynamics and issues and how they impact behaviors  Explore patient's history of relationships and how they impact present behaviors  Explore how to work with and make changes in these schemas and patterns    Behavioral Activation  Discuss steps patient can take to become more involved in meaningful activity  Identify barriers to these activities and explored possible solutions    Mindfulness-Based Strategies  Discuss skills based on development and application of mindfulness  Skills drawn from dialectical behavior therapy, mindfulness-based stress reduction, mindfulness-based cognitive therapy, etc.       Rocío Potts, [facility]  6/10/25

## 2025-06-16 NOTE — PATIENT INSTRUCTIONS
If you have any questions regarding your visit, Please contact your care team.    [email or web address]: [phone]      Women s Health CLINIC HOURS TELEPHONE NUMBER   Jinny Zhou DO.    CHANTELLE Chanel - Surgery Scheduler  Patti - Surgery Scheduler    ELÍAS Barber RN Kylie, RN     Monday, Thursday  [place]  7am-3pm    Tuesday, Wednesday  [place]  7am-3pm    Friday  [place]  1pm-3:30pm    Typical Surgery Days: Thursday or Friday   [facility]  [address] [place] [address] [phone] Phone  [phone] Fax    [address] [address] [phone] Phone    Imaging [phone] Phone    [facility] Labor and Delivery:  [phone] Phone     **Surgeries** Our Surgery Schedulers will contact you to schedule. If you do not receive a call within 3 business days, please call [phone].    Urgent Care locations:  [facility] Saturday and    9 am - 5 pm    Monday-Friday   12 pm - 8 pm  Saturday and    9 am - 5 pm   [phone]       If you need a medication refill, please contact your pharmacy. Please allow 3 business days for your refill to be completed.  As always, Thank you for trusting us with your healthcare needs!

## 2025-06-17 ASSESSMENT — PATIENT HEALTH QUESTIONNAIRE - PHQ9
SUM OF ALL RESPONSES TO PHQ QUESTIONS 1-9: 0
10. IF YOU CHECKED OFF ANY PROBLEMS, HOW DIFFICULT HAVE THESE PROBLEMS MADE IT FOR YOU TO DO YOUR WORK, TAKE CARE OF THINGS AT HOME, OR GET ALONG WITH OTHER PEOPLE: NOT DIFFICULT AT ALL
SUM OF ALL RESPONSES TO PHQ QUESTIONS 1-9: 0

## 2025-06-18 ENCOUNTER — PRENATAL OFFICE VISIT (OUTPATIENT)
Dept: OBGYN | Facility: CLINIC | Age: 40
End: 2025-06-18
Payer: COMMERCIAL

## 2025-06-18 VITALS
WEIGHT: 229.2 LBS | DIASTOLIC BLOOD PRESSURE: 77 MMHG | HEART RATE: 71 BPM | BODY MASS INDEX: 37.56 KG/M2 | SYSTOLIC BLOOD PRESSURE: 114 MMHG

## 2025-06-18 PROCEDURE — 0503F POSTPARTUM CARE VISIT: CPT | Performed by: OBSTETRICS & GYNECOLOGY

## 2025-06-18 PROCEDURE — 3078F DIAST BP <80 MM HG: CPT | Performed by: OBSTETRICS & GYNECOLOGY

## 2025-06-18 PROCEDURE — 99207 PR POST PARTUM EXAM: CPT | Performed by: OBSTETRICS & GYNECOLOGY

## 2025-06-18 PROCEDURE — 3074F SYST BP LT 130 MM HG: CPT | Performed by: OBSTETRICS & GYNECOLOGY

## 2025-06-18 RX ORDER — CHOLECALCIFEROL (VITAMIN D3) 50 MCG
TABLET ORAL
COMMUNITY
Start: 2025-05-07

## 2025-06-18 NOTE — PROGRESS NOTES
Postpartum Visit    Albertina Rojas is 39 year old  who is s/p /PPTL on - with delivery of a viable female infant, here for routine postpartum care.   Pregnancy c/b AMA, RNI, Desired TL.   Delivery and postpartum course c/b - None    Since delivery, she has been doing well. She has been pumping.  She has not had a normal menses.  She has not had intercourse.  Patient screened for postpartum depression and complaints are NEGATIVE. Screening has also been completed for intimate partner violence.     Today, she would like to discuss - none.        PE: /77   Pulse 71   Wt 104 kg (229 lb 3.2 oz)   Breastfeeding Yes   BMI 37.56 kg/m    Body mass index is 37.56 kg/m .      Gen: Alert, oriented, appropriately interactive, NAD  Resp: no audible wheeze, cough, or visible cyanosis.  No visible retractions or increased work of breathing.  Able to speak fully in complete sentences.  Abdomen: soft, non tender, non distended, no masses. Infraumbilical incision c/d/i  Neuro: Cranial nerves grossly intact, mentation intact and speech normal  Psych: mentation appears normal, affect normal/bright, judgement and insight intact, normal speech and appearance well-groomed    inal Diagnosis    A.Left fallopian tube, salpingectomy: Fimbriated fallopian tube without atypia or malignancy.  B.  Right fallopian tube, salpingectomy: Fimbriated fallopian tube with paratubal cyst.  Negative for atypia or malignancy.   Electronically signed by Jaron Meier Jr., MD on 2025 at 12:51 PM       A/P: Albertina Rojas is 39 year old  who is s/p /PPTL on - with delivery of a viable female infant, here for routine postpartum care.   - Routine postpartum care, doing well. Pumping. PHQ 0.   - Baby has seen peds, doing well. No concerns  - Pap was not performed  - Contraception: TL    Return to Primary care provider for routine care, gyn as needed or for WWE      Jinny Zhou,

## 2025-06-30 ENCOUNTER — MEDICAL CORRESPONDENCE (OUTPATIENT)
Dept: HEALTH INFORMATION MANAGEMENT | Facility: CLINIC | Age: 40
End: 2025-06-30
Payer: COMMERCIAL

## 2025-07-08 ENCOUNTER — VIRTUAL VISIT (OUTPATIENT)
Dept: PSYCHOLOGY | Facility: CLINIC | Age: 40
End: 2025-07-08
Payer: COMMERCIAL

## 2025-07-08 DIAGNOSIS — F41.1 GAD (GENERALIZED ANXIETY DISORDER): ICD-10-CM

## 2025-07-08 DIAGNOSIS — F43.9 TRAUMA AND STRESSOR-RELATED DISORDER: ICD-10-CM

## 2025-07-08 DIAGNOSIS — F41.1 GENERALIZED ANXIETY DISORDER: Primary | ICD-10-CM

## 2025-07-08 PROCEDURE — 90834 PSYTX W PT 45 MINUTES: CPT | Mod: 95

## 2025-07-08 NOTE — PROGRESS NOTES
[facility]                                     Progress Note    Patient Name: Albertina Rojas  Date 7/8/25         Service Type: Individual      Session Start Time: 10:02A  Session End Time: 10:53A     Session Length: 51 minutes    Session #: 13    Attendees: Client attended alone    Service Modality:  Video Visit:      Provider verified identity through the following two step process.  Patient provided:  Patient is known previously to provider    Telemedicine Visit: The patient's condition can be safely assessed and treated via synchronous audio and visual telemedicine encounter.      Reason for Telemedicine Visit: Patient has requested telehealth visit    Originating Site (Patient Location): Patient's home    Distant Site (Provider Location): Provider Remote Setting- Home Office    Consent:  The patient/guardian has verbally consented to: the potential risks and benefits of telemedicine (video visit) versus in person care; bill my insurance or make self-payment for services provided; and responsibility for payment of non-covered services.     Patient would like the video invitation sent by:  My Chart    Mode of Communication:  Video Conference via [employer]    Distant Location (Provider):  Off-site    As the provider I attest to compliance with applicable laws and regulations related to telemedicine.    DATA  Interactive Complexity: No  Crisis: No  Extended Session (60+ minutes): No    DA: 11/8/24     Progress Since Last Session (Related to Symptoms / Goals / Homework):   Symptoms: No change maintains hyperverbalizations and hyperactivity; difficulties sleeping and compounded stressors, difficulty to focus and complete tasks    Homework: Completed in session      Episode of Care Goals: Satisfactory progress - ACTION (Actively working towards change); Intervened by reinforcing change plan / affirming steps taken     Current / Ongoing Stressors and Concerns:    Pt with baby in session. Pt processed  updates with managing  and partner dynamics, Owen's activities this summer, navigating co-parenting with .  Writer and pt worked on cognitive restructuring, reframing semantics/generalizations, negative thoughts through acceptance and acknowledging where the appreciation in the topic can be seen to help feel senses of control and gratitude. Pt still has a couple months left of maternity leave, no stressors regarding this.      Treatment Objective(s) Addressed in This Session:   use cognitive strategies identified in therapy to challenge anxious thoughts  Increase interest, engagement, and pleasure in doing things  Decrease frequency and intensity of feeling down, depressed, hopeless  Improve quantity and quality of night time sleep / decrease daytime naps  Feel less tired and more energy during the day   Identify negative self-talk and behaviors: challenge core beliefs, myths, and actions  Improve concentration, focus, and mindfulness in daily activities   Feel less fidgety, restless or slow in daily activities / interpersonal interactions  Trauma sx mgmt     Intervention:   CBT: reframe negative thinking; challenge anxious thoughts  Interpersonal Therapy: healthy boundary setting, assertive communication    Motivational Interviewing    MI Intervention: Expressed Empathy/Understanding, Supported Autonomy, Collaboration, Evocation, Permission to raise concern or advise, Open-ended questions, and Reflections: simple and complex     Change Talk Expressed by the Patient: Desire to change Ability to change Reasons to change Need to change Committment to change    Provider Response to Change Talk: E - Evoked more info from patient about behavior change, A - Affirmed patient's thoughts, decisions, or attempts at behavior change, R - Reflected patient's change talk, and S - Summarized patient's change talk statements    Assessments completed prior to visit:    PHQ2:       2025     9:52 AM 6/10/2025      7:37 AM 3/6/2025    10:38 AM 3/4/2025     4:57 PM 12/23/2024     8:03 AM 12/8/2024     1:23 PM 7/18/2024     2:33 PM   PHQ-2 ( 1999 [employer])   Q1: Little interest or pleasure in doing things 0 0 0 0 0 0 0   Q2: Feeling down, depressed or hopeless 0 0 0 0 1 0 0   PHQ-2 Score 0  0  0  0  1  0  0   Q1: Little interest or pleasure in doing things Not at all Not at all Not at all Not at all Not at all Not at all Not at all   Q2: Feeling down, depressed or hopeless Not at all Not at all Not at all Not at all Several days Not at all Not at all   PHQ-2 Score 0 0 0 0 1 0 0       Patient-reported      GAD2:       1/22/2025     9:16 AM 2/9/2025     9:38 AM 2/19/2025    11:11 AM 3/10/2025     8:08 AM 4/11/2025     6:22 PM 5/13/2025     8:56 AM 7/8/2025     9:52 AM   AMELIA-2   Feeling nervous, anxious, or on edge 3 1 1 1 1 1 1   Not being able to stop or control worrying 3 1 1 1 1 1 1   AMELIA-2 Total Score 6  2  2  2  2  2  2        Patient-reported      ASSESSMENT: Current Emotional / Mental Status (status of significant symptoms):   Risk status (Self / Other harm or suicidal ideation)  Patient denies current homicidal ideation and behaviors.  Patient denies current self-injurious ideation and behaviors.    Patient denied risk behaviors associated with substance use.  Patient denies any high risk behaviors associated with mental health symptoms.  Patient reports the following current concerns for their personal safety: None.  Patient reports there are firearms in the house.     yes, they are secured. The firearms are secured in a locked space.   Recommended that patient call 911 or go to the local ED should there be a change in any of these risk factors    Current Mental Status Exam:   Appearance:                Appropriate    Eye Contact:               Good   Psychomotor:              Normal       Gait / station:           seated  Attitude / Demeanor:   Cooperative  Pleasant  Speech      Rate / Production:   Normal/ Responsive       Volume:                   Normal  volume      Language:               intact  Mood:                          Anxious   Affect:                          Tearful   Thought Content:        Clear   Thought Process:        Coherent       Associations:           No loosening of associations  Insight:                         Fair   Judgment:                   Intact   Orientation:                 All  Attention/concentration:          Fair     Medication Review:   No changes to current psychiatric medication(s)    Current Outpatient Medications   Medication Sig Dispense Refill    calcium carbonate 750 MG CHEW Take 750 mg by mouth daily.      lactobacillus rhamnosus, GG, (CULTURELL) capsule Take 1 capsule by mouth 2 times daily      Prenatal Vit-Fe Fumarate-FA (PRENATAL MULTIVITAMIN  PLUS IRON) 27-1 MG TABS Take 1 tablet by mouth daily      vitamin D3 (CHOLECALCIFEROL) 50 mcg (2000 units) tablet        No current facility-administered medications for this visit.         Medication Compliance:   NA     Changes in Health Issues:   None reported     Chemical Use Review:   Substance Use: Chemical use reviewed, no active concerns identified      Tobacco Use: No current tobacco use.      Diagnosis:  1. Generalized anxiety disorder    2. Trauma and stressor-related disorder    3. AMELIA (generalized anxiety disorder)      Collateral Reports Completed:   Not Applicable    PLAN: (Patient Tasks / Therapist Tasks / Other)    Pt was encouraged to CONTINUE utilizing healthy boundary setting and assertive communication, considerations for decreasing compounded stressors that is within her control. Writer also encouraged pt to practice self-care, self-josé and self-acceptance, leaning on chosen healthy supports in her life and remind self what she has control over, accept what can't be controlled. Writer encourged pt to continue looking into resources and strategies to improve co-parenting relationship structure of responsibilities with  Owen's care and advocating for herself and accommodation at work where necessary to improve quality of life and co-parenting. Writer sent pt post partum support and additional resource information sent via [employer].    Rocío Potts, LICSW  7/8/25                                                      ______________________________________________________________________    Individual Treatment Plan    Patient's Name: Albertina Rojas  YOB: 1985    Date of Creation: 12/9/24  Date Treatment Plan Last Reviewed/Revised: 6/10/25    DSM5 Diagnoses:   300.02 (F41.1) Generalized Anxiety Disorder with depressive sx and panic attacks  309.9 (F43.9) Unspecified Trauma and Stressor Related Disorder     Psychosocial / Contextual Factors:   Trauma history, relational stressors    PROMIS (reviewed every 90 days): 7/8/25    PROMIS 10-Global Health (only subscores and total score):       7/13/2024     9:40 PM 11/3/2024     5:27 PM 2/9/2025     9:39 AM 2/19/2025    11:12 AM 3/10/2025     8:09 AM 6/10/2025     7:38 AM 7/8/2025     9:53 AM   PROMIS-10 Scores Only   Global Mental Health Score 13 12  11  14  12  14  14    Global Physical Health Score 14 16  16  13  15  11  11    PROMIS TOTAL - SUBSCORES 27 28  27  27  27  25  25        Patient-reported        Referral / Collaboration:  Referral to another professional/service is not indicated at this time..    Anticipated number of session for this episode of care: 6-9 sessions  Anticipation frequency of session: Every other week  Anticipated Duration of each session: 38-52 minutes  Treatment plan will be reviewed in 90 days or when goals have been changed.       MeasurableTreatment Goal(s) related to diagnosis / functional impairment(s)    Goal:  Patient will reduce symptoms and impacts of anxiety - generalized anxiety; effectively reduce anxiety symptoms as evidenced by a reduced GAD7 score of 5 or less with the occurrence of several days or less.    Objective #A:   will experience a reduction in anxiety, will develop  more effective coping skills to manage anxiety symptoms, will develop healthy cognitive patterns and beliefs and will increase ability to function adaptively              Client will use cognitive strategies identified in therapy to challenge anxious thoughts.  Status: CONTINUED 6/10/25    Objective #B:  will experience a reduction in anxiety, will develop more effective coping skills to manage anxiety symptoms, will develop healthy cognitive patterns and beliefs and will increase ability to function adaptively  Client will use relaxation strategies many times per day to reduce the physical symptoms of anxiety.  Status: CONTINUED 6/10/25    Goal: Patient will experience a reduction in trauma sx; pt will identify and utilize healthy strategies to better manage trauma sx.    Objective #A (Patient Action)    Patient will identify 3 healthy coping strategies to manage panic sx.  Status: CONTINUED 6/10/25    Objective #B  Patient will identify 3 sleep hygiene practices.  Status: CONTINUED 6/10/25    Objective #C  Patient will identify 3 strategies to more effectively address stressors.  Status: CONTINUED 6/10/25    Intervention(s)  Psycho-education regarding mental health diagnoses and treatment options    Skills training  Explore skills useful to client in current situation  Skills include assertiveness, communication, conflict management, problem-solving, relaxation, etc.    Solution-Focused Therapy  Explore patterns in patient's relationships and discussed options for new behaviors  Explore patterns in patient's actions and choices and discussed options for new behaviors    Cognitive-behavioral Therapy  Discuss common cognitive distortions, identified them in patient's life  Explore ways to challenge, replace, and act against these cognitions    Acceptance and Commitment Therapy  Explore and identified important values in patient's life  Discuss ways to commit to  behavioral activation around these values    Psychodynamic psychotherapy  Discuss patient's emotional dynamics and issues and how they impact behaviors  Explore patient's history of relationships and how they impact present behaviors  Explore how to work with and make changes in these schemas and patterns    Behavioral Activation  Discuss steps patient can take to become more involved in meaningful activity  Identify barriers to these activities and explored possible solutions    Mindfulness-Based Strategies  Discuss skills based on development and application of mindfulness  Skills drawn from dialectical behavior therapy, mindfulness-based stress reduction, mindfulness-based cognitive therapy, etc.       Rocío Potts, Health system  6/10/25

## 2025-07-22 ENCOUNTER — VIRTUAL VISIT (OUTPATIENT)
Dept: PSYCHOLOGY | Facility: CLINIC | Age: 40
End: 2025-07-22
Payer: COMMERCIAL

## 2025-07-22 DIAGNOSIS — F43.9 TRAUMA AND STRESSOR-RELATED DISORDER: ICD-10-CM

## 2025-07-22 DIAGNOSIS — F41.1 GENERALIZED ANXIETY DISORDER: Primary | ICD-10-CM

## 2025-07-22 PROCEDURE — 90834 PSYTX W PT 45 MINUTES: CPT | Mod: 95

## 2025-07-22 NOTE — PROGRESS NOTES
[facility]                                     Progress Note    Patient Name: Albertina Rojas  Date 7/22/25         Service Type: Individual      Session Start Time: 1:02P Session End Time: 1:40P     Session Length: 38 minutes    Session #: 14    Attendees: Client attended alone    Service Modality:  Video Visit:      Provider verified identity through the following two step process.  Patient provided:  Patient is known previously to provider    Telemedicine Visit: The patient's condition can be safely assessed and treated via synchronous audio and visual telemedicine encounter.      Reason for Telemedicine Visit: Patient has requested telehealth visit    Originating Site (Patient Location): Patient's home    Distant Site (Provider Location): Provider Remote Setting- Home Office    Consent:  The patient/guardian has verbally consented to: the potential risks and benefits of telemedicine (video visit) versus in person care; bill my insurance or make self-payment for services provided; and responsibility for payment of non-covered services.     Patient would like the video invitation sent by:  My Chart    Mode of Communication:  Video Conference via [employer]    Distant Location (Provider):  Off-site    As the provider I attest to compliance with applicable laws and regulations related to telemedicine.    DATA  Interactive Complexity: No  Crisis: No  Extended Session (60+ minutes): No    DA: 11/8/24     Progress Since Last Session (Related to Symptoms / Goals / Homework):   Symptoms: No change maintains hyperverbalizations and hyperactivity; difficulties sleeping and compounded stressors, difficulty to focus and complete tasks    Homework: Completed in session      Episode of Care Goals: Satisfactory progress - ACTION (Actively working towards change); Intervened by reinforcing change plan / affirming steps taken     Current / Ongoing Stressors and Concerns:    Pt went camping with baby and Owen  "and \"went well.\" Pt has been going to sports for the kids, [place], staying busy and distracted. Pt will have a couple free days and describes how she will be doing some intentional self care. Pt reports sleeping \"is decent\" even with the baby. Pt reflected on dynamics with partner and caring for baby. Pt continues to work on navigating co-parenting with .  Writer and pt worked on cognitive restructuring, reframing semantics/generalizations, negative thoughts through acceptance and acknowledging where the appreciation in the topic can be seen to help feel senses of control and gratitude. Pt still has a couple months left of maternity leave, no stressors regarding this.      Treatment Objective(s) Addressed in This Session:   use cognitive strategies identified in therapy to challenge anxious thoughts  Increase interest, engagement, and pleasure in doing things  Decrease frequency and intensity of feeling down, depressed, hopeless  Improve quantity and quality of night time sleep / decrease daytime naps  Feel less tired and more energy during the day   Identify negative self-talk and behaviors: challenge core beliefs, myths, and actions  Improve concentration, focus, and mindfulness in daily activities   Feel less fidgety, restless or slow in daily activities / interpersonal interactions  Trauma sx mgmt     Intervention:   CBT: reframe negative thinking; challenge anxious thoughts  Interpersonal Therapy: healthy boundary setting, assertive communication    Motivational Interviewing    MI Intervention: Expressed Empathy/Understanding, Supported Autonomy, Collaboration, Evocation, Permission to raise concern or advise, Open-ended questions, and Reflections: simple and complex     Change Talk Expressed by the Patient: Desire to change Ability to change Reasons to change Need to change Committment to change    Provider Response to Change Talk: E - Evoked more info from patient about behavior change, A - " Affirmed patient's thoughts, decisions, or attempts at behavior change, R - Reflected patient's change talk, and S - Summarized patient's change talk statements    Assessments completed prior to visit:    PHQ2:       7/8/2025     9:52 AM 6/10/2025     7:37 AM 3/6/2025    10:38 AM 3/4/2025     4:57 PM 12/23/2024     8:03 AM 12/8/2024     1:23 PM 7/18/2024     2:33 PM   PHQ-2 ( 1999 [employer])   Q1: Little interest or pleasure in doing things 0 0 0 0 0 0 0   Q2: Feeling down, depressed or hopeless 0 0 0 0 1 0 0   PHQ-2 Score 0  0  0  0  1  0  0   Q1: Little interest or pleasure in doing things Not at all Not at all Not at all Not at all Not at all Not at all Not at all   Q2: Feeling down, depressed or hopeless Not at all Not at all Not at all Not at all Several days Not at all Not at all   PHQ-2 Score 0 0 0 0 1 0 0       Patient-reported      GAD2:       2/9/2025     9:38 AM 2/19/2025    11:11 AM 3/10/2025     8:08 AM 4/11/2025     6:22 PM 5/13/2025     8:56 AM 7/8/2025     9:52 AM 7/22/2025    12:39 PM   AMELIA-2   Feeling nervous, anxious, or on edge 1 1 1 1 1 1 1   Not being able to stop or control worrying 1 1 1 1 1 1 1   AMELIA-2 Total Score 2  2  2  2  2  2  2        Patient-reported      ASSESSMENT: Current Emotional / Mental Status (status of significant symptoms):   Risk status (Self / Other harm or suicidal ideation)  Patient denies current homicidal ideation and behaviors.  Patient denies current self-injurious ideation and behaviors.    Patient denied risk behaviors associated with substance use.  Patient denies any high risk behaviors associated with mental health symptoms.  Patient reports the following current concerns for their personal safety: None.  Patient reports there are firearms in the house.     yes, they are secured. The firearms are secured in a locked space.   Recommended that patient call 911 or go to the local ED should there be a change in any of these risk factors    Current Mental Status Exam:    Appearance:                Appropriate    Eye Contact:               Good   Psychomotor:              Normal       Gait / station:           seated  Attitude / Demeanor:   Cooperative  Pleasant  Speech      Rate / Production:   Normal/ Responsive      Volume:                   Normal  volume      Language:               intact  Mood:                          Anxious   Affect:                          Tearful   Thought Content:        Clear   Thought Process:        Coherent       Associations:           No loosening of associations  Insight:                         Fair   Judgment:                   Intact   Orientation:                 All  Attention/concentration:          Fair     Medication Review:   No changes to current psychiatric medication(s)    Current Outpatient Medications   Medication Sig Dispense Refill    calcium carbonate 750 MG CHEW Take 750 mg by mouth daily.      lactobacillus rhamnosus, GG, (CULTURELL) capsule Take 1 capsule by mouth 2 times daily      Prenatal Vit-Fe Fumarate-FA (PRENATAL MULTIVITAMIN  PLUS IRON) 27-1 MG TABS Take 1 tablet by mouth daily      vitamin D3 (CHOLECALCIFEROL) 50 mcg (2000 units) tablet        No current facility-administered medications for this visit.         Medication Compliance:   NA     Changes in Health Issues:   None reported     Chemical Use Review:   Substance Use: Chemical use reviewed, no active concerns identified      Tobacco Use: No current tobacco use.      Diagnosis:  1. Generalized anxiety disorder    2. Trauma and stressor-related disorder      Collateral Reports Completed:   Not Applicable    PLAN: (Patient Tasks / Therapist Tasks / Other)    Pt was encouraged to CONTINUE utilizing healthy boundary setting and assertive communication, considerations for decreasing compounded stressors that is within her control. Writer also encouraged pt to practice self-care, self-josé and self-acceptance, leaning on chosen healthy supports in her life and  remind self what she has control over, accept what can't be controlled. Writer encourged pt to continue looking into resources and strategies to improve co-parenting relationship structure of responsibilities with Owen's care and advocating for herself and accommodation at work where necessary to improve quality of life and co-parenting. Writer sent pt post partum support and additional resource information sent via [employer].    Rocío Potts, [facility]  7/22/25                                                      ______________________________________________________________________    Individual Treatment Plan    Patient's Name: Albertina Rojas  YOB: 1985    Date of Creation: 12/9/24  Date Treatment Plan Last Reviewed/Revised: 6/10/25    DSM5 Diagnoses:   300.02 (F41.1) Generalized Anxiety Disorder with depressive sx and panic attacks  309.9 (F43.9) Unspecified Trauma and Stressor Related Disorder     Psychosocial / Contextual Factors:   Trauma history, relational stressors    PROMIS (reviewed every 90 days): 7/8/25    PROMIS 10-Global Health (only subscores and total score):       7/13/2024     9:40 PM 11/3/2024     5:27 PM 2/9/2025     9:39 AM 2/19/2025    11:12 AM 3/10/2025     8:09 AM 6/10/2025     7:38 AM 7/8/2025     9:53 AM   PROMIS-10 Scores Only   Global Mental Health Score 13 12  11  14  12  14  14    Global Physical Health Score 14 16  16  13  15  11  11    PROMIS TOTAL - SUBSCORES 27 28  27  27  27  25  25        Patient-reported        Referral / Collaboration:  Referral to another professional/service is not indicated at this time..    Anticipated number of session for this episode of care: 6-9 sessions  Anticipation frequency of session: Every other week  Anticipated Duration of each session: 38-52 minutes  Treatment plan will be reviewed in 90 days or when goals have been changed.       MeasurableTreatment Goal(s) related to diagnosis / functional impairment(s)    Goal:  Patient  will reduce symptoms and impacts of anxiety - generalized anxiety; effectively reduce anxiety symptoms as evidenced by a reduced GAD7 score of 5 or less with the occurrence of several days or less.    Objective #A:  will experience a reduction in anxiety, will develop  more effective coping skills to manage anxiety symptoms, will develop healthy cognitive patterns and beliefs and will increase ability to function adaptively              Client will use cognitive strategies identified in therapy to challenge anxious thoughts.  Status: CONTINUED 6/10/25    Objective #B:  will experience a reduction in anxiety, will develop more effective coping skills to manage anxiety symptoms, will develop healthy cognitive patterns and beliefs and will increase ability to function adaptively  Client will use relaxation strategies many times per day to reduce the physical symptoms of anxiety.  Status: CONTINUED 6/10/25    Goal: Patient will experience a reduction in trauma sx; pt will identify and utilize healthy strategies to better manage trauma sx.    Objective #A (Patient Action)    Patient will identify 3 healthy coping strategies to manage panic sx.  Status: CONTINUED 6/10/25    Objective #B  Patient will identify 3 sleep hygiene practices.  Status: CONTINUED 6/10/25    Objective #C  Patient will identify 3 strategies to more effectively address stressors.  Status: CONTINUED 6/10/25    Intervention(s)  Psycho-education regarding mental health diagnoses and treatment options    Skills training  Explore skills useful to client in current situation  Skills include assertiveness, communication, conflict management, problem-solving, relaxation, etc.    Solution-Focused Therapy  Explore patterns in patient's relationships and discussed options for new behaviors  Explore patterns in patient's actions and choices and discussed options for new behaviors    Cognitive-behavioral Therapy  Discuss common cognitive distortions, identified  them in patient's life  Explore ways to challenge, replace, and act against these cognitions    Acceptance and Commitment Therapy  Explore and identified important values in patient's life  Discuss ways to commit to behavioral activation around these values    Psychodynamic psychotherapy  Discuss patient's emotional dynamics and issues and how they impact behaviors  Explore patient's history of relationships and how they impact present behaviors  Explore how to work with and make changes in these schemas and patterns    Behavioral Activation  Discuss steps patient can take to become more involved in meaningful activity  Identify barriers to these activities and explored possible solutions    Mindfulness-Based Strategies  Discuss skills based on development and application of mindfulness  Skills drawn from dialectical behavior therapy, mindfulness-based stress reduction, mindfulness-based cognitive therapy, etc.       Rocío Potts, [facility]  6/10/25

## 2025-08-05 ENCOUNTER — PATIENT OUTREACH (OUTPATIENT)
Dept: CARE COORDINATION | Facility: CLINIC | Age: 40
End: 2025-08-05
Payer: COMMERCIAL

## 2025-08-10 ENCOUNTER — HEALTH MAINTENANCE LETTER (OUTPATIENT)
Age: 40
End: 2025-08-10

## 2025-08-26 ENCOUNTER — VIRTUAL VISIT (OUTPATIENT)
Dept: PSYCHOLOGY | Facility: CLINIC | Age: 40
End: 2025-08-26
Payer: COMMERCIAL

## 2025-08-26 DIAGNOSIS — F41.1 GENERALIZED ANXIETY DISORDER: Primary | ICD-10-CM

## 2025-08-26 DIAGNOSIS — F43.9 TRAUMA AND STRESSOR-RELATED DISORDER: ICD-10-CM

## 2025-08-26 PROCEDURE — 90834 PSYTX W PT 45 MINUTES: CPT | Mod: 95
